# Patient Record
Sex: MALE | Race: WHITE | NOT HISPANIC OR LATINO | URBAN - METROPOLITAN AREA
[De-identification: names, ages, dates, MRNs, and addresses within clinical notes are randomized per-mention and may not be internally consistent; named-entity substitution may affect disease eponyms.]

---

## 2017-04-03 ENCOUNTER — OUTPATIENT (OUTPATIENT)
Dept: OUTPATIENT SERVICES | Facility: HOSPITAL | Age: 67
LOS: 1 days | End: 2017-04-03

## 2017-05-25 ENCOUNTER — APPOINTMENT (OUTPATIENT)
Dept: SURGERY | Facility: CLINIC | Age: 67
End: 2017-05-25

## 2017-05-25 VITALS — HEIGHT: 73 IN | WEIGHT: 236 LBS | BODY MASS INDEX: 31.28 KG/M2

## 2017-06-27 DIAGNOSIS — R13.14 DYSPHAGIA, PHARYNGOESOPHAGEAL PHASE: ICD-10-CM

## 2017-06-28 ENCOUNTER — OUTPATIENT (OUTPATIENT)
Dept: OUTPATIENT SERVICES | Facility: HOSPITAL | Age: 67
LOS: 1 days | Discharge: HOME | End: 2017-06-28

## 2017-06-28 ENCOUNTER — APPOINTMENT (OUTPATIENT)
Dept: SURGERY | Facility: AMBULATORY SURGERY CENTER | Age: 67
End: 2017-06-28

## 2017-06-30 DIAGNOSIS — E78.00 PURE HYPERCHOLESTEROLEMIA, UNSPECIFIED: ICD-10-CM

## 2017-06-30 DIAGNOSIS — E11.9 TYPE 2 DIABETES MELLITUS WITHOUT COMPLICATIONS: ICD-10-CM

## 2017-06-30 DIAGNOSIS — K42.9 UMBILICAL HERNIA WITHOUT OBSTRUCTION OR GANGRENE: ICD-10-CM

## 2017-06-30 DIAGNOSIS — I10 ESSENTIAL (PRIMARY) HYPERTENSION: ICD-10-CM

## 2017-06-30 DIAGNOSIS — K43.6 OTHER AND UNSPECIFIED VENTRAL HERNIA WITH OBSTRUCTION, WITHOUT GANGRENE: ICD-10-CM

## 2017-06-30 DIAGNOSIS — K40.90 UNILATERAL INGUINAL HERNIA, WITHOUT OBSTRUCTION OR GANGRENE, NOT SPECIFIED AS RECURRENT: ICD-10-CM

## 2017-07-06 ENCOUNTER — APPOINTMENT (OUTPATIENT)
Dept: SURGERY | Facility: CLINIC | Age: 67
End: 2017-07-06

## 2017-08-10 ENCOUNTER — APPOINTMENT (OUTPATIENT)
Dept: SURGERY | Facility: CLINIC | Age: 67
End: 2017-08-10
Payer: OTHER MISCELLANEOUS

## 2017-08-10 DIAGNOSIS — K42.9 UMBILICAL HERNIA W/OUT OBSTRUCTION OR GANGRENE: ICD-10-CM

## 2017-08-10 DIAGNOSIS — K43.6 OTHER AND UNSPECIFIED VENTRAL HERNIA WITH OBSTRUCTION, W/OUT GANGRENE: ICD-10-CM

## 2017-08-10 DIAGNOSIS — K40.90 UNILATERAL INGUINAL HERNIA, W/OUT OBSTRUCTION OR GANGRENE, NOT SPECIFIED AS RECURRENT: ICD-10-CM

## 2017-08-10 PROCEDURE — 99024 POSTOP FOLLOW-UP VISIT: CPT

## 2017-11-02 ENCOUNTER — OUTPATIENT (OUTPATIENT)
Dept: OUTPATIENT SERVICES | Facility: HOSPITAL | Age: 67
LOS: 1 days | Discharge: HOME | End: 2017-11-02

## 2017-11-02 DIAGNOSIS — Z79.899 OTHER LONG TERM (CURRENT) DRUG THERAPY: ICD-10-CM

## 2017-11-02 DIAGNOSIS — E78.00 PURE HYPERCHOLESTEROLEMIA, UNSPECIFIED: ICD-10-CM

## 2019-06-28 ENCOUNTER — RECORD ABSTRACTING (OUTPATIENT)
Age: 69
End: 2019-06-28

## 2019-06-28 DIAGNOSIS — Z87.891 PERSONAL HISTORY OF NICOTINE DEPENDENCE: ICD-10-CM

## 2019-06-28 DIAGNOSIS — Z85.810 PERSONAL HISTORY OF MALIGNANT NEOPLASM OF TONGUE: ICD-10-CM

## 2019-06-28 DIAGNOSIS — Z82.49 FAMILY HISTORY OF ISCHEMIC HEART DISEASE AND OTHER DISEASES OF THE CIRCULATORY SYSTEM: ICD-10-CM

## 2019-06-28 DIAGNOSIS — Z86.69 PERSONAL HISTORY OF OTHER DISEASES OF THE NERVOUS SYSTEM AND SENSE ORGANS: ICD-10-CM

## 2019-06-28 DIAGNOSIS — Z86.39 PERSONAL HISTORY OF OTHER ENDOCRINE, NUTRITIONAL AND METABOLIC DISEASE: ICD-10-CM

## 2019-06-28 DIAGNOSIS — Z86.79 PERSONAL HISTORY OF OTHER DISEASES OF THE CIRCULATORY SYSTEM: ICD-10-CM

## 2019-06-28 DIAGNOSIS — Z78.9 OTHER SPECIFIED HEALTH STATUS: ICD-10-CM

## 2019-06-28 RX ORDER — CHROMIUM 200 MCG
1000 TABLET ORAL
Refills: 0 | Status: ACTIVE | COMMUNITY

## 2019-06-28 RX ORDER — ZOLPIDEM TARTRATE 5 MG/1
5 TABLET, FILM COATED ORAL
Refills: 0 | Status: ACTIVE | COMMUNITY

## 2019-06-28 RX ORDER — GABAPENTIN 300 MG/1
300 CAPSULE ORAL
Refills: 0 | Status: ACTIVE | COMMUNITY

## 2019-08-31 ENCOUNTER — APPOINTMENT (OUTPATIENT)
Dept: ENDOCRINOLOGY | Facility: CLINIC | Age: 69
End: 2019-08-31

## 2019-11-06 ENCOUNTER — APPOINTMENT (OUTPATIENT)
Dept: CARDIOLOGY | Facility: CLINIC | Age: 69
End: 2019-11-06

## 2019-11-29 ENCOUNTER — APPOINTMENT (OUTPATIENT)
Dept: CARDIOLOGY | Facility: CLINIC | Age: 69
End: 2019-11-29

## 2020-01-25 ENCOUNTER — APPOINTMENT (OUTPATIENT)
Dept: ENDOCRINOLOGY | Facility: CLINIC | Age: 70
End: 2020-01-25

## 2021-07-04 ENCOUNTER — RX RENEWAL (OUTPATIENT)
Age: 71
End: 2021-07-04

## 2021-07-19 ENCOUNTER — RX RENEWAL (OUTPATIENT)
Age: 71
End: 2021-07-19

## 2021-07-24 ENCOUNTER — APPOINTMENT (OUTPATIENT)
Dept: ENDOCRINOLOGY | Facility: CLINIC | Age: 71
End: 2021-07-24
Payer: COMMERCIAL

## 2021-07-24 VITALS
HEIGHT: 73 IN | TEMPERATURE: 97.3 F | WEIGHT: 222 LBS | HEART RATE: 68 BPM | SYSTOLIC BLOOD PRESSURE: 130 MMHG | OXYGEN SATURATION: 98 % | DIASTOLIC BLOOD PRESSURE: 70 MMHG | BODY MASS INDEX: 29.42 KG/M2

## 2021-07-24 PROCEDURE — 99072 ADDL SUPL MATRL&STAF TM PHE: CPT

## 2021-07-24 PROCEDURE — 99212 OFFICE O/P EST SF 10 MIN: CPT

## 2021-07-24 NOTE — ASSESSMENT
[FreeTextEntry1] : I had a long discussion with patient regarding diabetes control including home readings. Goal HbA1c (< 7.0) was discussed and counseling provided regarding diet/exercise and complications of diabetes (renal and cardiac and neurologic as well as  and need for eye exams and examination of feet. Lipids and importance of BP control also reviewed. HbA1c currently at 7.6 and microalbumin 13.8 mcg/mg creatinine.\par Pt. has history of hypertension. Risks related to hypertension including cardiac, renal and vascular fully discussed. Diet discussed as well. Medications and importance of compliance reviewed.\par Lipid levels were reviewed with patient and importance and function of LDL, HDL and triglycerides discussed. Methods to increase HDL (exercise, fish, beans, oat meal, legumes etc.) discussed with pt. in conjunction with measures to decrease LDL and triglycerides  including diet and exercise.LDL/HDL was up from 110/47 => 144/44. Triglycerides were 129. .Current regimen of treatment to continue. Risks/benefits  of statins were discussed in detail. Compliance with Zocor 5 mg discussed. \par \par

## 2021-07-29 ENCOUNTER — INPATIENT (INPATIENT)
Facility: HOSPITAL | Age: 71
LOS: 1 days | Discharge: HOME | End: 2021-07-31
Attending: INTERNAL MEDICINE | Admitting: INTERNAL MEDICINE
Payer: MEDICARE

## 2021-07-29 VITALS
WEIGHT: 220.02 LBS | DIASTOLIC BLOOD PRESSURE: 74 MMHG | OXYGEN SATURATION: 98 % | HEART RATE: 62 BPM | TEMPERATURE: 96 F | RESPIRATION RATE: 18 BRPM | SYSTOLIC BLOOD PRESSURE: 141 MMHG

## 2021-07-29 LAB
ALBUMIN SERPL ELPH-MCNC: 4.8 G/DL — SIGNIFICANT CHANGE UP (ref 3.5–5.2)
ALP SERPL-CCNC: 59 U/L — SIGNIFICANT CHANGE UP (ref 30–115)
ALT FLD-CCNC: 61 U/L — HIGH (ref 0–41)
ANION GAP SERPL CALC-SCNC: 13 MMOL/L — SIGNIFICANT CHANGE UP (ref 7–14)
APTT BLD: 33.6 SEC — SIGNIFICANT CHANGE UP (ref 27–39.2)
AST SERPL-CCNC: 78 U/L — HIGH (ref 0–41)
BASOPHILS # BLD AUTO: 0.04 K/UL — SIGNIFICANT CHANGE UP (ref 0–0.2)
BASOPHILS NFR BLD AUTO: 0.6 % — SIGNIFICANT CHANGE UP (ref 0–1)
BILIRUB SERPL-MCNC: 0.8 MG/DL — SIGNIFICANT CHANGE UP (ref 0.2–1.2)
BUN SERPL-MCNC: 15 MG/DL — SIGNIFICANT CHANGE UP (ref 10–20)
CALCIUM SERPL-MCNC: 9.8 MG/DL — SIGNIFICANT CHANGE UP (ref 8.5–10.1)
CHLORIDE SERPL-SCNC: 99 MMOL/L — SIGNIFICANT CHANGE UP (ref 98–110)
CO2 SERPL-SCNC: 23 MMOL/L — SIGNIFICANT CHANGE UP (ref 17–32)
CREAT SERPL-MCNC: 0.9 MG/DL — SIGNIFICANT CHANGE UP (ref 0.7–1.5)
EOSINOPHIL # BLD AUTO: 0.07 K/UL — SIGNIFICANT CHANGE UP (ref 0–0.7)
EOSINOPHIL NFR BLD AUTO: 1.1 % — SIGNIFICANT CHANGE UP (ref 0–8)
ERYTHROCYTE [SEDIMENTATION RATE] IN BLOOD: 3 MM/HR — SIGNIFICANT CHANGE UP (ref 0–10)
GLUCOSE BLDC GLUCOMTR-MCNC: 86 MG/DL — SIGNIFICANT CHANGE UP (ref 70–99)
GLUCOSE SERPL-MCNC: 154 MG/DL — HIGH (ref 70–99)
HCT VFR BLD CALC: 46.3 % — SIGNIFICANT CHANGE UP (ref 42–52)
HGB BLD-MCNC: 15.9 G/DL — SIGNIFICANT CHANGE UP (ref 14–18)
IMM GRANULOCYTES NFR BLD AUTO: 1.1 % — HIGH (ref 0.1–0.3)
INR BLD: 1.02 RATIO — SIGNIFICANT CHANGE UP (ref 0.65–1.3)
LACTATE SERPL-SCNC: 1.4 MMOL/L — SIGNIFICANT CHANGE UP (ref 0.7–2)
LYMPHOCYTES # BLD AUTO: 0.96 K/UL — LOW (ref 1.2–3.4)
LYMPHOCYTES # BLD AUTO: 14.5 % — LOW (ref 20.5–51.1)
MCHC RBC-ENTMCNC: 30 PG — SIGNIFICANT CHANGE UP (ref 27–31)
MCHC RBC-ENTMCNC: 34.3 G/DL — SIGNIFICANT CHANGE UP (ref 32–37)
MCV RBC AUTO: 87.4 FL — SIGNIFICANT CHANGE UP (ref 80–94)
MONOCYTES # BLD AUTO: 0.63 K/UL — HIGH (ref 0.1–0.6)
MONOCYTES NFR BLD AUTO: 9.5 % — HIGH (ref 1.7–9.3)
NEUTROPHILS # BLD AUTO: 4.83 K/UL — SIGNIFICANT CHANGE UP (ref 1.4–6.5)
NEUTROPHILS NFR BLD AUTO: 73.2 % — SIGNIFICANT CHANGE UP (ref 42.2–75.2)
NRBC # BLD: 0 /100 WBCS — SIGNIFICANT CHANGE UP (ref 0–0)
PLATELET # BLD AUTO: 262 K/UL — SIGNIFICANT CHANGE UP (ref 130–400)
POTASSIUM SERPL-MCNC: 4.3 MMOL/L — SIGNIFICANT CHANGE UP (ref 3.5–5)
POTASSIUM SERPL-SCNC: 4.3 MMOL/L — SIGNIFICANT CHANGE UP (ref 3.5–5)
PROT SERPL-MCNC: 7 G/DL — SIGNIFICANT CHANGE UP (ref 6–8)
PROTHROM AB SERPL-ACNC: 11.7 SEC — SIGNIFICANT CHANGE UP (ref 9.95–12.87)
RBC # BLD: 5.3 M/UL — SIGNIFICANT CHANGE UP (ref 4.7–6.1)
RBC # FLD: 12.7 % — SIGNIFICANT CHANGE UP (ref 11.5–14.5)
SARS-COV-2 RNA SPEC QL NAA+PROBE: DETECTED
SODIUM SERPL-SCNC: 135 MMOL/L — SIGNIFICANT CHANGE UP (ref 135–146)
WBC # BLD: 6.6 K/UL — SIGNIFICANT CHANGE UP (ref 4.8–10.8)
WBC # FLD AUTO: 6.6 K/UL — SIGNIFICANT CHANGE UP (ref 4.8–10.8)

## 2021-07-29 PROCEDURE — 71045 X-RAY EXAM CHEST 1 VIEW: CPT | Mod: 26

## 2021-07-29 PROCEDURE — 93010 ELECTROCARDIOGRAM REPORT: CPT

## 2021-07-29 PROCEDURE — 73630 X-RAY EXAM OF FOOT: CPT | Mod: 26,LT

## 2021-07-29 PROCEDURE — 99285 EMERGENCY DEPT VISIT HI MDM: CPT | Mod: GC

## 2021-07-29 PROCEDURE — 93925 LOWER EXTREMITY STUDY: CPT | Mod: 26

## 2021-07-29 RX ORDER — CHLORHEXIDINE GLUCONATE 213 G/1000ML
1 SOLUTION TOPICAL
Refills: 0 | Status: DISCONTINUED | OUTPATIENT
Start: 2021-07-29 | End: 2021-07-30

## 2021-07-29 RX ORDER — LISINOPRIL 2.5 MG/1
1 TABLET ORAL
Qty: 0 | Refills: 0 | DISCHARGE

## 2021-07-29 RX ORDER — SODIUM CHLORIDE 9 MG/ML
1000 INJECTION, SOLUTION INTRAVENOUS
Refills: 0 | Status: DISCONTINUED | OUTPATIENT
Start: 2021-07-30 | End: 2021-07-30

## 2021-07-29 RX ORDER — ATENOLOL 25 MG/1
1 TABLET ORAL
Qty: 0 | Refills: 0 | DISCHARGE

## 2021-07-29 RX ORDER — SODIUM CHLORIDE 9 MG/ML
2000 INJECTION, SOLUTION INTRAVENOUS ONCE
Refills: 0 | Status: COMPLETED | OUTPATIENT
Start: 2021-07-29 | End: 2021-07-29

## 2021-07-29 RX ORDER — INSULIN LISPRO 100/ML
VIAL (ML) SUBCUTANEOUS
Refills: 0 | Status: DISCONTINUED | OUTPATIENT
Start: 2021-07-29 | End: 2021-07-30

## 2021-07-29 RX ORDER — SIMVASTATIN 20 MG/1
5 TABLET, FILM COATED ORAL AT BEDTIME
Refills: 0 | Status: DISCONTINUED | OUTPATIENT
Start: 2021-07-29 | End: 2021-07-30

## 2021-07-29 RX ORDER — ENOXAPARIN SODIUM 100 MG/ML
40 INJECTION SUBCUTANEOUS AT BEDTIME
Refills: 0 | Status: DISCONTINUED | OUTPATIENT
Start: 2021-07-29 | End: 2021-07-30

## 2021-07-29 RX ORDER — ATENOLOL 25 MG/1
50 TABLET ORAL DAILY
Refills: 0 | Status: DISCONTINUED | OUTPATIENT
Start: 2021-07-29 | End: 2021-07-30

## 2021-07-29 RX ORDER — CEFAZOLIN SODIUM 1 G
1000 VIAL (EA) INJECTION EVERY 8 HOURS
Refills: 0 | Status: DISCONTINUED | OUTPATIENT
Start: 2021-07-29 | End: 2021-07-30

## 2021-07-29 RX ORDER — CHOLECALCIFEROL (VITAMIN D3) 125 MCG
1 CAPSULE ORAL
Qty: 0 | Refills: 0 | DISCHARGE

## 2021-07-29 RX ORDER — METFORMIN HYDROCHLORIDE 850 MG/1
1 TABLET ORAL
Qty: 0 | Refills: 0 | DISCHARGE

## 2021-07-29 RX ORDER — VANCOMYCIN HCL 1 G
2000 VIAL (EA) INTRAVENOUS ONCE
Refills: 0 | Status: COMPLETED | OUTPATIENT
Start: 2021-07-29 | End: 2021-07-29

## 2021-07-29 RX ORDER — CHOLECALCIFEROL (VITAMIN D3) 125 MCG
1000 CAPSULE ORAL DAILY
Refills: 0 | Status: DISCONTINUED | OUTPATIENT
Start: 2021-07-29 | End: 2021-07-30

## 2021-07-29 RX ORDER — SIMVASTATIN 20 MG/1
1 TABLET, FILM COATED ORAL
Qty: 0 | Refills: 0 | DISCHARGE

## 2021-07-29 RX ORDER — LISINOPRIL 2.5 MG/1
10 TABLET ORAL DAILY
Refills: 0 | Status: DISCONTINUED | OUTPATIENT
Start: 2021-07-29 | End: 2021-07-30

## 2021-07-29 RX ADMIN — Medication 250 MILLIGRAM(S): at 14:10

## 2021-07-29 RX ADMIN — SIMVASTATIN 5 MILLIGRAM(S): 20 TABLET, FILM COATED ORAL at 22:51

## 2021-07-29 RX ADMIN — Medication 100 MILLIGRAM(S): at 22:52

## 2021-07-29 RX ADMIN — SODIUM CHLORIDE 2000 MILLILITER(S): 9 INJECTION, SOLUTION INTRAVENOUS at 12:45

## 2021-07-29 NOTE — H&P ADULT - HISTORY OF PRESENT ILLNESS
70 yo with PMHx of HTN, DLD, DMII, gastritis, L thyroid nodule, throat Ca s/p chemotherapy and RT (2016) in remission sent in to ED by Podiatry. Pt has had a poorly healing toe wound after power washing his backyard and hitting his toe about three weeks ago. Reports intermittent pain of his wound. Denies purulent drainage, malodor, fevers, chills, malaise, chest pain, cough.   In the ED, T 96.5, /74, HR 62, RR 18, SpO2 98% on RA. Lab work unremarkable. L foot XRay revealed no findings of osteomyelitis. Also found to be COVID positive. 70 yo with PMHx of HTN, DLD, DMII, gastritis, L thyroid nodule, throat Ca s/p chemotherapy and RT (2016) in remission sent in to ED by Podiatry. Pt has had a poorly healing toe wound after power washing his backyard and hitting his toe about four weeks ago. Reports that since then, his wound has not been healing, frequently painful, especially when he wears sneakers. Denies purulent drainage, malodor, fevers, chills, malaise, chest pain, cough.   In the ED, T 96.5, /74, HR 62, RR 18, SpO2 98% on RA. Lab work unremarkable. L foot XRay revealed no findings of osteomyelitis. Also found to be COVID positive. States that his wife got COVID about three weeks ago, after which he tested positive but only had mild headache and back pain for 1 day. Currently asymptomatic.

## 2021-07-29 NOTE — H&P ADULT - ASSESSMENT
72 yo with PMHx of HTN, DLD, DMII, gastritis, L thyroid nodule, throat Ca s/p chemotherapy and RT (2016) in remission sent in to ED by Podiatry.    #Cellulitis, possible osteomyelitis in setting of poorly controlled DM  - No sepsis, leukocytosis, fevers  - R foot xray revealed no findings of osteomyelitis  - Wound appears non purulent  - Podiatry following - add on for tomorrow for 4th digit amputation  - Pending MRI  - Pending LE Arterial Duplex   - AM ESR, CRP  - ID consult  - s/p Vancomycin in ED, will initiate Ancef for now    #COVID positive  - States that he first tested positive 3-4 weeks ago when exposed to wife  - Against getting vaccinated  - Asymptomatic, continue to monitor    #DMII  - Will obtain A1c, states that A1c was 7 in outpt office  - Will start SS, escalate as needed    #HTN  - C/w Atenolol, Lisinopril    #DLD  - Lipid profile  - C/w Simvastatin    DVT ppx: Lovenox  GI ppx: Not indicated  Diet: DASH, NPO after MN  Activity: IAT  Full Code  Dispo: Acute

## 2021-07-29 NOTE — ED PROVIDER NOTE - PROGRESS NOTE DETAILS
TD: Consulted podiatry, spoke to resident Fely who agrees to see and evaluate pt in ED. Arterial duplex of LE negative for blockage. Will TD: Pt evaluated by podiatry who state pt can be admitted to medicine and request MRI of foot for further evaluation for OM. Podiatry states pt will need medical clearance for OR as depending on MRI results, pt will be an add-on as soon as tomorrow. Pt informed of results, and is agreeable with plan. Pt endorsed to PENNY Stone.

## 2021-07-29 NOTE — ED ADULT TRIAGE NOTE - CHIEF COMPLAINT QUOTE
left 4th toe wound, patient being sent in by Dr. Lainez for further eval r/t "osteomyelitis", patient has history of diabetes

## 2021-07-29 NOTE — H&P ADULT - NSHPSOCIALHISTORY_GEN_ALL_CORE
Lives at home with wife, recently retired.   Denies currently smoking, quit over 25 yrs ago. Denies alcohol or illicit drug use.

## 2021-07-29 NOTE — ED PROVIDER NOTE - ATTENDING CONTRIBUTION TO CARE
67M with PMH HTN, HLD, T2DM who presents for admission for OM per podiatry. He reports using a  while wearing sandals a few weeks ago that broke the skin of his left 4th toe, didn't think anything of it at the time. Sometimes later he aggravated it while wearing sneakers and noticed a blister to his L 4th toe and popped it. He reports worsening pain, denies drainage, fevers, chills, recent abx. He was told he would be admitted for IV abx.     Gen - NAD, Head - NCAT, Pharynx - clear, MMM, Heart - RRR, no m/g/r, Lungs - CTAB, no w/c/r, Abdomen - soft, NT, ND, Skin - No rash, Extremities - + ulcer to the sq tissue of left 4rth dorsal toe, no purulent drainage; mild surrounding erythema. FROM, no edema, ecchymosis, brisk cap refill, Neuro - A&O x3, equal strength and sensation, non-focal exam    a/p: per podiatry recs will obtain labs, BCx, wound cx, will give broad spectrum abx, duplex arterial US and vascular consult and admit

## 2021-07-29 NOTE — CONSULT NOTE ADULT - SUBJECTIVE AND OBJECTIVE BOX
Vascular Cardiology Consult Note      CC:  left 4th toe wound    HPI Objective Statement: 70 yo M with pmhx of T2DM, HLD, HTN, gastitis, L thyroid nodule/MNG, throat cancer s/p chemotherapy and RT (2016) in remission, former smoker, sent to ED by Dr. Lainez for L 4th toe osteomyelitis for admission to podiatry service. Patient has had poor healing toe wound from [facility] for past three weeks with intermittent pain and n/t, denies pus or change in odor. Patient started feeling occasional numbness in forefoot while at rest for about 3 years.       Allergies    No Known Allergies    Intolerances    	  PAST MEDICAL & SURGICAL HISTORY:  HTN (hypertension)    DM2 (diabetes mellitus, type 2)    Thyroid cancer    No significant past surgical history      FAMILY HISTORY:  No pertinent family history in first degree relatives    SOCIAL HISTORY:  unchanged    REVIEW OF SYSTEMS:  CONSTITUTIONAL: No fever, weight loss, or fatigue  EYES: No eye pain, visual disturbances, or discharge  ENMT:  No difficulty hearing, tinnitus, vertigo; No sinus or throat pain  NECK: No pain or stiffness  RESPIRATORY: Clear b/l   CARDIOVASCULAR: S1, S2, SR  GASTROINTESTINAL: No abdominal or epigastric pain. No nausea, vomiting, or hematemesis; No diarrhea or constipation. No melena or hematochezia.  GENITOURINARY: No dysuria, frequency, hematuria, or incontinence  NEUROLOGICAL: No headaches, memory loss, loss of strength, numbness, or tremors  SKIN: skin breakdown on 4th toe - left leg  LYMPH Nodes: No enlarged glands  ENDOCRINE: No heat or cold intolerance; No hair loss  MUSCULOSKELETAL: No joint pain or swelling; No muscle, back, or extremity pain  PSYCHIATRIC: No depression, anxiety, mood swings, or difficulty sleeping  HEME/LYMPH: No easy bruising, or bleeding gums  ALLERY AND IMMUNOLOGIC: No hives or eczema	    [ x] All others negative	  [ ] Unable to obtain    PHYSICAL EXAM:  T(C): 35.8 (07-29-21 @ 11:29), Max: 35.8 (07-29-21 @ 11:29)  HR: 62 (07-29-21 @ 11:29) (62 - 62)  BP: 141/74 (07-29-21 @ 11:29) (141/74 - 141/74)  RR: 18 (07-29-21 @ 11:29) (18 - 18)  SpO2: 98% (07-29-21 @ 11:29) (98% - 98%)  Wt(kg): --  I&O's Summary      Appearance:  	  HEENT:   Normal oral mucosa, PERRL, EOMI	  Lymphatic: No lymphadenopathy  Cardiovascular: S1, S2, RRR  Respiratory: CTA b/l	  Psychiatry:  AAO x 3  Gastrointestinal:  Soft, Non-tender, + BS	  Skin: No rashes, No ecchymoses, No cyanosis	  Neurologic: No headaches, memory loss, loss of strength, numbness, or tremors  Extremities: + left 4th toe TTP, edema, no purulent drainage, sensation intact. minimal overlying erythema.    Vascular Pulse Exam:  Right DP: [x]palpable []non-palpable []audible      Left DP :  [x]palpable []non-palpable []audible  Right PT: [x]palpable [] non-palpable []audible      Left PT:  [x] palpable [] non-palpable []audible         Foot Exam:  left 4th toe small wound      LABS:	 	    CBC Full  -  ( 29 Jul 2021 12:39 )  WBC Count : 6.60 K/uL  Hemoglobin : 15.9 g/dL  Hematocrit : 46.3 %  Platelet Count - Automated : 262 K/uL  Mean Cell Volume : 87.4 fL  Mean Cell Hemoglobin : 30.0 pg  Mean Cell Hemoglobin Concentration : 34.3 g/dL  Auto Neutrophil # : 4.83 K/uL  Auto Lymphocyte # : 0.96 K/uL  Auto Monocyte # : 0.63 K/uL  Auto Eosinophil # : 0.07 K/uL  Auto Basophil # : 0.04 K/uL  Auto Neutrophil % : 73.2 %  Auto Lymphocyte % : 14.5 %  Auto Monocyte % : 9.5 %  Auto Eosinophil % : 1.1 %  Auto Basophil % : 0.6 %    07-29    135  |  99  |  15  ----------------------------<  154<H>  4.3   |  23  |  0.9    Ca    9.8      29 Jul 2021 12:39    TPro  7.0  /  Alb  4.8  /  TBili  0.8  /  DBili  x   /  AST  78<H>  /  ALT  61<H>  /  AlkPhos  59  07-29    < from: Xray Chest 1 View- PORTABLE-Urgent (07.29.21 @ 12:53) >  EXAM:  XR CHEST PORTABLE URGENT 1V          *** ADDENDUM 07/29/2021  ***    A questionable asymmetric density left apex is noted. Follow-up PA and lateral views suggested.    PROCEDURE DATE:  07/29/2021      INTERPRETATION:  Clinical History/Reason for Exam:  Sepsis    Comparison: None.    CORRELATION:  CT PT HEAD 3/30/2016.    Findings:    Technique/Positioning:  @Technique@.    Support devices:  none    Cardiac/mediastinum/hilum: Unremarkable    Lung parenchyma/ Pleura: No focal parenchymal opacities, effusion or pneumothorax is present.      Skeleton/soft tissues: Degenerative changes thoracic spine    Impression:    No consolidation, effusion or pneumothorax.          Assessment:  70 yo M with pmhx of T2DM, HLD, HTN, gastitis, L thyroid nodule/MNG, throat cancer s/p chemotherapy and RT (2016) in remission, former smoker, sent to ED by Dr. Lainez for L 4th toe osteomyelitis for admission to podiatry service. Patient has had poor healing toe wound from [facility] for past three weeks with intermittent pain and n/t, denies pus or change in odor. Vascular Cardiology consulted for vascular evaluation of left leg.       1. left 4th toe wound       Plan:  1. obtain GREG and lower extremity duplex       Vascular Cardiology Service

## 2021-07-29 NOTE — H&P ADULT - NSHPPHYSICALEXAM_GEN_ALL_CORE
VITALS:   T(C): 36.1 (07-29-21 @ 16:13), Max: 36.1 (07-29-21 @ 16:13)  HR: 50 (07-29-21 @ 16:13) (50 - 62)  BP: 134/77 (07-29-21 @ 16:13) (134/77 - 141/74)  RR: 18 (07-29-21 @ 16:13) (18 - 18)  SpO2: 98% (07-29-21 @ 16:13) (98% - 98%)    GENERAL: NAD, lying in bed comfortably  HEAD:  Atraumatic, normocephalic  EYES: EOMI, PERRLA, conjunctiva and sclera clear  ENT: Moist mucous membranes  NECK: Supple, no JVD  HEART: Regular rate and rhythm, no murmurs, rubs, or gallops  LUNGS: Unlabored respirations.  Clear to auscultation bilaterally, no crackles, wheezing, or rhonchi  ABDOMEN: Soft, nontender, nondistended, +BS  EXTREMITIES: 2+ peripheral pulses bilaterally. No clubbing, cyanosis, or edema. L fourth toe erythematous, small closed, dry appearing, healing wound, no purulent drainage  NERVOUS SYSTEM:  A&Ox3, no focal deficits   SKIN: No rashes or lesions

## 2021-07-29 NOTE — CHART NOTE - NSCHARTNOTEFT_GEN_A_CORE
- Sx as add-on #1 Fri 7/30; 4th Digit amp, Left foot  - Request Medical Clearance  - Presurgical labs optimization  - NPO MN Thurs 7/29  - MRI-L prior to OR to r/o OM

## 2021-07-29 NOTE — H&P ADULT - ATTENDING COMMENTS
Patient is a 70 y/o male with PMHx of HTN, DLD, DMII, gastritis, L thyroid nodule, throat Ca s/p chemotherapy and RT (2016) in remission sent in to ED by Podiatry for evaluation of left 4th toe dry wound.    # Left 4th toe wound- dry , no drainage  - s/p MRI of foot - joint capsulitis  - as per ID IV Cefazolin   -ESR-3  -low  extremities art. doppler- normal flow.  - f/up Podiatry for further rec. , patient is medically stable for local debridement of wound.     # Covid positive - sheading virus   - no active clinical symptoms  -observation    # h/o HTN, Dyslipidemia, Gastritis, Thyroid nodule- stable, resumed on home meds. tx.    DVT proph.    #Progress Note Handoff: IV abx, f/up Podiatry for rec.   Family discussion: medical plan of tx. d/w pt. Disposition: Home_ once medically stable    Total time spent to complete patient's bedside assessment, review medical chart, discuss medical plan of care with covering medical team was more than 35 minutes Patient is a 70 y/o male with PMHx of HTN, DLD, DMII, gastritis, L thyroid nodule, throat Ca s/p chemotherapy and RT (2016) in remission sent in to ED by Podiatry for evaluation of left 4th toe dry wound.    # Left 4th toe wound- dry , no drainage  - s/p MRI of foot - joint capsulitis  - as per ID IV Cefazolin   -ESR-3  -low  extremities art. doppler- normal flow.  - f/up Podiatry for further rec. , patient is medically stable for local debridement of wound.     # Covid positive - sheading virus   - no active clinical symptoms  -observation    # h/o HTN, Dyslipidemia, Gastritis, Thyroid nodule- stable, resumed on home meds. tx.    DVT proph.    #Progress Note Handoff: IV abx, f/up Podiatry for rec.   Family discussion: medical plan of tx. d/w pt. Disposition: Home_ once medically stable    Total time spent to complete patient's bedside assessment, review medical chart, review radiology study results, discuss medical plan of care with covering medical team/patient  was more than 70 minutes

## 2021-07-29 NOTE — ED ADULT NURSE NOTE - OBJECTIVE STATEMENT
pt sent in by PMD for left 4th toe infection x 3 weeks while power washing. Pt sent in to r/o osteomyelitis. Pt denies pain, denies drainage.

## 2021-07-29 NOTE — ED PROVIDER NOTE - NS ED ROS FT
Constitutional:  See HPI  Eyes:  No visual changes  ENMT: No neck pain or stiffness  Cardiac:  No chest pain  Respiratory:  No cough or respiratory distress.   GI:  No nausea, vomiting, diarrhea or abdominal pain.  :  No dysuria, frequency or burning.  MS:  + left toe pain. No back pain.  Neuro:  No headache   Skin:  No skin rash  Except as documented in the HPI,  all other systems are negative

## 2021-07-29 NOTE — H&P ADULT - NSHPLABSRESULTS_GEN_ALL_CORE
LABS:                          15.9   6.60  )-----------( 262      ( 29 Jul 2021 12:39 )             46.3     07-29    135  |  99  |  15  ----------------------------<  154<H>  4.3   |  23  |  0.9    Ca    9.8      29 Jul 2021 12:39    TPro  7.0  /  Alb  4.8  /  TBili  0.8  /  DBili  x   /  AST  78<H>  /  ALT  61<H>  /  AlkPhos  59  07-29    PT/INR - ( 29 Jul 2021 12:39 )   PT: 11.70 sec;   INR: 1.02 ratio    PTT - ( 29 Jul 2021 12:39 )  PTT:33.6 sec    12 Lead ECG (07.29.21 @ 14:16)    Diagnosis Line Sinus bradycardia  Otherwise normal ECG    Xray Foot AP + Lateral + Oblique, Left (07.29.21 @ 12:53)     Impression:    Noacute fracture or malalignment.   No current radiographic evidence of osteomyelitis.

## 2021-07-29 NOTE — CONSULT NOTE ADULT - SUBJECTIVE AND OBJECTIVE BOX
Podiatry Consult Note    Subjective:  VERN CHAVARRIA is a pleasant well-nourished, well developed 71y Male in no acute distress, alert awake, and oriented to person, place and time. Pt reports sustaining a wound to his Left 4th toe a few weeks ago. He has used betadine and bacitracin but seen no improvement. Pt saw his podiatrist Dr Lainez yesterday 7/28 in clinic and was sent to ED for further evaluation and possible surgical intervention. Pt denies any constitutional symptoms.    HPI:      Past Medical History and Surgical History  PAST MEDICAL & SURGICAL HISTORY:  HTN (hypertension)    DM2 (diabetes mellitus, type 2)    Thyroid cancer    No significant past surgical history         Review of Systems:   Constitutional: No weakness, fevers or chills  Eyes / ENT: No visual changes; No vertigo or throat pain   Neck: No pain or stiffness  Respiratory: No cough, wheezing, hemoptysis; No shortness of breath  Cardiovascular: No chest pain or palpitations  Gastrointestinal: No abdominal or epigastric pain. No nausea, vomiting, or hematemesis; No diarrhea or constipation. No melena or hematochezia.  Genitourinary: No dysuria, frequency or hematuria  Neurological: No numbness or weakness  Skin:    Objective:  Vital Signs Last 24 Hrs  T(C): 35.8 (29 Jul 2021 11:29), Max: 35.8 (29 Jul 2021 11:29)  T(F): 96.5 (29 Jul 2021 11:29), Max: 96.5 (29 Jul 2021 11:29)  HR: 62 (29 Jul 2021 11:29) (62 - 62)  BP: 141/74 (29 Jul 2021 11:29) (141/74 - 141/74)  BP(mean): --  RR: 18 (29 Jul 2021 11:29) (18 - 18)  SpO2: 98% (29 Jul 2021 11:29) (98% - 98%)                        15.9   6.60  )-----------( 262      ( 29 Jul 2021 12:39 )             46.3                 07-29    135  |  99  |  15  ----------------------------<  154<H>  4.3   |  23  |  0.9    Ca    9.8      29 Jul 2021 12:39    TPro  7.0  /  Alb  4.8  /  TBili  0.8  /  DBili  x   /  AST  78<H>  /  ALT  61<H>  /  AlkPhos  59  07-29        Physical Exam - Lower Extremity Focused:   Derm:   Wound to Dorsal Left 4th Digit  Probes to bone  No active drainage or bleeding; No surrounding erythema or cellulitis  No other open wounds or lesions to bilateral feet    Vascular: DP and PT Pulses Diminished; Foot is Warm to Warm to the touch   Neuro: Protective Sensation Diminished  MSK: Minimal Pain On Palpation at Wound Site     Assessment:  DFU, Left 4th Digit - Possible OM    Plan:  Chart reviewed and Patient evaluated. All Questions and Concerns Addressed and Answered  Discussed diagnosis and treatment with patient  Wound Packed w/ Betadine Soaked Gauze / DSD / Kerlix   Cont LWC  XR Imaging Right Foot; Pending Results  Recommend; Lower Extremity Arterial Duplex B/L; ESR/CRP;   Request MRI-Left Foot prior to OR  Request ID Consult  Add-On#1 for Friday 7/30; 4th Digit Amp, Left Foot  Request Medical Clearance  NPO at MN Thursday 7/29  WBAT  Discussed Plan w/ Attending    Podiatry        Podiatry Consult Note    Subjective:  VERN CHAVARRIA is a pleasant well-nourished, well developed 71y Male in no acute distress, alert awake, and oriented to person, place and time. Pt reports sustaining a wound to his Left 4th toe a few weeks ago. He has used betadine and bacitracin but seen no improvement. Pt saw his podiatrist Dr Lainez yesterday 7/28 in clinic and was sent to ED for further evaluation and possible surgical intervention. Pt denies any constitutional symptoms.    HPI:      Past Medical History and Surgical History  PAST MEDICAL & SURGICAL HISTORY:  HTN (hypertension)    DM2 (diabetes mellitus, type 2)    Thyroid cancer    No significant past surgical history         Review of Systems:   Constitutional: No weakness, fevers or chills  Eyes / ENT: No visual changes; No vertigo or throat pain   Neck: No pain or stiffness  Respiratory: No cough, wheezing, hemoptysis; No shortness of breath  Cardiovascular: No chest pain or palpitations  Gastrointestinal: No abdominal or epigastric pain. No nausea, vomiting, or hematemesis; No diarrhea or constipation. No melena or hematochezia.  Genitourinary: No dysuria, frequency or hematuria  Neurological: No numbness or weakness  Skin:    Objective:  Vital Signs Last 24 Hrs  T(C): 35.8 (29 Jul 2021 11:29), Max: 35.8 (29 Jul 2021 11:29)  T(F): 96.5 (29 Jul 2021 11:29), Max: 96.5 (29 Jul 2021 11:29)  HR: 62 (29 Jul 2021 11:29) (62 - 62)  BP: 141/74 (29 Jul 2021 11:29) (141/74 - 141/74)  BP(mean): --  RR: 18 (29 Jul 2021 11:29) (18 - 18)  SpO2: 98% (29 Jul 2021 11:29) (98% - 98%)                        15.9   6.60  )-----------( 262      ( 29 Jul 2021 12:39 )             46.3                 07-29    135  |  99  |  15  ----------------------------<  154<H>  4.3   |  23  |  0.9    Ca    9.8      29 Jul 2021 12:39    TPro  7.0  /  Alb  4.8  /  TBili  0.8  /  DBili  x   /  AST  78<H>  /  ALT  61<H>  /  AlkPhos  59  07-29        Physical Exam - Lower Extremity Focused:   Derm:   Wound to Dorsal Left 4th Digit  Probes to bone  No active drainage or bleeding; No surrounding erythema or cellulitis  No other open wounds or lesions to bilateral feet    Vascular: DP and PT Pulses Diminished; Foot is Warm to Warm to the touch   Neuro: Protective Sensation Diminished  MSK: Minimal Pain On Palpation at Wound Site     Assessment:  DFU, Left 4th Digit - Possible OM    Plan:  Chart reviewed and Patient evaluated. All Questions and Concerns Addressed and Answered  Discussed diagnosis and treatment with patient  Wound Packed w/ Betadine Soaked Gauze / DSD / Kerlix   Cont LWC  XR Imaging Right Foot; Pending Results  Recommend; Lower Extremity Arterial Duplex B/L; ESR/CRP;   Request MRI-Left Foot prior to OR  Request ID Consult  Please admit under Medicine  Add-On#1 for Friday 7/30; 4th Digit Amp, Left Foot  Request Medical Clearance  NPO at MN Thursday 7/29  WBAT  Discussed Plan w/ Attending    Podiatry

## 2021-07-29 NOTE — ED PROVIDER NOTE - CLINICAL SUMMARY MEDICAL DECISION MAKING FREE TEXT BOX
67M with PMH HTN, HLD, T2DM who presents for admission for OM per podiatry. ekg, labs, and imaging, incl L foot XR, duplex arterial scan wnl. Vascular at the bedside requested GREG, which was done in the ED. pt received iv abx and will be admitted to podiatry service. Pt updated at the bedside.

## 2021-07-29 NOTE — ED PROVIDER NOTE - OBJECTIVE STATEMENT
70 yo M with pmhx of T2DM, HLD, HTN, gastitis, L thyroid nodule/MNG, throat cancer s/p chemotherapy and RT (2016) in remission, former smoker, sent to ED by Dr. Lainez for L 4th toe osteomyelitis for admission to podiatry service. Patient has had poor healing toe wound from [facility] for past three weeks with intermittent pain and n/t, denies pus or change in odor. Patient's most recent A1C was 7.7, up from 7.2 2 years ago, this morning's FS ~200, recent increase in metformin daily dosage from 1000g to 1500g. 72 yo M with pmhx of HTN, HLD, DM2, gastitis, L thyroid nodule/MNG, throat cancer s/p chemotherapy and RT (2016) in remission, former smoker, sent to ED by Dr. Lainez for L 4th toe osteomyelitis for admission to podiatry service. Patient has had poor healing toe wound from [facility] for past three weeks with intermittent pain and n/t, denies pus or change in odor, denies fevers/chills/malaise, chest pain, cough, or SOB.

## 2021-07-29 NOTE — ED PROVIDER NOTE - PHYSICAL EXAMINATION
CONSTITUTIONAL: NAD  SKIN: Warm dry  HEAD: NCAT  EYES: NL inspection  ENT: MMM  NECK: Supple; non tender.  CARD: RRR  RESP: CTAB  ABD: S/NT no R/G  EXT: + left 4th toe TTP, edema, no purulent drainage, sensation intact. Minimal overlying erythema.  NEURO: Grossly unremarkable  PSYCH: Cooperative, appropriate.

## 2021-07-30 ENCOUNTER — RESULT REVIEW (OUTPATIENT)
Age: 71
End: 2021-07-30

## 2021-07-30 LAB
A1C WITH ESTIMATED AVERAGE GLUCOSE RESULT: 7.1 % — HIGH (ref 4–5.6)
ALBUMIN SERPL ELPH-MCNC: 4.3 G/DL — SIGNIFICANT CHANGE UP (ref 3.5–5.2)
ALP SERPL-CCNC: 54 U/L — SIGNIFICANT CHANGE UP (ref 30–115)
ALT FLD-CCNC: 47 U/L — HIGH (ref 0–41)
ANION GAP SERPL CALC-SCNC: 10 MMOL/L — SIGNIFICANT CHANGE UP (ref 7–14)
AST SERPL-CCNC: 47 U/L — HIGH (ref 0–41)
BASOPHILS # BLD AUTO: 0.06 K/UL — SIGNIFICANT CHANGE UP (ref 0–0.2)
BASOPHILS NFR BLD AUTO: 1 % — SIGNIFICANT CHANGE UP (ref 0–1)
BILIRUB SERPL-MCNC: 0.7 MG/DL — SIGNIFICANT CHANGE UP (ref 0.2–1.2)
BLD GP AB SCN SERPL QL: SIGNIFICANT CHANGE UP
BUN SERPL-MCNC: 11 MG/DL — SIGNIFICANT CHANGE UP (ref 10–20)
CALCIUM SERPL-MCNC: 9.4 MG/DL — SIGNIFICANT CHANGE UP (ref 8.5–10.1)
CHLORIDE SERPL-SCNC: 102 MMOL/L — SIGNIFICANT CHANGE UP (ref 98–110)
CHOLEST SERPL-MCNC: 156 MG/DL — SIGNIFICANT CHANGE UP
CO2 SERPL-SCNC: 27 MMOL/L — SIGNIFICANT CHANGE UP (ref 17–32)
CREAT SERPL-MCNC: 0.8 MG/DL — SIGNIFICANT CHANGE UP (ref 0.7–1.5)
CRP SERPL-MCNC: <3 MG/L — SIGNIFICANT CHANGE UP
EOSINOPHIL # BLD AUTO: 0.12 K/UL — SIGNIFICANT CHANGE UP (ref 0–0.7)
EOSINOPHIL NFR BLD AUTO: 2 % — SIGNIFICANT CHANGE UP (ref 0–8)
ESTIMATED AVERAGE GLUCOSE: 157 MG/DL — HIGH (ref 68–114)
GLUCOSE BLDC GLUCOMTR-MCNC: 104 MG/DL — HIGH (ref 70–99)
GLUCOSE BLDC GLUCOMTR-MCNC: 154 MG/DL — HIGH (ref 70–99)
GLUCOSE BLDC GLUCOMTR-MCNC: 156 MG/DL — HIGH (ref 70–99)
GLUCOSE BLDC GLUCOMTR-MCNC: 94 MG/DL — SIGNIFICANT CHANGE UP (ref 70–99)
GLUCOSE SERPL-MCNC: 166 MG/DL — HIGH (ref 70–99)
HCT VFR BLD CALC: 46.1 % — SIGNIFICANT CHANGE UP (ref 42–52)
HDLC SERPL-MCNC: 46 MG/DL — SIGNIFICANT CHANGE UP
HGB BLD-MCNC: 15.6 G/DL — SIGNIFICANT CHANGE UP (ref 14–18)
IMM GRANULOCYTES NFR BLD AUTO: 1 % — HIGH (ref 0.1–0.3)
LIPID PNL WITH DIRECT LDL SERPL: 100 MG/DL — HIGH
LYMPHOCYTES # BLD AUTO: 0.66 K/UL — LOW (ref 1.2–3.4)
LYMPHOCYTES # BLD AUTO: 11.1 % — LOW (ref 20.5–51.1)
MCHC RBC-ENTMCNC: 30.4 PG — SIGNIFICANT CHANGE UP (ref 27–31)
MCHC RBC-ENTMCNC: 33.8 G/DL — SIGNIFICANT CHANGE UP (ref 32–37)
MCV RBC AUTO: 89.9 FL — SIGNIFICANT CHANGE UP (ref 80–94)
MONOCYTES # BLD AUTO: 0.62 K/UL — HIGH (ref 0.1–0.6)
MONOCYTES NFR BLD AUTO: 10.4 % — HIGH (ref 1.7–9.3)
NEUTROPHILS # BLD AUTO: 4.45 K/UL — SIGNIFICANT CHANGE UP (ref 1.4–6.5)
NEUTROPHILS NFR BLD AUTO: 74.5 % — SIGNIFICANT CHANGE UP (ref 42.2–75.2)
NON HDL CHOLESTEROL: 110 MG/DL — SIGNIFICANT CHANGE UP
NRBC # BLD: 0 /100 WBCS — SIGNIFICANT CHANGE UP (ref 0–0)
PLATELET # BLD AUTO: 265 K/UL — SIGNIFICANT CHANGE UP (ref 130–400)
POTASSIUM SERPL-MCNC: 4.6 MMOL/L — SIGNIFICANT CHANGE UP (ref 3.5–5)
POTASSIUM SERPL-SCNC: 4.6 MMOL/L — SIGNIFICANT CHANGE UP (ref 3.5–5)
PROT SERPL-MCNC: 6.4 G/DL — SIGNIFICANT CHANGE UP (ref 6–8)
RBC # BLD: 5.13 M/UL — SIGNIFICANT CHANGE UP (ref 4.7–6.1)
RBC # FLD: 12.7 % — SIGNIFICANT CHANGE UP (ref 11.5–14.5)
SODIUM SERPL-SCNC: 139 MMOL/L — SIGNIFICANT CHANGE UP (ref 135–146)
TRIGL SERPL-MCNC: 69 MG/DL — SIGNIFICANT CHANGE UP
WBC # BLD: 5.97 K/UL — SIGNIFICANT CHANGE UP (ref 4.8–10.8)
WBC # FLD AUTO: 5.97 K/UL — SIGNIFICANT CHANGE UP (ref 4.8–10.8)

## 2021-07-30 PROCEDURE — 88311 DECALCIFY TISSUE: CPT | Mod: 26

## 2021-07-30 PROCEDURE — 88304 TISSUE EXAM BY PATHOLOGIST: CPT | Mod: 26

## 2021-07-30 PROCEDURE — 93923 UPR/LXTR ART STDY 3+ LVLS: CPT | Mod: 26

## 2021-07-30 PROCEDURE — 99223 1ST HOSP IP/OBS HIGH 75: CPT

## 2021-07-30 PROCEDURE — 73720 MRI LWR EXTREMITY W/O&W/DYE: CPT | Mod: 26,LT

## 2021-07-30 RX ADMIN — LISINOPRIL 10 MILLIGRAM(S): 2.5 TABLET ORAL at 05:41

## 2021-07-30 RX ADMIN — ATENOLOL 50 MILLIGRAM(S): 25 TABLET ORAL at 05:41

## 2021-07-30 RX ADMIN — Medication 100 MILLIGRAM(S): at 13:06

## 2021-07-30 RX ADMIN — Medication 100 MILLIGRAM(S): at 05:41

## 2021-07-30 RX ADMIN — Medication 1000 UNIT(S): at 12:49

## 2021-07-30 RX ADMIN — SODIUM CHLORIDE 70 MILLILITER(S): 9 INJECTION, SOLUTION INTRAVENOUS at 08:29

## 2021-07-30 NOTE — PROGRESS NOTE ADULT - ASSESSMENT
70 yo with PMHx of HTN, DLD, DMII, gastritis, L thyroid nodule, throat Ca s/p chemotherapy and RT (2016) in remission sent in to ED by Podiatry.    #Cellulitis, unlikely osteomyelitis   - No sepsis, leukocytosis, fevers  - ESR, CRP wnl  - Left foot xray revealed no findings of osteomyelitis  - Wound appears non purulent  - Podiatry following - add on today, possible debridement?  - LE Arterial Duplex - Normal arterial flow in the bilateral lower extremities. Diffuse atherosclerosis without focal stenosis bilaterally.  - ID consult  - s/p Vancomycin in ED, on Ancef now  - MRI done    #COVID positive  - States that he first tested positive 3-4 weeks ago when exposed to wife  - Against getting vaccinated  - Asymptomatic, continue to monitor    #DM-II  - Will obtain A1c, states that A1c was 7 in outpt office  - Will start SS, escalate as needed    #HTN  - C/w Atenolol, Lisinopril    #DLD  - Lipid profile  - C/w Simvastatin    Misc:  # DVT ppx: Lovenox  # GI ppx: Not indicated  # Diet: DASH, NPO after MN  # Activity: IAT  # Full Code  # Dispo: Acute 70 yo with PMHx of HTN, DLD, DMII, gastritis, L thyroid nodule, throat Ca s/p chemotherapy and RT (2016) in remission sent in to ED by Podiatry.    #Cellulitis, unlikely osteomyelitis   - No sepsis, leukocytosis, fevers  - ESR, CRP wnl  - Left foot xray revealed no findings of osteomyelitis  - Wound appears non purulent  - Podiatry following - scheduled debridement  - LE Arterial Duplex - Normal arterial flow in the bilateral lower extremities. Diffuse atherosclerosis without focal stenosis bilaterally.  - Vascular PA recommended PVR (VA Physiol Extremity 3+ Level)  - MRI Left foot (7/30): No evidence of osteomyelitis. Second and third metatarsophalangeal joint capsulitis. Neuropathic osteoarthropathy of the tarsometatarsal joints, partially imaged.  - ID following. Agree w/ Cefazolin IV      #COVID positive  - States that he first tested positive 3-4 weeks ago when exposed to wife  - Against getting vaccinated  - Asymptomatic, continue to monitor  - ID consulted. no therapy    #DM-II  - Will obtain A1c, states that A1c was 7 in outpt office  - Will start SS, escalate as needed    #HTN  - C/w Atenolol, Lisinopril    #DLD  - Lipid profile  - C/w Simvastatin    Misc:  # DVT ppx: Lovenox  # GI ppx: Not indicated  # Diet: DASH, NPO after MN  # Activity: IAT  # Full Code  # Dispo: Acute

## 2021-07-30 NOTE — PRE-OP CHECKLIST - BSA (M2)
Patient:   BALBIR STRONG            MRN: [id number]            FIN: [id number]              Age:   8 years     Sex:  FEMALE     :  13   Associated Diagnoses:   None   Author:   EDITH AGUIAR     Basic Information   Additional information: Chief Complaint from Nursing Triage Note : Chief Complaint ED  20 10:06 CST       Chief Complaint ED        Pt to PED with vomiting x3 days. unable to yoana PO. Fevers x2 days.  .     History of Present Illness   HPI: 8-year-old otherwise healthy female presents with emesis after eating and fevers with associated sore throat for 3 days.  Mom states every time she gives her fluids or liquids she immediately throws it up.  Emesis looks like when she just consumed.  This has occurred roughly 3-4 times per day.  Patient does state she has a sore throat.  Older brother also with similar symptoms but his have since resolved.  PMHx: none  Meds: none  Allergies: NKDA   Immunization Hx:  Source believes patient is up to date.         Review of Systems   Constitutional: negative except as stated in HPI  Ear/Nose/Mouth/Throat/Eye: negative except as stated in HPI  Cardiovascular: negative except as stated in HPI  Respiratory: negative except as stated in HPI  Gastrointestinal: negative except as stated in HPI  Genitourinary: negative except as stated in HPI  Musculoskeletal: negative except as stated in HPI  Integumentary: negative except as stated in HPI  Hematology/Lymphatics: negative except as stated in HPI  Neurologic: negative except as stated in HPI  Additional review of systems information: all other systems reviewed and otherwise negative        Physical Examination   Vitals between:   2020 10:22:36   TO   2020 10:22:36                   LAST RESULT MINIMUM MAXIMUM  Temperature 36.8 36.8 36.8  Heart Rate 121 121 121  Respiratory Rate 26 26 26  NISBP           125 125 125  NIDBP           82 82 82  NIMBP           96 96 96  SpO2                     99 99 99    General: no acute distress, non toxic  HEENT: Normocephalic, atraumatic, TMs clear bilaterally, no nasal congestion, tonsils 3+ bl with exudates on right tonsil  Neck: No cervical lymphadenopathy  Respiratory: Clear to auscultation bilaterally without wheezing, crackles, or rales  Cardiovascular: Regular rate and rhythm, good peripheral pulses, capillary refill <2 seconds  Abdomen: Normal active bowl sounds, non-tender to palpation, no large masses or organomegally  MSK: No joint swelling  Neuro: No focal deficits  Skin: No rashes        Medical Decision Making   Rapid strep neg  PO zofran and PO challenge successful            Reexamination/ Reevaluation         Impression and Plan   8-year-old female otherwise healthy presents with emesis pharyngitis, fevers for 3 days.  Patient found to likely have viral gastroenteritis.  Zofran and tolerated p.o.  Patient will be discharged home with Zofran and return precautions.  Diagnoses: likely viral gastroenteritis  Dispo: Home with Zofran and return precautions  Follow up: with PCP, supportive care, discussed return precautions at bedside, caregiver in agreement with plan.  Mercedes Tobin DO  PGY-3 Pediatrics          Addendum   2.19

## 2021-07-30 NOTE — CONSULT NOTE ADULT - ASSESSMENT
ASSESSMENT  72 yo with PMHx of HTN, DLD, DMII, gastritis, L thyroid nodule, throat Ca s/p chemotherapy and RT (2016) in remission sent in to ED by Podiatry. Pt has had a poorly healing toe wound after power washing his backyard and hitting his toe about four weeks ago.      IMPRESSION  #L 4th digit wound with PTB    < from: Xray Foot AP + Lateral + Oblique, Left (07.29.21 @ 12:53) >No acute fracture or malalignment. No current radiographic evidence of osteomyelitis.    C-Reactive Protein, Serum: <3 mg/L (07-29-21 @ 12:39)  #COVID PCR +, recent infection a few weeks ago    COVID-19 PCR: Detected (07-29-21 @ 12:40)  #Sepsis ruled out on admission   #Transaminitis     Creatinine, Serum: 0.9 (07-29-21 @ 12:39)    Weight (kg): 94.3 (07-30-21 @ 05:58)    RECOMMENDATIONS  This is an incomplete consult note. All final recommendations to follow after interview and examination of the patient. Please follow recommendations noted below.    If any questions, please call or send a message on charity: [employer] Teams  Please continue to update ID with any pertinent new laboratory or radiographic findings  Spectra [id number]     ASSESSMENT  72 yo with PMHx of HTN, DLD, DMII, gastritis, L thyroid nodule, throat Ca s/p chemotherapy and RT (2016) in remission sent in to ED by Podiatry. Pt has had a poorly healing toe wound after power washing his backyard and hitting his toe about four weeks ago.      IMPRESSION  #L 4th digit wound with PTB, rule out osteomyelitis     < from: Xray Foot AP + Lateral + Oblique, Left (07.29.21 @ 12:53) >No acute fracture or malalignment. No current radiographic evidence of osteomyelitis.    C-Reactive Protein, Serum: <3 mg/L (07-29-21 @ 12:39)  #COVID PCR +, recent infection a few weeks ago    COVID-19 PCR: Detected (07-29-21 @ 12:40)  #Sepsis ruled out on admission   #Transaminitis     Creatinine, Serum: 0.9 (07-29-21 @ 12:39)    Weight (kg): 94.3 (07-30-21 @ 05:58)    RECOMMENDATIONS  - agree with MRI  - ESR, CRP  - on cefazolin IV  - Podiatry  - For COVID PCR, likely viral shedding as known infection a few weeks ago- no therapy    If any questions, please call or send a message on [employer] Teams  Please continue to update ID with any pertinent new laboratory or radiographic findings  Spectra [id number]

## 2021-07-30 NOTE — PROGRESS NOTE ADULT - SUBJECTIVE AND OBJECTIVE BOX
VERN CHAVARRIA 71y Male  MRN#: [id number]     SUBJECTIVE  Patient is a 71y old Male who presents with a chief complaint of cellulitis (30 Jul 2021 07:27)    HPI:  72 yo with PMHx of HTN, DLD, DMII, gastritis, L thyroid nodule, throat Ca s/p chemotherapy and RT (2016) in remission sent in to ED by Podiatry. Pt has had a poorly healing toe wound after power washing his backyard and hitting his toe about four weeks ago. Reports that since then, his wound has not been healing, frequently painful, especially when he wears sneakers. Denies purulent drainage, malodor, fevers, chills, malaise, chest pain, cough.   In the ED, T 96.5, /74, HR 62, RR 18, SpO2 98% on RA. Lab work unremarkable. L foot XRay revealed no findings of osteomyelitis. Also found to be COVID positive. States that his wife got COVID about three weeks ago, after which he tested positive but only had mild headache and back pain for 1 day. Currently asymptomatic. (29 Jul 2021 19:53)    Interval Events:    Today is hospital day 1d, and this morning he is lying in bed without distress.   No acute overnight events.     OBJECTIVE  PAST MEDICAL & SURGICAL HISTORY  HTN (hypertension)    DM2 (diabetes mellitus, type 2)    Thyroid cancer    No significant past surgical history      ALLERGIES:  No Known Allergies    MEDICATIONS:  STANDING MEDICATIONS  ATENolol  Tablet 50 milliGRAM(s) Oral daily  ceFAZolin   IVPB 1000 milliGRAM(s) IV Intermittent every 8 hours  chlorhexidine 4% Liquid 1 Application(s) Topical <User Schedule>  cholecalciferol 1000 Unit(s) Oral daily  enoxaparin Injectable 40 milliGRAM(s) SubCutaneous at bedtime  insulin lispro (ADMELOG) corrective regimen sliding scale   SubCutaneous three times a day before meals  lactated ringers. 1000 milliLiter(s) IV Continuous <Continuous>  lisinopril 10 milliGRAM(s) Oral daily  simvastatin 5 milliGRAM(s) Oral at bedtime    PRN MEDICATIONS    HOME MEDICATIONS  Home Medications:  atenolol 50 mg oral tablet: 1 tab(s) orally once a day (29 Jul 2021 20:58)  lisinopril 10 mg oral tablet: 1 tab(s) orally once a day (29 Jul 2021 20:57)  metFORMIN 500 mg oral tablet: 1 tab(s) orally 2 times a day (29 Jul 2021 20:57)  simvastatin 5 mg oral tablet: 1 tab(s) orally once a day (at bedtime) (29 Jul 2021 20:57)  Vitamin D3 25 mcg (1000 intl units) oral tablet: 1 tab(s) orally once a day (29 Jul 2021 20:57)      LABS:                        15.6   5.97  )-----------( 265      ( 30 Jul 2021 08:14 )             46.1     07-30    139  |  102  |  11  ----------------------------<  166<H>  4.6   |  27  |  0.8    Ca    9.4      30 Jul 2021 08:14    TPro  6.4  /  Alb  4.3  /  TBili  0.7  /  DBili  x   /  AST  47<H>  /  ALT  47<H>  /  AlkPhos  54  07-30    LIVER FUNCTIONS - ( 30 Jul 2021 08:14 )  Alb: 4.3 g/dL / Pro: 6.4 g/dL / ALK PHOS: 54 U/L / ALT: 47 U/L / AST: 47 U/L / GGT: x           PT/INR - ( 29 Jul 2021 12:39 )   PT: 11.70 sec;   INR: 1.02 ratio         PTT - ( 29 Jul 2021 12:39 )  PTT:33.6 sec      Lactate, Blood: 1.4 mmol/L (07-29-21 @ 12:39)  Sedimentation Rate, Erythrocyte: 3 mm/Hr (07-29-21 @ 12:39)          CAPILLARY BLOOD GLUCOSE      POCT Blood Glucose.: 154 mg/dL (30 Jul 2021 07:51)      PHYSICAL EXAM:  T(C): 36.1 (07-30-21 @ 05:44), Max: 36.2 (07-29-21 @ 21:13)  HR: 61 (07-30-21 @ 05:44) (50 - 61)  BP: 147/71 (07-30-21 @ 05:44) (124/60 - 147/71)  RR: 18 (07-30-21 @ 05:44) (18 - 18)  SpO2: 99% (07-30-21 @ 05:58) (98% - 100%)    GENERAL: NAD, well-developed, 71y  EENT: EOMI, conjunctiva and sclera clear, No nasal obstruction or discharge  RESPIRATORY: Clear to auscultation bilaterally; No wheeze or crackles  CARDIOVASCULAR: Regular rate and rhythm; No murmurs, rubs, or gallops, no pitting edema  GASTROINTESTINAL: Abdomen Soft, Nontender, Nondistended, +BS  MUSCULOSKELETAL:  No cyanosis, extremities grossly symmetrical  PSYCH: AAOx3, affect appropriate  NEUROLOGY: non-focal, cognition grossly intact, MAEE    ADMISSION SUMMARY  Patient is a 71y old Male who presents with a chief complaint of cellulitis (30 Jul 2021 07:27)

## 2021-07-30 NOTE — CONSULT NOTE ADULT - SUBJECTIVE AND OBJECTIVE BOX
VERN CHAVARRIA  71y, Male  Allergy: No Known Allergies      CHIEF COMPLAINT:   cellulitis (29 Jul 2021 19:53)      LOS  1d    HPI  HPI:  70 yo with PMHx of HTN, DLD, DMII, gastritis, L thyroid nodule, throat Ca s/p chemotherapy and RT (2016) in remission sent in to ED by Podiatry. Pt has had a poorly healing toe wound after power washing his backyard and hitting his toe about four weeks ago. Reports that since then, his wound has not been healing, frequently painful, especially when he wears sneakers. Denies purulent drainage, malodor, fevers, chills, malaise, chest pain, cough.   In the ED, T 96.5, /74, HR 62, RR 18, SpO2 98% on RA. Lab work unremarkable. L foot XRay revealed no findings of osteomyelitis. Also found to be COVID positive. States that his wife got COVID about three weeks ago, after which he tested positive but only had mild headache and back pain for 1 day. Currently asymptomatic. (29 Jul 2021 19:53)      INFECTIOUS DISEASE HISTORY:  ID consulted for L 4th digit infection    PMH  PAST MEDICAL & SURGICAL HISTORY:  HTN (hypertension)    DM2 (diabetes mellitus, type 2)    Thyroid cancer    No significant past surgical history        FAMILY HISTORY  No pertinent family history in first degree relatives        SOCIAL HISTORY  Social History:  Lives at home with wife, recently retired.   Denies currently smoking, quit over 25 yrs ago. Denies alcohol or illicit drug use. (29 Jul 2021 19:53)        ROS  ***    VITALS:  T(F): 97, Max: 97.2 (07-29-21 @ 21:13)  HR: 61  BP: 147/71  RR: 18Vital Signs Last 24 Hrs  T(C): 36.1 (30 Jul 2021 05:44), Max: 36.2 (29 Jul 2021 21:13)  T(F): 97 (30 Jul 2021 05:44), Max: 97.2 (29 Jul 2021 21:13)  HR: 61 (30 Jul 2021 05:44) (50 - 62)  BP: 147/71 (30 Jul 2021 05:44) (124/60 - 147/71)  BP(mean): --  RR: 18 (30 Jul 2021 05:44) (18 - 18)  SpO2: 99% (30 Jul 2021 05:58) (98% - 100%)    PHYSICAL EXAM:  ***    TESTS & MEASUREMENTS:                        15.9   6.60  )-----------( 262      ( 29 Jul 2021 12:39 )             46.3     07-29    135  |  99  |  15  ----------------------------<  154<H>  4.3   |  23  |  0.9    Ca    9.8      29 Jul 2021 12:39    TPro  7.0  /  Alb  4.8  /  TBili  0.8  /  DBili  x   /  AST  78<H>  /  ALT  61<H>  /  AlkPhos  59  07-29    eGFR if Non African American: 87 mL/min/1.73M2 (07-29-21 @ 12:39)  eGFR if : 101 mL/min/1.73M2 (07-29-21 @ 12:39)    LIVER FUNCTIONS - ( 29 Jul 2021 12:39 )  Alb: 4.8 g/dL / Pro: 7.0 g/dL / ALK PHOS: 59 U/L / ALT: 61 U/L / AST: 78 U/L / GGT: x                 Lactate, Blood: 1.4 mmol/L (07-29-21 @ 12:39)      INFECTIOUS DISEASES TESTING  COVID-19 PCR: Detected (07-29-21 @ 12:40)      INFLAMMATORY MARKERS  C-Reactive Protein, Serum: <3 mg/L (07-29-21 @ 12:39)      RADIOLOGY & ADDITIONAL TESTS:  I have personally reviewed the last Chest xray  CXR  Xray Chest 1 View- PORTABLE-Urgent:   EXAM:  XR CHEST PORTABLE URGENT 1V          *** ADDENDUM 07/29/2021  ***    A questionable asymmetric density left apex is noted. Follow-up PA and lateral views suggested.    --- End of Report ---    *** END OF ADDENDUM 07/29/2021  ***        PROCEDURE DATE:  07/29/2021            INTERPRETATION:  Clinical History/Reason for Exam:  Sepsis    Comparison: None.    CORRELATION:  CT PT HEAD 3/30/2016.      Findings:    Technique/Positioning:  @Technique@.    Support devices:  none    Cardiac/mediastinum/hilum: Unremarkable    Lung parenchyma/ Pleura: No focal parenchymal opacities, effusion or pneumothorax is present.      Skeleton/soft tissues: Degenerative changes thoracic spine      Impression:    No consolidation, effusion or pneumothorax.          --- End of Report ---      ***Please see the addendum at the top of this report. It may contain additional important information or changes.****        MEET LOUIS MD; Attending Radiologist  This document has been electronically signed. Jul 29 2021  2:36PM  Addend:MEET LOUIS MD; Attending Radiologist  This addendum was electronically signed on: Jul 29 2021  2:40PM. (07-29-21 @ 12:53)      CT      CARDIOLOGY TESTING  12 Lead ECG:   Ventricular Rate 52 BPM    Atrial Rate 52 BPM    P-R Interval 176 ms    QRS Duration 90 ms    Q-T Interval 426 ms    QTC Calculation(Bazett) 396 ms    P Axis 19 degrees    R Axis 49 degrees    T Axis 25 degrees    Diagnosis Line Sinus bradycardia  Otherwise normal ECG    Confirmed by Con Cruz ([id number]) on 7/29/2021 4:35:47 PM (07-29-21 @ 14:16)      MEDICATIONS  ATENolol  Tablet 50 Oral daily  ceFAZolin   IVPB 1000 IV Intermittent every 8 hours  chlorhexidine 4% Liquid 1 Topical <User Schedule>  cholecalciferol 1000 Oral daily  enoxaparin Injectable 40 SubCutaneous at bedtime  insulin lispro (ADMELOG) corrective regimen sliding scale  SubCutaneous three times a day before meals  lactated ringers. 1000 IV Continuous <Continuous>  lisinopril 10 Oral daily  simvastatin 5 Oral at bedtime        ANTIBIOTICS:  ceFAZolin   IVPB 1000 milliGRAM(s) IV Intermittent every 8 hours      ALLERGIES:  No Known Allergies       VERN CHAVARRIA  71y, Male  Allergy: No Known Allergies      CHIEF COMPLAINT:   cellulitis (29 Jul 2021 19:53)      LOS  1d    HPI  HPI:  72 yo with PMHx of HTN, DLD, DMII, gastritis, L thyroid nodule, throat Ca s/p chemotherapy and RT (2016) in remission sent in to ED by Podiatry. Pt has had a poorly healing toe wound after power washing his backyard and hitting his toe about four weeks ago. Reports that since then, his wound has not been healing, frequently painful, especially when he wears sneakers. Denies purulent drainage, malodor, fevers, chills, malaise, chest pain, cough.   In the ED, T 96.5, /74, HR 62, RR 18, SpO2 98% on RA. Lab work unremarkable. L foot XRay revealed no findings of osteomyelitis. Also found to be COVID positive. States that his wife got COVID about three weeks ago, after which he tested positive but only had mild headache and back pain for 1 day. Currently asymptomatic. (29 Jul 2021 19:53)      INFECTIOUS DISEASE HISTORY:  ID consulted for L 4th digit infection  no fever  no chills      PMH  PAST MEDICAL & SURGICAL HISTORY:  HTN (hypertension)    DM2 (diabetes mellitus, type 2)    Thyroid cancer    No significant past surgical history        FAMILY HISTORY  No pertinent family history in first degree relatives        SOCIAL HISTORY  Social History:  Lives at home with wife, recently retired.   Denies currently smoking, quit over 25 yrs ago. Denies alcohol or illicit drug use. (29 Jul 2021 19:53)        ROS  General: Denies rigors, nightsweats  HEENT: Denies headache, rhinorrhea, sore throat, eye pain  CV: Denies CP, palpitations  PULM: Denies wheezing, hemoptysis  GI: Denies hematemesis, hematochezia, melena  : Denies discharge, hematuria  MSK: Denies arthralgias, myalgias  SKIN: Denies rash, lesions  NEURO: Denies paresthesias, weakness  PSYCH: Denies depression, anxiety     VITALS:  T(F): 97, Max: 97.2 (07-29-21 @ 21:13)  HR: 61  BP: 147/71  RR: 18Vital Signs Last 24 Hrs  T(C): 36.1 (30 Jul 2021 05:44), Max: 36.2 (29 Jul 2021 21:13)  T(F): 97 (30 Jul 2021 05:44), Max: 97.2 (29 Jul 2021 21:13)  HR: 61 (30 Jul 2021 05:44) (50 - 62)  BP: 147/71 (30 Jul 2021 05:44) (124/60 - 147/71)  BP(mean): --  RR: 18 (30 Jul 2021 05:44) (18 - 18)  SpO2: 99% (30 Jul 2021 05:58) (98% - 100%)    PHYSICAL EXAM:  Gen: NAD, resting in bed  HEENT: Normocephalic, atraumatic  Neck: supple, no lymphadenopathy  CV: Regular rate & regular rhythm  Lungs: decreased BS at bases, no fremitus  Abdomen: Soft, BS present  Ext: Warm, well perfused  Neuro: non focal, awake  Skin: no rash, no erythema, L 4th digit ulcer- clean mild erythema  Lines: no phlebitis     TESTS & MEASUREMENTS:                        15.9   6.60  )-----------( 262      ( 29 Jul 2021 12:39 )             46.3     07-29    135  |  99  |  15  ----------------------------<  154<H>  4.3   |  23  |  0.9    Ca    9.8      29 Jul 2021 12:39    TPro  7.0  /  Alb  4.8  /  TBili  0.8  /  DBili  x   /  AST  78<H>  /  ALT  61<H>  /  AlkPhos  59  07-29    eGFR if Non African American: 87 mL/min/1.73M2 (07-29-21 @ 12:39)  eGFR if : 101 mL/min/1.73M2 (07-29-21 @ 12:39)    LIVER FUNCTIONS - ( 29 Jul 2021 12:39 )  Alb: 4.8 g/dL / Pro: 7.0 g/dL / ALK PHOS: 59 U/L / ALT: 61 U/L / AST: 78 U/L / GGT: x                 Lactate, Blood: 1.4 mmol/L (07-29-21 @ 12:39)      INFECTIOUS DISEASES TESTING  COVID-19 PCR: Detected (07-29-21 @ 12:40)      INFLAMMATORY MARKERS  C-Reactive Protein, Serum: <3 mg/L (07-29-21 @ 12:39)      RADIOLOGY & ADDITIONAL TESTS:  I have personally reviewed the last Chest xray  CXR  Xray Chest 1 View- PORTABLE-Urgent:   EXAM:  XR CHEST PORTABLE URGENT 1V          *** ADDENDUM 07/29/2021  ***    A questionable asymmetric density left apex is noted. Follow-up PA and lateral views suggested.    --- End of Report ---    *** END OF ADDENDUM 07/29/2021  ***        PROCEDURE DATE:  07/29/2021            INTERPRETATION:  Clinical History/Reason for Exam:  Sepsis    Comparison: None.    CORRELATION:  CT PT HEAD 3/30/2016.      Findings:    Technique/Positioning:  @Technique@.    Support devices:  none    Cardiac/mediastinum/hilum: Unremarkable    Lung parenchyma/ Pleura: No focal parenchymal opacities, effusion or pneumothorax is present.      Skeleton/soft tissues: Degenerative changes thoracic spine      Impression:    No consolidation, effusion or pneumothorax.          --- End of Report ---      ***Please see the addendum at the top of this report. It may contain additional important information or changes.****        MEET LOUIS MD; Attending Radiologist  This document has been electronically signed. Jul 29 2021  2:36PM  Addend:MEET LOUIS MD; Attending Radiologist  This addendum was electronically signed on: Jul 29 2021  2:40PM. (07-29-21 @ 12:53)      CT      CARDIOLOGY TESTING  12 Lead ECG:   Ventricular Rate 52 BPM    Atrial Rate 52 BPM    P-R Interval 176 ms    QRS Duration 90 ms    Q-T Interval 426 ms    QTC Calculation(Bazett) 396 ms    P Axis 19 degrees    R Axis 49 degrees    T Axis 25 degrees    Diagnosis Line Sinus bradycardia  Otherwise normal ECG    Confirmed by Con Cruz ([id number]) on 7/29/2021 4:35:47 PM (07-29-21 @ 14:16)      MEDICATIONS  ATENolol  Tablet 50 Oral daily  ceFAZolin   IVPB 1000 IV Intermittent every 8 hours  chlorhexidine 4% Liquid 1 Topical <User Schedule>  cholecalciferol 1000 Oral daily  enoxaparin Injectable 40 SubCutaneous at bedtime  insulin lispro (ADMELOG) corrective regimen sliding scale  SubCutaneous three times a day before meals  lactated ringers. 1000 IV Continuous <Continuous>  lisinopril 10 Oral daily  simvastatin 5 Oral at bedtime        ANTIBIOTICS:  ceFAZolin   IVPB 1000 milliGRAM(s) IV Intermittent every 8 hours      ALLERGIES:  No Known Allergies

## 2021-07-30 NOTE — PRE-OP CHECKLIST - WEIGHT IN KG
-- Message is from the [facility]--    Patient is requesting a medication refill - medication is on active medication list    Patient is currently OUT of the requested medication.    Was Medication Pended?  No.     Rx Name and Dose:    losartan-hydrochlorothiazide (HYZAAR) 100-25 MG per tablet         Duration: 30 days    Pharmacy  [facility] [id number] [place] [address] [address]    Patient confirmed the above pharmacy as correct?  Yes    Caller Information       Type Contact Phone    06/14/2019 07:48 AM Phone (Incoming) Dane Gee (Self) [phone] (H)          Alternative phone number: [phone]    Turnaround time given to caller:   \"This message will be sent to [state Provider's name]. The clinical team will fulfill your request as soon as they review your message.\"   94.3

## 2021-07-30 NOTE — PRE-ANESTHESIA EVALUATION ADULT - NSANTHADDINFOFT_GEN_ALL_CORE
Procedure/Risks explained for moderate/deep sedation + routine monitoring v. GA with LMA v. ETT + routine monitoring.  Patient understands the stated anesthetic plan for both anesthetic approaches and agrees to proceed.

## 2021-07-31 ENCOUNTER — TRANSCRIPTION ENCOUNTER (OUTPATIENT)
Age: 71
End: 2021-07-31

## 2021-07-31 VITALS
TEMPERATURE: 97 F | OXYGEN SATURATION: 97 % | HEART RATE: 58 BPM | RESPIRATION RATE: 18 BRPM | DIASTOLIC BLOOD PRESSURE: 88 MMHG | SYSTOLIC BLOOD PRESSURE: 135 MMHG

## 2021-07-31 LAB
COVID-19 SPIKE DOMAIN AB INTERP: POSITIVE
COVID-19 SPIKE DOMAIN ANTIBODY RESULT: 16 U/ML — HIGH
GLUCOSE BLDC GLUCOMTR-MCNC: 134 MG/DL — HIGH (ref 70–99)
GLUCOSE BLDC GLUCOMTR-MCNC: 181 MG/DL — HIGH (ref 70–99)
SARS-COV-2 IGG+IGM SERPL QL IA: 16 U/ML — HIGH
SARS-COV-2 IGG+IGM SERPL QL IA: POSITIVE

## 2021-07-31 PROCEDURE — 73630 X-RAY EXAM OF FOOT: CPT | Mod: 26,LT

## 2021-07-31 PROCEDURE — 99239 HOSP IP/OBS DSCHRG MGMT >30: CPT

## 2021-07-31 RX ORDER — CHLORHEXIDINE GLUCONATE 213 G/1000ML
1 SOLUTION TOPICAL
Refills: 0 | Status: DISCONTINUED | OUTPATIENT
Start: 2021-07-31 | End: 2021-07-31

## 2021-07-31 RX ORDER — ENOXAPARIN SODIUM 100 MG/ML
40 INJECTION SUBCUTANEOUS AT BEDTIME
Refills: 0 | Status: DISCONTINUED | OUTPATIENT
Start: 2021-07-31 | End: 2021-07-31

## 2021-07-31 RX ORDER — OXYCODONE AND ACETAMINOPHEN 5; 325 MG/1; MG/1
1 TABLET ORAL ONCE
Refills: 0 | Status: DISCONTINUED | OUTPATIENT
Start: 2021-07-31 | End: 2021-07-31

## 2021-07-31 RX ORDER — MORPHINE SULFATE 50 MG/1
2 CAPSULE, EXTENDED RELEASE ORAL
Refills: 0 | Status: DISCONTINUED | OUTPATIENT
Start: 2021-07-31 | End: 2021-07-31

## 2021-07-31 RX ORDER — INSULIN LISPRO 100/ML
VIAL (ML) SUBCUTANEOUS
Refills: 0 | Status: DISCONTINUED | OUTPATIENT
Start: 2021-07-31 | End: 2021-07-31

## 2021-07-31 RX ORDER — SODIUM CHLORIDE 9 MG/ML
1000 INJECTION, SOLUTION INTRAVENOUS
Refills: 0 | Status: DISCONTINUED | OUTPATIENT
Start: 2021-07-31 | End: 2021-07-31

## 2021-07-31 RX ORDER — SIMVASTATIN 20 MG/1
5 TABLET, FILM COATED ORAL AT BEDTIME
Refills: 0 | Status: DISCONTINUED | OUTPATIENT
Start: 2021-07-31 | End: 2021-07-31

## 2021-07-31 RX ORDER — HYDROMORPHONE HYDROCHLORIDE 2 MG/ML
0.5 INJECTION INTRAMUSCULAR; INTRAVENOUS; SUBCUTANEOUS
Refills: 0 | Status: DISCONTINUED | OUTPATIENT
Start: 2021-07-31 | End: 2021-07-31

## 2021-07-31 RX ORDER — ACETAMINOPHEN 500 MG
650 TABLET ORAL EVERY 6 HOURS
Refills: 0 | Status: DISCONTINUED | OUTPATIENT
Start: 2021-07-31 | End: 2021-07-31

## 2021-07-31 RX ORDER — CEFAZOLIN SODIUM 1 G
1000 VIAL (EA) INJECTION EVERY 8 HOURS
Refills: 0 | Status: DISCONTINUED | OUTPATIENT
Start: 2021-07-31 | End: 2021-07-31

## 2021-07-31 RX ORDER — CHOLECALCIFEROL (VITAMIN D3) 125 MCG
1000 CAPSULE ORAL DAILY
Refills: 0 | Status: DISCONTINUED | OUTPATIENT
Start: 2021-07-31 | End: 2021-07-31

## 2021-07-31 RX ORDER — ATENOLOL 25 MG/1
50 TABLET ORAL DAILY
Refills: 0 | Status: DISCONTINUED | OUTPATIENT
Start: 2021-07-31 | End: 2021-07-31

## 2021-07-31 RX ORDER — LISINOPRIL 2.5 MG/1
10 TABLET ORAL DAILY
Refills: 0 | Status: DISCONTINUED | OUTPATIENT
Start: 2021-07-31 | End: 2021-07-31

## 2021-07-31 RX ORDER — ONDANSETRON 8 MG/1
4 TABLET, FILM COATED ORAL ONCE
Refills: 0 | Status: DISCONTINUED | OUTPATIENT
Start: 2021-07-31 | End: 2021-07-31

## 2021-07-31 RX ADMIN — Medication 1000 UNIT(S): at 11:10

## 2021-07-31 RX ADMIN — Medication 100 MILLIGRAM(S): at 13:10

## 2021-07-31 RX ADMIN — CHLORHEXIDINE GLUCONATE 1 APPLICATION(S): 213 SOLUTION TOPICAL at 06:12

## 2021-07-31 RX ADMIN — SODIUM CHLORIDE 75 MILLILITER(S): 9 INJECTION, SOLUTION INTRAVENOUS at 00:26

## 2021-07-31 RX ADMIN — Medication 2: at 11:20

## 2021-07-31 RX ADMIN — HYDROMORPHONE HYDROCHLORIDE 0.5 MILLIGRAM(S): 2 INJECTION INTRAMUSCULAR; INTRAVENOUS; SUBCUTANEOUS at 06:47

## 2021-07-31 RX ADMIN — Medication 650 MILLIGRAM(S): at 14:29

## 2021-07-31 RX ADMIN — Medication 650 MILLIGRAM(S): at 13:59

## 2021-07-31 RX ADMIN — Medication 100 MILLIGRAM(S): at 06:12

## 2021-07-31 RX ADMIN — HYDROMORPHONE HYDROCHLORIDE 0.5 MILLIGRAM(S): 2 INJECTION INTRAMUSCULAR; INTRAVENOUS; SUBCUTANEOUS at 06:17

## 2021-07-31 NOTE — CHART NOTE - NSCHARTNOTEFT_GEN_A_CORE
<<<RESIDENT DISCHARGE NOTE>>>     VERN CHAVARRIA  MRN-[id number]    VITAL SIGNS:  T(F): 97.3 (07-31-21 @ 13:03), Max: 98.8 (07-31-21 @ 00:01)  HR: 58 (07-31-21 @ 13:03)  BP: 135/88 (07-31-21 @ 13:03)  SpO2: 97% (07-31-21 @ 13:03)      PHYSICAL EXAMINATION:  General: NAD  Head & Neck: Atraumatic, Normocephalic  Pulmonary: CTAL bilaterally  Cardiovascular: regular rate and rhythm, S1 and S2 present  Gastrointestinal/Abdomen & Pelvis: soft, nontender, nondistended, +BS  Neurologic/Motor: nonfocal, A&Ox3    TEST RESULTS:                        15.6   5.97  )-----------( 265      ( 30 Jul 2021 08:14 )             46.1       07-30    139  |  102  |  11  ----------------------------<  166<H>  4.6   |  27  |  0.8    Ca    9.4      30 Jul 2021 08:14    TPro  6.4  /  Alb  4.3  /  TBili  0.7  /  DBili  x   /  AST  47<H>  /  ALT  47<H>  /  AlkPhos  54  07-30      FINAL DISCHARGE INTERVIEW:  Resident(s) Present: (Name: Yadira Moreno PGY-1 ), RN Present: (Name:  ___________)    DISCHARGE MEDICATION RECONCILIATION  reviewed with Attending (Name: Dr. Maggie Cramer MD )    DISPOSITION:   [  ] Home,    [  ] Home with Visiting Nursing Services,   [    ]  SNF/ NH,    [   ] Acute Rehab (4A),   [   ] Other (Specify:_________)

## 2021-07-31 NOTE — DISCHARGE NOTE PROVIDER - CARE PROVIDER_API CALL
Avery Lainez  SURGERY  [address] [place] [address]  Phone: [phone]  Fax: [phone]  Follow Up Time: 1 week

## 2021-07-31 NOTE — DISCHARGE NOTE PROVIDER - NSDCFUADDINST_GEN_ALL_CORE_FT
For optimal care, please follow up with your podiatrist in 1 week about your procedure during this admission.    For optimal care, please take your medications as prescribed. Please continue to take the course of antibiotics for 2 weeks.

## 2021-07-31 NOTE — DISCHARGE NOTE PROVIDER - NSDCMRMEDTOKEN_GEN_ALL_CORE_FT
atenolol 50 mg oral tablet: 1 tab(s) orally once a day  lisinopril 10 mg oral tablet: 1 tab(s) orally once a day  metFORMIN 500 mg oral tablet: 1 tab(s) orally 2 times a day  simvastatin 5 mg oral tablet: 1 tab(s) orally once a day (at bedtime)  Vitamin D3 25 mcg (1000 intl units) oral tablet: 1 tab(s) orally once a day

## 2021-07-31 NOTE — DISCHARGE NOTE PROVIDER - HOSPITAL COURSE
HPI:  72 yo with PMHx of HTN, DLD, DMII, gastritis, L thyroid nodule, throat Ca s/p chemotherapy and RT (2016) in remission sent in to ED by Podiatry. Pt has had a poorly healing toe wound after power washing his backyard and hitting his toe about four weeks ago. Reports that since then, his wound has not been healing, frequently painful, especially when he wears sneakers. Denies purulent drainage, malodor, fevers, chills, malaise, chest pain, cough.   In the ED, T 96.5, /74, HR 62, RR 18, SpO2 98% on RA. Lab work unremarkable. L foot XRay revealed no findings of osteomyelitis. Also found to be COVID positive. States that his wife got COVID about three weeks ago, after which he tested positive but only had mild headache and back pain for 1 day. Currently asymptomatic. (29 Jul 2021 19:53)    Hospital Course:    Admitted to medicine for medical clearance for podiatry procedure. Patient had no fevers, leukocytosis, not found to be septic on admission. ESR and CRP was wnl. Patient on IV cefazolin. ID consulted and in agreement with antibiotic course.Patient had left foot xray with no findings of osteomyelitis. LE Arterial Duplex revealed normal arterial flow in the bilateral lower extremities and diffuse atherosclerosis without focal stenosis bilaterally. Vascular NP recommended PVR (VA Physiol Extremity 3+ Level) which showed normal arterial hemodynamics in the lower extremities bilaterally. MRI Left foot (7/30): No evidence of osteomyelitis, second and third metatarsophalangeal joint capsulitis, neuropathic osteoarthropathy of the tarsometatarsal joints, partially imaged. Patient had left 4th toe arthroplasty (7/30).

## 2021-07-31 NOTE — PROGRESS NOTE ADULT - SUBJECTIVE AND OBJECTIVE BOX
Podiatry Progress Note    Subjective:   VERN CHAVARRIA is a pleasant well-nourished, well developed 71y Male in no acute distress, alert awake, and oriented to person, place and time.  Patient is a 71y old  Male who presents with a chief complaint of cellulitis (30 Jul 2021 11:29). Pt seen at bedside.      PAST MEDICAL & SURGICAL HISTORY:  HTN (hypertension)    DM2 (diabetes mellitus, type 2)    Thyroid cancer    No significant past surgical history         Objective:  Vital Signs Last 24 Hrs  T(C): 36.2 (31 Jul 2021 04:50), Max: 37.1 (31 Jul 2021 00:01)  T(F): 97.1 (31 Jul 2021 04:50), Max: 98.8 (31 Jul 2021 00:01)  HR: 53 (31 Jul 2021 04:50) (46 - 59)  BP: 111/59 (31 Jul 2021 04:50) (111/59 - 157/73)  BP(mean): 94 (31 Jul 2021 00:31) (91 - 97)  RR: 18 (31 Jul 2021 04:50) (15 - 25)  SpO2: 98% (31 Jul 2021 08:32) (95% - 99%)                        15.6   5.97  )-----------( 265      ( 30 Jul 2021 08:14 )             46.1                 07-30    139  |  102  |  11  ----------------------------<  166<H>  4.6   |  27  |  0.8    Ca    9.4      30 Jul 2021 08:14    TPro  6.4  /  Alb  4.3  /  TBili  0.7  /  DBili  x   /  AST  47<H>  /  ALT  47<H>  /  AlkPhos  54  07-30    --------------  EXAM: PHYSIOL EXTREM LOW 3+ LEV BI  PROCEDURE DATE: 07/30/2021  INTERPRETATION: Clinical History / Reason for exam: The patient is a 71 years old male for evaluation of pulsatile waveforms in the arterial system in bilateral lower extremities.    The study was performed to assess the patient for peripheral occlusive disease.    Bilateral lower extremity pulse volume recordings and pressure analyses were obtained.    Evaluation of the bilateral lower extremities revealed normal PVR waveform amplitudes at the high thigh, low thigh, calf, ankle, metatarsal and digit levels. No significant pressure gradients were noted.    Ankle brachial index was 1.4 on the right & 1.5 on the left.    Impression:    Normal arterial hemodynamics in the lower extremities bilaterally.    --- End of Report ---    ROXI SANCHEZ MD; Attending Vascular Surgeon  This document has been electronically signed. Jul 31 2021 10:36AM    -----------------  EXAM: MR FOOT WAW IC LT  PROCEDURE DATE: 07/30/2021  INTERPRETATION: Clinical History / Reason for exam: Left fourth toe infection.  TECHNIQUE: Multiplanar multisequence MRI of the left forefoot pre and post administration of 10 cc Gadavist intravenous contrast was performed.  COMPARISON: Left foot radiographs July 29, 2021  FINDINGS:    There is no MRI evidence of osteomyelitis. There is neuropathic osteoarthropathy of the partially imaged tarsometatarsal joints with subchondral cysts/bone marrow edema across the first tarsometatarsal joint.    The flexor and extensor tendons are within normal limits. Second through fifth hammertoe deformities.    Intrinsic muscles of the foot demonstrate mild edema, consistent with neuropathy.    There is small first metatarsophalangeal joint effusion. There is edema plantar to the second and third metatarsophalangeal joints extending into the web spaces, consistent with capsulitis.    IMPRESSION:    No MRI evidence of osteomyelitis.    Second and third metatarsophalangeal joint capsulitis.    Neuropathic osteoarthropathy of the tarsometatarsal joints, partially imaged.    --- End of Report ---  NED UGALDE MD; Attending Radiologist  This document has been electronically signed. Jul 30 2021 1:40PM  -----------------      Assessment:   Diabetes  s/p Left 4th toe arthroplasty 7/30/21    Plan:  Chart reviewed and Patient evaluated. All Questions and Concerns Addressed and Answered  Discussed diagnosis and treatment with patient  Sx WCx and Sx BCx; pending  Post-op XR Imaging Left Foot; Pending  Lower Extremity Arterial Duplex B/L; See report above  MRI-Right Foot; See report above  Recommend ESR/CRP;   ID Consult appreciated  Follow Up w/ Wound Culture  Per Attending; Previous Wound Cx; Grew MRSA  WBAT in surgical shoe  Patient stable from Podiatry standpoint; can f/u w/ Dr. Lainez in clinic 1 week post d/c  Discussed Plan w/ Attending, Dr. Lainez    Podiatry      Podiatry Progress Note    Subjective:   VERN CHAVARRIA is a pleasant well-nourished, well developed 71y Male in no acute distress, alert awake, and oriented to person, place and time.  Patient is a 71y old  Male who presents with a chief complaint of cellulitis (30 Jul 2021 11:29). Pt seen at bedside.      PAST MEDICAL & SURGICAL HISTORY:  HTN (hypertension)    DM2 (diabetes mellitus, type 2)    Thyroid cancer    No significant past surgical history         Objective:  Vital Signs Last 24 Hrs  T(C): 36.2 (31 Jul 2021 04:50), Max: 37.1 (31 Jul 2021 00:01)  T(F): 97.1 (31 Jul 2021 04:50), Max: 98.8 (31 Jul 2021 00:01)  HR: 53 (31 Jul 2021 04:50) (46 - 59)  BP: 111/59 (31 Jul 2021 04:50) (111/59 - 157/73)  BP(mean): 94 (31 Jul 2021 00:31) (91 - 97)  RR: 18 (31 Jul 2021 04:50) (15 - 25)  SpO2: 98% (31 Jul 2021 08:32) (95% - 99%)                        15.6   5.97  )-----------( 265      ( 30 Jul 2021 08:14 )             46.1                 07-30    139  |  102  |  11  ----------------------------<  166<H>  4.6   |  27  |  0.8    Ca    9.4      30 Jul 2021 08:14    TPro  6.4  /  Alb  4.3  /  TBili  0.7  /  DBili  x   /  AST  47<H>  /  ALT  47<H>  /  AlkPhos  54  07-30    --------------  EXAM: PHYSIOL EXTREM LOW 3+ LEV BI  PROCEDURE DATE: 07/30/2021  INTERPRETATION: Clinical History / Reason for exam: The patient is a 71 years old male for evaluation of pulsatile waveforms in the arterial system in bilateral lower extremities.    The study was performed to assess the patient for peripheral occlusive disease.    Bilateral lower extremity pulse volume recordings and pressure analyses were obtained.    Evaluation of the bilateral lower extremities revealed normal PVR waveform amplitudes at the high thigh, low thigh, calf, ankle, metatarsal and digit levels. No significant pressure gradients were noted.    Ankle brachial index was 1.4 on the right & 1.5 on the left.    Impression:    Normal arterial hemodynamics in the lower extremities bilaterally.    --- End of Report ---    ROXI SANCHEZ MD; Attending Vascular Surgeon  This document has been electronically signed. Jul 31 2021 10:36AM    -----------------  EXAM: MR FOOT WAW IC LT  PROCEDURE DATE: 07/30/2021  INTERPRETATION: Clinical History / Reason for exam: Left fourth toe infection.  TECHNIQUE: Multiplanar multisequence MRI of the left forefoot pre and post administration of 10 cc Gadavist intravenous contrast was performed.  COMPARISON: Left foot radiographs July 29, 2021  FINDINGS:    There is no MRI evidence of osteomyelitis. There is neuropathic osteoarthropathy of the partially imaged tarsometatarsal joints with subchondral cysts/bone marrow edema across the first tarsometatarsal joint.    The flexor and extensor tendons are within normal limits. Second through fifth hammertoe deformities.    Intrinsic muscles of the foot demonstrate mild edema, consistent with neuropathy.    There is small first metatarsophalangeal joint effusion. There is edema plantar to the second and third metatarsophalangeal joints extending into the web spaces, consistent with capsulitis.    IMPRESSION:    No MRI evidence of osteomyelitis.    Second and third metatarsophalangeal joint capsulitis.    Neuropathic osteoarthropathy of the tarsometatarsal joints, partially imaged.    --- End of Report ---  NED UGALDE MD; Attending Radiologist  This document has been electronically signed. Jul 30 2021 1:40PM  -----------------      Assessment:   Diabetes  s/p Left 4th toe arthroplasty 7/30/21    Plan:  Chart reviewed and Patient evaluated. All Questions and Concerns Addressed and Answered  Discussed diagnosis and treatment with patient  Sx WCx and Sx BCx; pending  Post-op XR Imaging Left Foot; Pending  Lower Extremity Arterial Duplex B/L; See report above  MRI-Right Foot; See report above  Recommend ESR/CRP;   ID Consult appreciated  Follow Up w/ Wound Culture  Per Attending; Previous Wound Cx; Grew MRSA  WBAT in surgical shoe  Patient stable from Podiatry standpoint; plan to d/c with oral abx x 2 weeks   Patient can f/u w/ Dr. Lainez in clinic 1 week post d/c  Discussed Plan w/ Attending, Dr. Lainez    Podiatry

## 2021-07-31 NOTE — BRIEF OPERATIVE NOTE - COMMENTS
Closed Surgical Site; Stable  Patient Stable from Podiatry Standpoint;   WBAT w/ Surgical Shoe; Follow Up w/ ID for Oral ABx;

## 2021-07-31 NOTE — DISCHARGE NOTE PROVIDER - NSDCCPCAREPLAN_GEN_ALL_CORE_FT
PRINCIPAL DISCHARGE DIAGNOSIS  Diagnosis: Osteomyelitis of left foot, unspecified type  Assessment and Plan of Treatment: Osteomyelitis is an infection in a bone. Infections can reach a bone by traveling through the bloodstream or spreading from nearby tissue. Infections can also begin in the bone itself if an injury exposes the bone to germs.  Smokers and people with chronic health conditions, such as diabetes or kidney failure, are more at risk of developing osteomyelitis. People who have diabetes may develop osteomyelitis in their feet if they have foot ulcers.  Although once considered incurable, osteomyelitis can now be successfully treated. Most people need surgery to remove areas of the bone that have . After surgery, strong intravenous antibiotics are typically needed.  Your bones are normally resistant to infection, but this protection lessens as you get older. Other factors that can make your bones more vulnerable to osteomyelitis may include:  Recent injury or orthopedic surgery  Poorly controlled diabetes  Peripheral artery disease, often related to smoking  Sickle cell disease  If you've been told that you have an increased risk of infection, talk to your doctor about ways to prevent infections from  Reducing your risk of infection will also help your risk of developing osteomyelitis.  In general, take precautions to avoid cuts, scrapes and animal scratches or bites, which give germs easy access to your body. If you or your child has a minor injury, clean the area immediately and apply a clean bandage. Check wounds frequently for signs of infection.  The most common treatments for osteomyelitis are surgery to remove portions of bone that are infected or dead, followed by intravenous antibiotics given in the hospital.  Call 911 and return to the emergency room if you have chest pain, difficulty breathing, high fevers, worsening of your symptoms, feel unwell or have nausea and vomiting.

## 2021-07-31 NOTE — CHART NOTE - NSCHARTNOTEFT_GEN_A_CORE
PACU ANESTHESIA ADMISSION NOTE      Procedure: Arthroplasty of toe of left foot      Post op diagnosis:  Diabetic foot ulcer        ____  Intubated  TV:______       Rate: ______      FiO2: ______    __x__  Patent Airway    _x___  Full return of protective reflexes    ___x_  Full recovery from anesthesia / back to baseline status    Vitals:  T(C): 36.7 (07-30-21 @ 22:38), Max: 36.7 (07-30-21 @ 22:38)  HR: 57 (07-30-21 @ 23:58) (46 - 61)  BP: 126/69 (07-30-21 @ 23:58) (131/63 - 157/73)  RR: 14 (07-30-21 @ 23:58) (18 - 20)  SpO2: 98% (07-30-21 @ 23:58) (96% - 100%)    Mental Status:  __x__ Awake   ____x_ Alert   _____ Drowsy   _____ Sedated    Nausea/Vomiting:  __x__ NO  ______Yes,   See Post - Op Orders          Pain Scale (0-10):  ___5__    Treatment: ____ None    ___x_ See Post - Op/PCA Orders    Post - Operative Fluids:   ____ Oral   __x__ See Post - Op Orders    Plan: Discharge:   ____Home       __x___Floor     _____Critical Care    _____  Other:_________________    Comments: Transferred care to PACU RN; discharge to floor when criteria met.

## 2021-07-31 NOTE — DISCHARGE NOTE NURSING/CASE MANAGEMENT/SOCIAL WORK - PATIENT PORTAL LINK FT
You can access the FollowMyHealth Patient Portal offered by [facility] by registering at the following website: [email or web address]. By joining [facility]’s FollowMyHealth portal, you will also be able to view your health information using other applications (apps) compatible with our system.

## 2021-08-01 RX ORDER — CEPHALEXIN 500 MG
1 CAPSULE ORAL
Qty: 28 | Refills: 0
Start: 2021-08-01 | End: 2021-08-14

## 2021-08-03 LAB
CULTURE RESULTS: SIGNIFICANT CHANGE UP
SPECIMEN SOURCE: SIGNIFICANT CHANGE UP

## 2021-08-04 LAB — SURGICAL PATHOLOGY STUDY: SIGNIFICANT CHANGE UP

## 2021-08-09 PROBLEM — C73 MALIGNANT NEOPLASM OF THYROID GLAND: Chronic | Status: ACTIVE | Noted: 2021-07-29

## 2021-08-09 PROBLEM — I10 ESSENTIAL (PRIMARY) HYPERTENSION: Chronic | Status: ACTIVE | Noted: 2021-07-29

## 2021-08-09 PROBLEM — E11.9 TYPE 2 DIABETES MELLITUS WITHOUT COMPLICATIONS: Chronic | Status: ACTIVE | Noted: 2021-07-29

## 2021-08-10 ENCOUNTER — RX RENEWAL (OUTPATIENT)
Age: 71
End: 2021-08-10

## 2021-08-11 DIAGNOSIS — C73 MALIGNANT NEOPLASM OF THYROID GLAND: ICD-10-CM

## 2021-08-11 DIAGNOSIS — Z85.89 PERSONAL HISTORY OF MALIGNANT NEOPLASM OF OTHER ORGANS AND SYSTEMS: ICD-10-CM
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
## 2021-08-11 DIAGNOSIS — R74.01 ELEVATION OF LEVELS OF LIVER TRANSAMINASE LEVELS: ICD-10-CM

## 2021-08-11 DIAGNOSIS — Z92.21 PERSONAL HISTORY OF ANTINEOPLASTIC CHEMOTHERAPY: ICD-10-CM

## 2021-08-11 DIAGNOSIS — I10 ESSENTIAL (PRIMARY) HYPERTENSION: ICD-10-CM
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
## 2021-08-11 DIAGNOSIS — Z86.16 PERSONAL HISTORY OF COVID-19: ICD-10-CM

## 2021-08-11 DIAGNOSIS — M24.575 CONTRACTURE, LEFT FOOT: ICD-10-CM

## 2021-08-11 DIAGNOSIS — E11.51 TYPE 2 DIABETES MELLITUS WITH DIABETIC PERIPHERAL ANGIOPATHY WITHOUT GANGRENE: ICD-10-CM

## 2021-08-11 DIAGNOSIS — M79.676 PAIN IN UNSPECIFIED TOE(S): ICD-10-CM

## 2021-08-11 DIAGNOSIS — L97.529 NON-PRESSURE CHRONIC ULCER OF OTHER PART OF LEFT FOOT WITH UNSPECIFIED SEVERITY: ICD-10-CM

## 2021-08-11 DIAGNOSIS — Z92.3 PERSONAL HISTORY OF IRRADIATION: ICD-10-CM

## 2021-08-11 DIAGNOSIS — E78.5 HYPERLIPIDEMIA, UNSPECIFIED: ICD-10-CM

## 2021-08-11 DIAGNOSIS — M19.072 PRIMARY OSTEOARTHRITIS, LEFT ANKLE AND FOOT: ICD-10-CM

## 2021-08-11 DIAGNOSIS — Z87.891 PERSONAL HISTORY OF NICOTINE DEPENDENCE: ICD-10-CM

## 2021-08-11 DIAGNOSIS — M77.8 OTHER ENTHESOPATHIES, NOT ELSEWHERE CLASSIFIED: ICD-10-CM

## 2022-01-15 ENCOUNTER — APPOINTMENT (OUTPATIENT)
Dept: ENDOCRINOLOGY | Facility: CLINIC | Age: 72
End: 2022-01-15

## 2022-05-07 ENCOUNTER — APPOINTMENT (OUTPATIENT)
Dept: ENDOCRINOLOGY | Facility: CLINIC | Age: 72
End: 2022-05-07
Payer: MEDICARE

## 2022-05-07 VITALS
BODY MASS INDEX: 30.09 KG/M2 | HEART RATE: 68 BPM | WEIGHT: 227 LBS | HEIGHT: 73 IN | DIASTOLIC BLOOD PRESSURE: 78 MMHG | OXYGEN SATURATION: 98 % | SYSTOLIC BLOOD PRESSURE: 110 MMHG

## 2022-05-07 PROCEDURE — 99212 OFFICE O/P EST SF 10 MIN: CPT

## 2022-05-07 NOTE — PHYSICAL EXAM
[Alert] : alert [Well Nourished] : well nourished [No Acute Distress] : no acute distress [Well Developed] : well developed [Normal Sclera/Conjunctiva] : normal sclera/conjunctiva [EOMI] : extra ocular movement intact [No Proptosis] : no proptosis [Normal Oropharynx] : the oropharynx was normal [No Respiratory Distress] : no respiratory distress [No Accessory Muscle Use] : no accessory muscle use [Clear to Auscultation] : lungs were clear to auscultation bilaterally [Normal S1, S2] : normal S1 and S2 [Normal Rate] : heart rate was normal [Regular Rhythm] : with a regular rhythm [No Edema] : no peripheral edema [Pedal Pulses Normal] : the pedal pulses are present [Normal Bowel Sounds] : normal bowel sounds [Not Tender] : non-tender [Not Distended] : not distended [Soft] : abdomen soft [Normal Anterior Cervical Nodes] : no anterior cervical lymphadenopathy [No Spinal Tenderness] : no spinal tenderness [No Stigmata of Cushings Syndrome] : no stigmata of Cushings Syndrome [Spine Straight] : spine straight [Normal Gait] : normal gait [Normal Strength/Tone] : muscle strength and tone were normal [No Rash] : no rash [Normal Reflexes] : deep tendon reflexes were 2+ and symmetric [No Tremors] : no tremors [Oriented x3] : oriented to person, place, and time [Acanthosis Nigricans] : no acanthosis nigricans [de-identified] : heterogeneous

## 2022-05-07 NOTE — ASSESSMENT
[FreeTextEntry1] : I had a long discussion with patient regarding diabetes control including home readings. Goal HbA1c (< 7.0) was discussed and counseling provided regarding diet/exercise and complications of diabetes (renal and cardiac and neurologic as well as  and need for eye exams and examination of feet. Lipids and importance of BP control also reviewed. HbA1c currently at 7.0 from  7.6 and microalbumin 7 mcg/mg creatinine.\par Pt. has history of hypertension. Risks related to hypertension including cardiac, renal and vascular fully discussed. Diet discussed as well. Medications and importance of compliance reviewed.To decrease atenolo to 1/2 a day (25) \par Lipid levels were reviewed with patient and importance and function of LDL, HDL and triglycerides discussed. Methods to increase HDL (exercise, fish, beans, oat meal, legumes etc.) discussed with pt. in conjunction with measures to decrease LDL and triglycerides  including diet and exercise.LDL/HDL was up from 126/51 from  144/44. Triglycerides were  68 from 129. .Current regimen of treatment to continue. Risks/benefits  of statins were discussed in detail. Compliance with Zocor 5 mg discussed. \par \par

## 2022-07-28 ENCOUNTER — RX RENEWAL (OUTPATIENT)
Age: 72
End: 2022-07-28

## 2022-09-07 ENCOUNTER — RX RENEWAL (OUTPATIENT)
Age: 72
End: 2022-09-07

## 2022-10-09 ENCOUNTER — RX RENEWAL (OUTPATIENT)
Age: 72
End: 2022-10-09

## 2022-10-09 RX ORDER — METFORMIN HYDROCHLORIDE 500 MG/1
500 TABLET, COATED ORAL
Qty: 270 | Refills: 3 | Status: ACTIVE | COMMUNITY
Start: 2021-07-19 | End: 1900-01-01

## 2022-10-18 ENCOUNTER — APPOINTMENT (OUTPATIENT)
Dept: ORTHOPEDIC SURGERY | Facility: CLINIC | Age: 72
End: 2022-10-18

## 2022-10-18 PROCEDURE — 99203 OFFICE O/P NEW LOW 30 MIN: CPT

## 2022-10-18 PROCEDURE — 73502 X-RAY EXAM HIP UNI 2-3 VIEWS: CPT | Mod: LT

## 2022-10-19 NOTE — HISTORY OF PRESENT ILLNESS
[de-identified] : left  hip pain\par pain getting into and out of a car\par \par NAD\par left hip: \par no skin breakdown\par FF 0-110\par ER 40\par IR 20\par positive impingement\par ttp hip\par ttp greater troch\par NVI\par comp soft and nt\par \par Xray left hip: adv left hip oa\par \par Plan:\par went over findings\par explaiend the xray and advanced left hip oa\par spoke about tx options\par will cont cons tx for now\par pt script\par PM for possible injection\par fu in 3 months with [facility] to discuss lis

## 2022-11-19 ENCOUNTER — APPOINTMENT (OUTPATIENT)
Dept: ENDOCRINOLOGY | Facility: CLINIC | Age: 72
End: 2022-11-19

## 2022-11-19 VITALS
WEIGHT: 224 LBS | SYSTOLIC BLOOD PRESSURE: 115 MMHG | OXYGEN SATURATION: 97 % | BODY MASS INDEX: 29.55 KG/M2 | TEMPERATURE: 97.2 F | HEART RATE: 63 BPM | DIASTOLIC BLOOD PRESSURE: 75 MMHG

## 2022-11-19 DIAGNOSIS — Z00.00 ENCOUNTER FOR GENERAL ADULT MEDICAL EXAMINATION W/OUT ABNORMAL FINDINGS: ICD-10-CM

## 2022-11-19 PROCEDURE — 99213 OFFICE O/P EST LOW 20 MIN: CPT

## 2022-11-19 NOTE — PHYSICAL EXAM
[Alert] : alert [Well Nourished] : well nourished [No Acute Distress] : no acute distress [Well Developed] : well developed [Normal Sclera/Conjunctiva] : normal sclera/conjunctiva [EOMI] : extra ocular movement intact [No Proptosis] : no proptosis [Normal Oropharynx] : the oropharynx was normal [No Respiratory Distress] : no respiratory distress [No Accessory Muscle Use] : no accessory muscle use [Clear to Auscultation] : lungs were clear to auscultation bilaterally [Normal S1, S2] : normal S1 and S2 [Normal Rate] : heart rate was normal [Regular Rhythm] : with a regular rhythm [No Edema] : no peripheral edema [Pedal Pulses Normal] : the pedal pulses are present [Normal Bowel Sounds] : normal bowel sounds [Not Tender] : non-tender [Not Distended] : not distended [Soft] : abdomen soft [Normal Anterior Cervical Nodes] : no anterior cervical lymphadenopathy [No Spinal Tenderness] : no spinal tenderness [Spine Straight] : spine straight [No Stigmata of Cushings Syndrome] : no stigmata of Cushings Syndrome [Normal Gait] : normal gait [Normal Strength/Tone] : muscle strength and tone were normal [No Rash] : no rash [Normal Reflexes] : deep tendon reflexes were 2+ and symmetric [No Tremors] : no tremors [Oriented x3] : oriented to person, place, and time [Acanthosis Nigricans] : no acanthosis nigricans [de-identified] : heterogeneous

## 2022-11-19 NOTE — ASSESSMENT
[FreeTextEntry1] : I had a long discussion with patient regarding diabetes control including home readings. Goal HbA1c (< 7.0) was discussed and counseling provided regarding diet/exercise and complications of diabetes (renal and cardiac and neurologic as well as  and need for eye exams and examination of feet. Lipids and importance of BP control also reviewed. HbA1c currently at 7.1 from  7.0 & 7.6 and microalbumin < 0.2  mcg/mg creatinine.t now with diabetic neuropathy. \par Pt. has history of hypertension. Risks related to hypertension including cardiac, renal and vascular fully discussed. Diet discussed as well. Medications and importance of compliance reviewed.To decrease atenolol to 1/2 a day (25) \par Lipid levels were reviewed with patient and importance and function of LDL, HDL and triglycerides discussed. Methods to increase HDL (exercise, fish, beans, oat meal, legumes etc.) discussed with pt. in conjunction with measures to decrease LDL and triglycerides  including diet and exercise.LDL/HDL was up from 113/52 from 126/51. Triglycerides were 97.  .Current regimen of treatment to continue. Risks/benefits  of statins were discussed in detail. Compliance with Zocor 5 mg discussed. \par \par Vit B12 and Vit D were normal. \par TFTs were normal  \par

## 2022-12-30 ENCOUNTER — APPOINTMENT (OUTPATIENT)
Dept: ORTHOPEDIC SURGERY | Facility: CLINIC | Age: 72
End: 2022-12-30
Payer: MEDICARE

## 2022-12-30 PROCEDURE — 99213 OFFICE O/P EST LOW 20 MIN: CPT

## 2022-12-30 NOTE — HISTORY OF PRESENT ILLNESS
[de-identified] : 71 y/o gentleman for eval of left hip OA\par previously seen by aht\par pain at this point tolerable\par localized to groin and thigh\par worse with activity\par \par exam shows no shortening or contracture\par pain with rotation\par decreased IR and flexion of left hip\par \par prior xrays reviewed\par severe degenerative changes noted\par \par Plan is for non op management to include rest, activity modification, therapeutic exercises, stretching program, use of nsaids and analgesics, and weight control.\par f/u in 6 months.

## 2023-03-08 ENCOUNTER — EMERGENCY (EMERGENCY)
Facility: HOSPITAL | Age: 73
LOS: 0 days | Discharge: HOME | End: 2023-03-09
Attending: EMERGENCY MEDICINE
Payer: MEDICARE

## 2023-03-08 VITALS
OXYGEN SATURATION: 97 % | WEIGHT: 229.28 LBS | DIASTOLIC BLOOD PRESSURE: 77 MMHG | SYSTOLIC BLOOD PRESSURE: 157 MMHG | TEMPERATURE: 98 F | RESPIRATION RATE: 18 BRPM | HEART RATE: 63 BPM

## 2023-03-08 DIAGNOSIS — Z86.73 PERSONAL HISTORY OF TRANSIENT ISCHEMIC ATTACK (TIA), AND CEREBRAL INFARCTION WITHOUT RESIDUAL DEFICITS: ICD-10-CM

## 2023-03-08 DIAGNOSIS — Z20.822 CONTACT WITH AND (SUSPECTED) EXPOSURE TO COVID-19: ICD-10-CM

## 2023-03-08 DIAGNOSIS — I10 ESSENTIAL (PRIMARY) HYPERTENSION: ICD-10-CM

## 2023-03-08 DIAGNOSIS — E78.00 PURE HYPERCHOLESTEROLEMIA, UNSPECIFIED: ICD-10-CM

## 2023-03-08 DIAGNOSIS — Z85.850 PERSONAL HISTORY OF MALIGNANT NEOPLASM OF THYROID: ICD-10-CM

## 2023-03-08 DIAGNOSIS — Z85.20 PERSONAL HISTORY OF MALIGNANT NEOPLASM OF UNSPECIFIED RESPIRATORY ORGAN: ICD-10-CM

## 2023-03-08 DIAGNOSIS — Z79.84 LONG TERM (CURRENT) USE OF ORAL HYPOGLYCEMIC DRUGS: ICD-10-CM

## 2023-03-08 DIAGNOSIS — Z87.891 PERSONAL HISTORY OF NICOTINE DEPENDENCE: ICD-10-CM

## 2023-03-08 DIAGNOSIS — G45.9 TRANSIENT CEREBRAL ISCHEMIC ATTACK, UNSPECIFIED: ICD-10-CM

## 2023-03-08 DIAGNOSIS — R47.01 APHASIA: ICD-10-CM

## 2023-03-08 DIAGNOSIS — E11.9 TYPE 2 DIABETES MELLITUS WITHOUT COMPLICATIONS: ICD-10-CM

## 2023-03-08 LAB
ALBUMIN SERPL ELPH-MCNC: 4.4 G/DL — SIGNIFICANT CHANGE UP (ref 3.5–5.2)
ALP SERPL-CCNC: 81 U/L — SIGNIFICANT CHANGE UP (ref 30–115)
ALT FLD-CCNC: 16 U/L — SIGNIFICANT CHANGE UP (ref 0–41)
ANION GAP SERPL CALC-SCNC: 12 MMOL/L — SIGNIFICANT CHANGE UP (ref 7–14)
APTT BLD: 34.5 SEC — SIGNIFICANT CHANGE UP (ref 27–39.2)
AST SERPL-CCNC: 15 U/L — SIGNIFICANT CHANGE UP (ref 0–41)
BASE EXCESS BLDV CALC-SCNC: -1.1 MMOL/L — SIGNIFICANT CHANGE UP (ref -2–3)
BASOPHILS # BLD AUTO: 0 K/UL — SIGNIFICANT CHANGE UP (ref 0–0.2)
BASOPHILS NFR BLD AUTO: 0 % — SIGNIFICANT CHANGE UP (ref 0–1)
BILIRUB SERPL-MCNC: 0.4 MG/DL — SIGNIFICANT CHANGE UP (ref 0.2–1.2)
BUN SERPL-MCNC: 17 MG/DL — SIGNIFICANT CHANGE UP (ref 10–20)
CA-I SERPL-SCNC: 1.2 MMOL/L — SIGNIFICANT CHANGE UP (ref 1.15–1.33)
CALCIUM SERPL-MCNC: 9.2 MG/DL — SIGNIFICANT CHANGE UP (ref 8.4–10.5)
CHLORIDE SERPL-SCNC: 102 MMOL/L — SIGNIFICANT CHANGE UP (ref 98–110)
CHOLEST SERPL-MCNC: 220 MG/DL — HIGH
CO2 SERPL-SCNC: 21 MMOL/L — SIGNIFICANT CHANGE UP (ref 17–32)
CREAT SERPL-MCNC: 0.9 MG/DL — SIGNIFICANT CHANGE UP (ref 0.7–1.5)
EGFR: 92 ML/MIN/1.73M2 — SIGNIFICANT CHANGE UP
EOSINOPHIL # BLD AUTO: 0.07 K/UL — SIGNIFICANT CHANGE UP (ref 0–0.7)
EOSINOPHIL NFR BLD AUTO: 0.9 % — SIGNIFICANT CHANGE UP (ref 0–8)
FLUAV AG NPH QL: SIGNIFICANT CHANGE UP
FLUBV AG NPH QL: SIGNIFICANT CHANGE UP
GAS PNL BLDV: 134 MMOL/L — LOW (ref 136–145)
GAS PNL BLDV: SIGNIFICANT CHANGE UP
GAS PNL BLDV: SIGNIFICANT CHANGE UP
GIANT PLATELETS BLD QL SMEAR: PRESENT — SIGNIFICANT CHANGE UP
GLUCOSE SERPL-MCNC: 176 MG/DL — HIGH (ref 70–99)
HCO3 BLDV-SCNC: 23 MMOL/L — SIGNIFICANT CHANGE UP (ref 22–29)
HCT VFR BLD CALC: 48.3 % — SIGNIFICANT CHANGE UP (ref 42–52)
HCT VFR BLDA CALC: 50 % — SIGNIFICANT CHANGE UP (ref 39–51)
HDLC SERPL-MCNC: 45 MG/DL — SIGNIFICANT CHANGE UP
HGB BLD CALC-MCNC: 16.7 G/DL — SIGNIFICANT CHANGE UP (ref 12.6–17.4)
HGB BLD-MCNC: 16.6 G/DL — SIGNIFICANT CHANGE UP (ref 14–18)
INR BLD: 0.94 RATIO — SIGNIFICANT CHANGE UP (ref 0.65–1.3)
LACTATE BLDV-MCNC: 1.9 MMOL/L — SIGNIFICANT CHANGE UP (ref 0.5–2)
LIPID PNL WITH DIRECT LDL SERPL: 136 MG/DL — HIGH
LYMPHOCYTES # BLD AUTO: 0.73 K/UL — LOW (ref 1.2–3.4)
LYMPHOCYTES # BLD AUTO: 9.5 % — LOW (ref 20.5–51.1)
MANUAL SMEAR VERIFICATION: SIGNIFICANT CHANGE UP
MCHC RBC-ENTMCNC: 30.3 PG — SIGNIFICANT CHANGE UP (ref 27–31)
MCHC RBC-ENTMCNC: 34.4 G/DL — SIGNIFICANT CHANGE UP (ref 32–37)
MCV RBC AUTO: 88.3 FL — SIGNIFICANT CHANGE UP (ref 80–94)
MONOCYTES # BLD AUTO: 0.59 K/UL — SIGNIFICANT CHANGE UP (ref 0.1–0.6)
MONOCYTES NFR BLD AUTO: 7.7 % — SIGNIFICANT CHANGE UP (ref 1.7–9.3)
MYELOCYTES NFR BLD: 0.9 % — HIGH (ref 0–0)
NEUTROPHILS # BLD AUTO: 5.88 K/UL — SIGNIFICANT CHANGE UP (ref 1.4–6.5)
NEUTROPHILS NFR BLD AUTO: 76.7 % — HIGH (ref 42.2–75.2)
NON HDL CHOLESTEROL: 175 MG/DL — HIGH
PCO2 BLDV: 35 MMHG — LOW (ref 42–55)
PH BLDV: 7.42 — SIGNIFICANT CHANGE UP (ref 7.32–7.43)
PLAT MORPH BLD: NORMAL — SIGNIFICANT CHANGE UP
PLATELET # BLD AUTO: 286 K/UL — SIGNIFICANT CHANGE UP (ref 130–400)
PO2 BLDV: 60 MMHG — SIGNIFICANT CHANGE UP
POTASSIUM BLDV-SCNC: 4.9 MMOL/L — SIGNIFICANT CHANGE UP (ref 3.5–5.1)
POTASSIUM SERPL-MCNC: 4.4 MMOL/L — SIGNIFICANT CHANGE UP (ref 3.5–5)
POTASSIUM SERPL-SCNC: 4.4 MMOL/L — SIGNIFICANT CHANGE UP (ref 3.5–5)
PROT SERPL-MCNC: 6.6 G/DL — SIGNIFICANT CHANGE UP (ref 6–8)
PROTHROM AB SERPL-ACNC: 10.7 SEC — SIGNIFICANT CHANGE UP (ref 9.95–12.87)
RBC # BLD: 5.47 M/UL — SIGNIFICANT CHANGE UP (ref 4.7–6.1)
RBC # FLD: 12.7 % — SIGNIFICANT CHANGE UP (ref 11.5–14.5)
RBC BLD AUTO: NORMAL — SIGNIFICANT CHANGE UP
RSV RNA NPH QL NAA+NON-PROBE: SIGNIFICANT CHANGE UP
SAO2 % BLDV: 91.3 % — SIGNIFICANT CHANGE UP
SARS-COV-2 RNA SPEC QL NAA+PROBE: SIGNIFICANT CHANGE UP
SODIUM SERPL-SCNC: 135 MMOL/L — SIGNIFICANT CHANGE UP (ref 135–146)
TRIGL SERPL-MCNC: 195 MG/DL — HIGH
TROPONIN T SERPL-MCNC: <0.01 NG/ML — SIGNIFICANT CHANGE UP
VARIANT LYMPHS # BLD: 4.3 % — SIGNIFICANT CHANGE UP (ref 0–5)
WBC # BLD: 7.66 K/UL — SIGNIFICANT CHANGE UP (ref 4.8–10.8)
WBC # FLD AUTO: 7.66 K/UL — SIGNIFICANT CHANGE UP (ref 4.8–10.8)

## 2023-03-08 PROCEDURE — 93010 ELECTROCARDIOGRAM REPORT: CPT

## 2023-03-08 PROCEDURE — 71045 X-RAY EXAM CHEST 1 VIEW: CPT | Mod: 26

## 2023-03-08 PROCEDURE — 93005 ELECTROCARDIOGRAM TRACING: CPT

## 2023-03-08 PROCEDURE — 99285 EMERGENCY DEPT VISIT HI MDM: CPT | Mod: 25

## 2023-03-08 PROCEDURE — 70496 CT ANGIOGRAPHY HEAD: CPT | Mod: MA

## 2023-03-08 PROCEDURE — 70498 CT ANGIOGRAPHY NECK: CPT | Mod: MA

## 2023-03-08 PROCEDURE — 85025 COMPLETE CBC W/AUTO DIFF WBC: CPT

## 2023-03-08 PROCEDURE — 0042T: CPT | Mod: MA

## 2023-03-08 PROCEDURE — 99223 1ST HOSP IP/OBS HIGH 75: CPT

## 2023-03-08 PROCEDURE — 85014 HEMATOCRIT: CPT

## 2023-03-08 PROCEDURE — 70551 MRI BRAIN STEM W/O DYE: CPT | Mod: MA

## 2023-03-08 PROCEDURE — 84484 ASSAY OF TROPONIN QUANT: CPT

## 2023-03-08 PROCEDURE — 84132 ASSAY OF SERUM POTASSIUM: CPT

## 2023-03-08 PROCEDURE — 82330 ASSAY OF CALCIUM: CPT

## 2023-03-08 PROCEDURE — 84295 ASSAY OF SERUM SODIUM: CPT

## 2023-03-08 PROCEDURE — 83605 ASSAY OF LACTIC ACID: CPT

## 2023-03-08 PROCEDURE — 80053 COMPREHEN METABOLIC PANEL: CPT

## 2023-03-08 PROCEDURE — 70498 CT ANGIOGRAPHY NECK: CPT | Mod: 26,MA

## 2023-03-08 PROCEDURE — 82962 GLUCOSE BLOOD TEST: CPT

## 2023-03-08 PROCEDURE — G0378: CPT

## 2023-03-08 PROCEDURE — 71045 X-RAY EXAM CHEST 1 VIEW: CPT

## 2023-03-08 PROCEDURE — 80061 LIPID PANEL: CPT

## 2023-03-08 PROCEDURE — 82803 BLOOD GASES ANY COMBINATION: CPT

## 2023-03-08 PROCEDURE — 99283 EMERGENCY DEPT VISIT LOW MDM: CPT

## 2023-03-08 PROCEDURE — 0241U: CPT

## 2023-03-08 PROCEDURE — 70496 CT ANGIOGRAPHY HEAD: CPT | Mod: 26,MA

## 2023-03-08 PROCEDURE — 36415 COLL VENOUS BLD VENIPUNCTURE: CPT

## 2023-03-08 PROCEDURE — 85018 HEMOGLOBIN: CPT

## 2023-03-08 PROCEDURE — 70551 MRI BRAIN STEM W/O DYE: CPT | Mod: 26,MA

## 2023-03-08 PROCEDURE — 70450 CT HEAD/BRAIN W/O DYE: CPT | Mod: MA

## 2023-03-08 PROCEDURE — 85610 PROTHROMBIN TIME: CPT

## 2023-03-08 PROCEDURE — 85730 THROMBOPLASTIN TIME PARTIAL: CPT

## 2023-03-08 RX ORDER — CLOPIDOGREL BISULFATE 75 MG/1
300 TABLET, FILM COATED ORAL ONCE
Refills: 0 | Status: COMPLETED | OUTPATIENT
Start: 2023-03-08 | End: 2023-03-08

## 2023-03-08 RX ORDER — METFORMIN HYDROCHLORIDE 850 MG/1
1000 TABLET ORAL DAILY
Refills: 0 | Status: DISCONTINUED | OUTPATIENT
Start: 2023-03-08 | End: 2023-03-09

## 2023-03-08 RX ORDER — GLIMEPIRIDE 1 MG
2 TABLET ORAL DAILY
Refills: 0 | Status: DISCONTINUED | OUTPATIENT
Start: 2023-03-08 | End: 2023-03-09

## 2023-03-08 RX ORDER — ATENOLOL 25 MG/1
25 TABLET ORAL DAILY
Refills: 0 | Status: DISCONTINUED | OUTPATIENT
Start: 2023-03-08 | End: 2023-03-09

## 2023-03-08 RX ORDER — ASPIRIN/CALCIUM CARB/MAGNESIUM 324 MG
81 TABLET ORAL DAILY
Refills: 0 | Status: DISCONTINUED | OUTPATIENT
Start: 2023-03-08 | End: 2023-03-09

## 2023-03-08 RX ORDER — LISINOPRIL 2.5 MG/1
5 TABLET ORAL DAILY
Refills: 0 | Status: DISCONTINUED | OUTPATIENT
Start: 2023-03-08 | End: 2023-03-09

## 2023-03-08 RX ORDER — SIMVASTATIN 20 MG/1
5 TABLET, FILM COATED ORAL AT BEDTIME
Refills: 0 | Status: DISCONTINUED | OUTPATIENT
Start: 2023-03-08 | End: 2023-03-09

## 2023-03-08 RX ADMIN — Medication 81 MILLIGRAM(S): at 12:16

## 2023-03-08 RX ADMIN — CLOPIDOGREL BISULFATE 300 MILLIGRAM(S): 75 TABLET, FILM COATED ORAL at 12:17

## 2023-03-08 NOTE — ED PROVIDER NOTE - PHYSICAL EXAMINATION
PHYSICAL EXAM:    GENERAL: Alert, appears stated age, well appearing, non-toxic  SKIN: Warm, pink and dry.   HEAD: NC, AT  EYE: Normal lids/conjunctiva, PERRL, EOMI  ENT: Normal hearing, patent oropharynx   NECK: +supple. No meningismus, or JVD  Pulm: Bilateral BS, normal resp effort, no wheezes, stridor, or retractions  CV: RRR, no M/R/G, 2+and = radial pulses  Abd: soft, non-tender, non-distended, no rebound/guarding  Mskel: no erythema, cyanosis, edema. no calf tenderness  Neuro: AAOx3, no sensory/motor deficits, CN 2-12 intact. No speech slurring, pronator drift, facial asymmetry. normal finger-to-nose b/l. 5/5 strength throughout. normal gait.

## 2023-03-08 NOTE — CONSULT NOTE ADULT - CONSULT REASON
Difficulty with expression began yesterday/left sided h/a Difficulty with finding words began yesterday

## 2023-03-08 NOTE — CONSULT NOTE ADULT - ASSESSMENT
Impression:   72-year-old male ambulates independently, right handed, denies smoking w/hxo HTN, DM, thyroid cancer s/p chemo/radiation. Patient presents to ED for having difficulty word finding and expressing him self. Also reports around the same time last night began having some left-sided head pressure and left-sided chest pressure. Patient states last he spoke to his wife was at 6pm. LKW 6pm. As per patient when he awoke tried to speak to his wife and friends on phone and was struggling to find the right words. He also took two Advil for left sided headache. Patient states had similar symptoms about 20 years ago, found to have right carotid stenosis that resolved and did not require any treatment or surgery.  Stroke code activated and neurology consulted. Upon presentation patient is AOX4 denies change in vision, no dizziness, no weakness, numbness and no difficulty with swallowing, and no gait disturbance. Vitals: stable/afebrile. On exam: Patient does not have aphasia with no limitation of expression and no other neurological deficits. NIHSS 0. Imaging of CTH/CTA H/N and CTP shows no significant pathology. Patient is not a candidate for IV thrombolytics and IA intervention NIH 0, no LVO. Patient symptoms may be related to having TIA vs stroke. Patient has high risk of stroke given past medical history of diabetes and hypertension.     Suggestions:    MRI Brain w/o garland  TTE   Plavix load 300mg  Continue home ASA and statin  Maintain BP  Dispo: Obs  Case discussed with Dr. Carlton     Impression:   72-year-old male ambulates independently, right handed, denies smoking w/hxo HTN, DM, thyroid cancer s/p chemo/radiation. Patient presents to ED for having difficulty with word finding and expressing him self. Also reports around the same time last night began having some left-sided head pressure and left-sided chest pressure. Patient states last he spoke to his wife was at 6pm. LKW 6pm. As per patient when he awoke tried to speak to his wife and friends on phone and was struggling to find the right words. He also took two Advil for left sided headache. Patient states had similar symptoms about 20 years ago, found to have right carotid stenosis that resolved and did not require any treatment or surgery.  Stroke code activated and neurology consulted. Upon presentation patient is AOX4 denies change in vision, no dizziness, no weakness, numbness and no difficulty with swallowing, and no gait disturbance. Vitals: stable/afebrile. On exam: Patient does not have aphasia with no limitation of expression and no other neurological deficits. NIHSS 0. Imaging of CTH/CTA H/N and CTP shows no significant pathology. Patient is not a candidate for IV thrombolytics and IA intervention NIH 0, no LVO. Patient symptoms may be related to having TIA vs stroke. Patient has high risk of stroke given past medical history of diabetes and hypertension.     Suggestions:    MRI Brain w/o garland  TTE   Plavix load 300mg  Continue home ASA and statin  Maintain BP  Dispo: Obs  Case discussed with Dr. Carlton     Impression:   72-year-old male ambulates independently, right handed, (ex-smoker) hxo HTN, DM, throat cancer s/p chemo/radiation. Patient presents to ED for having difficulty with word finding and expressing him self. Also reports around the same time last night began having some left-sided head pressure and left-sided chest pressure. Patient states last he spoke to his wife was at 6pm. LKW 6pm. As per patient when he awoke tried to speak to his wife and friends on phone and was struggling to find the right words. He also took two Advil for left sided headache. Patient states had similar symptoms about 20 years ago, found to have right carotid stenosis that resolved and did not require any treatment or surgery.  Stroke code activated and neurology consulted. Upon presentation patient is AOX4 denies change in vision, no dizziness, no weakness, numbness and no difficulty with swallowing, and no gait disturbance. Vitals: stable/afebrile. On exam: Patient does not have aphasia with no limitation of expression and no other neurological deficits. NIHSS 0. Imaging of CTH shows no acute pathology, CT Perfusion shows no perfusion deficits and CTA H/N shows Mild atherosclerotic stenosis in the left CCA and cervical ICA (20%).  Patient is not a candidate for IV thrombolytics and IA intervention NIH 0, no LVO. Patient symptoms may be related to having TIA vs stroke. Patient has high risk of stroke given past medical history of diabetes and hypertension.     Suggestions:    MRI Brain w/o garland  TTE   Plavix load 300mg  Continue home ASA and statin  Permissive BP  Dispo: Obs  Case discussed with Dr. Carlton     Impression:   72-year-old male ambulates independently, right handed, (ex-smoker) hxo HTN, DM, throat cancer s/p chemo/radiation. Patient presents to ED for having difficulty with word finding and expressing him self. Also reports around the same time last night began having some left-sided head pressure and left-sided chest pressure. Patient states last he spoke to his wife was at 6pm. LKW 6pm. As per patient when he awoke tried to speak to his wife and friends on phone and was struggling to find the right words. He also took two Advil for left sided headache. Patient states had similar symptoms about 20 years ago, found to have right carotid stenosis that resolved and did not require any treatment or surgery.  Stroke code activated and neurology consulted. Upon presentation patient is AOX4 denies change in vision, no dizziness, no weakness, numbness and no difficulty with swallowing, and no gait disturbance. Vitals: stable/afebrile. On exam: Patient does not have aphasia with no limitation of expression and no other neurological deficits. NIHSS 0. Imaging of CTH shows no acute pathology, CT Perfusion shows no perfusion deficits and CTA H/N shows Mild atherosclerotic stenosis in the left CCA and cervical ICA (20%).  Patient is not a candidate for IV thrombolytics and IA intervention NIH 0, no LVO. Patient symptoms may be related to having TIA vs stroke. Patient has high risk of stroke given past medical history of diabetes and hypertension.     Suggestions:    MRI Brain w/o garland  TTE   Plavix load 300mg  Continue home ASA and statin  Permissive BP  Q 4 neuro checks  Dispo: Obs  Case discussed with Dr. Carlton

## 2023-03-08 NOTE — STROKE CODE NOTE - NIH STROKE SCALE: 9. BEST LANGUAGE, QM
[address]  [address]  Authorization for Surgical Operation and Procedure     Date:___________                                                                                                         Time:__________  I hereby authorize Dr. Valera, my physician and his/her assistants (if applicable), which may include medical students, residents, and/or fellows, to perform the following surgical operation/ procedure and administer such anesthesia as may be determined necessary by my physician:  Operation/Procedure name (s)central line placement  on Estephania Mota   2.   I recognize that during the surgical operation/procedure, unforeseen conditions may necessitate additional or different procedures than those listed above.  I, therefore, further authorize and request that the above-named surgeon, assistants, or designees perform such procedures as are, in their judgment, necessary and desirable.    3.   My surgeon/physician has discussed prior to my surgery the potential benefits, risks and side effects of this procedure; the likelihood of achieving goals; and potential problems that might occur during recuperation.  They also discussed reasonable alternatives to the procedure, including risks, benefits, and side effects related to the alternatives and risks related to not receiving this procedure.  I have had all my questions answered and I acknowledge that no guarantee has been made as to the result that may be obtained.    4.   Should the need arise during my operation/procedure, which includes change of level of care prior to discharge, I also consent to the administration of blood and/or blood products.  Further, I understand that despite careful testing and screening of blood or blood products by collecting agencies, I may still be subject to ill effects as a result of receiving a blood transfusion and/or blood products.  The following are some, but not all, of the potential  risks that can occur: fever and allergic reactions, hemolytic reactions, transmission of diseases such as Hepatitis, AIDS and Cytomegalovirus (CMV) and fluid overload.  In the event that I wish to have an autologous transfusion of my own blood, or a directed donor transfusion, I will discuss this with my physician.  Check only if Refusing Blood or Blood Products  I understand refusal of blood or blood products as deemed necessary by my physician may have serious consequences to my condition to include possible death. I hereby assume responsibility for my refusal and release the hospital, its personnel, and my physicians from any responsibility for the consequences of my refusal.          o  Refuse      5.   I authorize the use of any specimen, organs, tissues, body parts or foreign objects that may be removed from my body during the operation/procedure for diagnosis, research or teaching purposes and their subsequent disposal by hospital authorities.  I also authorize the release of specimen test results and/or written reports to my treating physician on the hospital medical staff or other referring or consulting physicians involved in my care, at the discretion of the Pathologist or my treating physician.    6.   I consent to the photographing or videotaping of the operations or procedures to be performed, including appropriate portions of my body for medical, scientific, or educational purposes, provided my identity is not revealed by the pictures or by descriptive texts accompanying them.  If the procedure has been photographed/videotaped, the surgeon will obtain the original picture, image, videotape or CD.  The hospital will not be responsible for storage, release or maintenance of the picture, image, tape or CD.    7.   I consent to the presence of a  or observers in the operating room as deemed necessary by my physician or their designees.    8.   I recognize that in the event my procedure  results in extended X-Ray/fluoroscopy time, I may develop a skin reaction.    9. If I have a Do Not Attempt Resuscitation (DNAR) order in place, that status will be suspended while in the operating room, procedural suite, and during the recovery period unless otherwise explicitly stated by me (or a person authorized to consent on my behalf). The surgeon or my attending physician will determine when the applicable recovery period ends for purposes of reinstating the DNAR order.  10. Patients having a sterilization procedure: I understand that if the procedure is successful the results will be permanent and it will therefore be impossible for me to inseminate, conceive, or bear children.  I also understand that the procedure is intended to result in sterility, although the result has not been guaranteed.   11. I acknowledge that my physician has explained sedation/analgesia administration to me including the risk and benefits I consent to the administration of sedation/analgesia as may be necessary or desirable in the judgment of my physician.    I CERTIFY THAT I HAVE READ AND FULLY UNDERSTAND THE ABOVE CONSENT TO OPERATION and/or OTHER PROCEDURE.    _________________________________________  __________________________________  Signature of Patient     Signature of Responsible Person         ___________________________________         Printed Name of Responsible Person           _________________________________                 Relationship to Patient  _________________________________________  ______________________________  Signature of Witness          Date  Time      Patient Name: Estephania Mota     : 1926                 Printed: 2024     Medical Record #: [id number]                     Page 1 [address]  [address]    Consent for Anesthesia    I, Estephania Mota agree to be cared for by an anesthesiologist, who is  specially trained to monitor me and give me medicine to put me to sleep or keep me comfortable during my procedure    I understand that my anesthesiologist is not an employee or agent of [facility] or [employer]. He or she works for "[employer].    As the patient asking for anesthesia services, I agree to:  Allow the anesthesiologist (anesthesia doctor) to give me medicine and do additional procedures as necessary. Some examples are: Starting or using an “IV” to give me medicine, fluids or blood during my procedure, and having a breathing tube placed to help me breathe when I’m asleep (intubation). In the event that my heart stops working properly, I understand that my anesthesiologist will make every effort to sustain my life, unless otherwise directed by [facility] Do Not Resuscitate documents.  Tell my anesthesia doctor before my procedure:  If I am pregnant.  The last time that I ate or drank.  All of the medicines I take (including prescriptions, herbal supplements, and pills I can buy without a prescription (including street drugs/illegal medications). Failure to inform my anesthesiologist about these medicines may increase my risk of anesthetic complications.  If I am allergic to anything or have had a reaction to anesthesia before.  I understand how the anesthesia medicine will help me (benefits).  I understand that with any type of anesthesia medicine there are risks:  The most common risks are: nausea, vomiting, sore throat, muscle soreness, damage to my eyes, mouth, or teeth (from breathing tube placement).  Rare risks include: remembering what happened during my procedure, allergic reactions to medications, injury to my airway, heart, lungs, vision, nerves, or muscles and in extremely rare instances death.  My doctor has explained to me other choices available to me for my care (alternatives).  Pregnant Patients (“epidural”):  I understand that the risks of having  an epidural (medicine given into my back to help control pain during labor), include itching, low blood pressure, difficulty urinating, headache or slowing of the baby’s heart. Very rare risks include infection, bleeding, seizure, irregular heart rhythms and nerve injury.  Regional Anesthesia (“spinal”, “epidural”, & “nerve blocks”):  I understand that rare but potential complications include headache, bleeding, infection, seizure, irregular heart rhythms, and nerve injury.    I can change my mind about having anesthesia services at any time before I get the medicine.    _____________________________________________________________________________  Patient (or Representative) Signature/Relationship to Patient  Date   Time    _____________________________________________________________________________   Name (if used)    Language/Organization   Time    _____________________________________________________________________________  Anesthesiologist Signature     Date   Time  I have discussed the procedure and information above with the patient (or patient’s representative) and answered their questions. The patient or their representative has agreed to have anesthesia services.    _____________________________________________________________________________  Witness        Date   Time  I have verified that the signature is that of the patient or patient’s representative, and that it was signed before the procedure  Patient Name: Estephania Mota     : 1926                 Printed: 2024     Medical Record #: [id number]                     Page 2 of 2   (0) No aphasia; normal

## 2023-03-08 NOTE — CONSULT NOTE ADULT - NS ATTEND AMEND GEN_ALL_CORE FT
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 States that he was speaking "gibberish" intermittently while talking to his spouse and friend on the phone. Now back to baseline. NIHSS 0. CT head without acute process. CTA head/neck with mild bilateral ICA stenosis, no ELVO. Agree with admission to OBS. MRI brain, TTE. PTA: ASA. Load with plavix 300mg x 1 and continue DAPT/statin. Permissive HTN x 24 hrs and q4 neurochecks. If MRI brain negative for acute ischemia, reasonable to obtain TTE as outpatient and f/u in stroke clinic in 2 weeks. 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 States that he was speaking "gibberish" intermittently while talking to his spouse and friend on the phone. Now back to baseline. NIHSS 0. CT head without acute process. CTA head/neck with mild bilateral ICA stenosis, no ELVO. Semiology concerning for possible TIA. Agree with admission to OBS. MRI brain, TTE. PTA: ASA. Load with plavix 300mg x 1 and continue DAPT/statin. Permissive HTN x 24 hrs and q4 neurochecks. If MRI brain negative for acute ischemia, reasonable to obtain TTE as outpatient and f/u in stroke clinic in 2 weeks.

## 2023-03-08 NOTE — ED PROVIDER NOTE - NS ED ATTENDING STATEMENT MOD
This was a shared visit with the MARIA DE JESUS. I reviewed and verified the documentation and independently performed the documented:

## 2023-03-08 NOTE — ED PROVIDER NOTE - OBJECTIVE STATEMENT
69 y/o 72-year-old male with PMH HTN, HLD, DM on metformin, remote history of thyroid cancer s/p RT/chemo presents with 4-hour episode of aphasia.   He relates that last night he went to bed--everything was normal.  He woke up as he does typically, several times throughout the night, walked the dog and at 5am attempted to speak with his wife and found that he could not speak the words he wanted to say, but instead was saying words which did not make sense. went to sleep, woke up at 9am and still had same issue.   resolved on arrival.   no palliating/provoking factors.  no fever, cp, sob, back pain, ab pain, other sxs.

## 2023-03-08 NOTE — ED PROVIDER NOTE - ATTENDING APP SHARED VISIT CONTRIBUTION OF CARE
72-year-old male with history of hypertension, diabetes, thyroid cancer, presents with aphasia. He states he went to sleep at 8 PM, once his last time he tried to speak. When he tried to talk to his wife at 6 AM today he was unable to produce words. It is significantly improved to this point he states, though not 100%. He states similar to past TIA he had 20 years ago. Also reports around the same time last night began having some left-sided head pressure and left-sided chest pressure. On exam, afebrile, hemodynamically stable, saturating well on room air, NAD, well appearing, sitting comfortably in chair, no WOB, speaking full sentences, head NCAT, pupils equal, EOMI, anicteric, no conjunctival injection, MMM, no JVD, RRR, nml S1/S2, no m/r/g, lungs CTAB, no w/r/r, abd soft, NT, ND, nml BS, no rebound or guarding, AAO, CN's 3-12 intact, motor 5/5 and sensation symmetric in all extremities, finger-to-nose normal, no dysarthria, very mild hesitancy of speech noted, [place] spontaneously, no leg cyanosis or edema, skin warm, well perfused, no rashes or hives. Code stroke called on arrival and comanaged with neurology team. 72-year-old male with history of hypertension, diabetes, thyroid cancer, presents with aphasia. He states he went to sleep at 8 PM, once his last time he tried to speak. When he tried to talk to his wife at 6 AM today he was unable to produce words. It is significantly improved to this point he states, though not 100%. He states similar to past TIA he had 20 years ago. Also reports around the same time last night began having some left-sided frontal head pressure and left-sided chest pressure. Denies all other symptoms including fever, cough, recent long distance travel, shortness of breath, vomiting, diarrhea, dizziness, vision changes. On exam, afebrile, hemodynamically stable, saturating well on room air, NAD, well appearing, sitting comfortably in chair, no WOB, speaking full sentences, head NCAT, pupils equal, EOMI, anicteric, no conjunctival injection, MMM, no JVD, RRR, nml S1/S2, no m/r/g, lungs CTAB, no w/r/r, abd soft, NT, ND, nml BS, no rebound or guarding, AAO, CN's 3-12 intact, motor 5/5 and sensation symmetric in all extremities, finger-to-nose normal, no dysarthria, very mild hesitancy of speech noted, [place] spontaneously, no leg cyanosis or edema, skin warm, well perfused, no rashes or hives. Code stroke called on arrival and comanaged with neurology team. Patient is out of the window for tPA and no LVO on CTA. Character and context and exam low suspicion for temporal arteritis to warrant further work-up for this. No fever or meningeal signs. Character low suspicion for dissection and no murmur or pulse asymmetry. Character low suspicion for PE and no tachycardia, hypoxia, or e/o DVT or acute risk factors for this. Character low suspicion for ACS and ECG/trop unremarkable. No e/o PNA, PTX, or fluid overload on exam or CXR. Given aspirin/Plavix by neurology recommendation and sent to obs for MRI work-up.

## 2023-03-08 NOTE — ED CDU PROVIDER INITIAL DAY NOTE - NSICDXPASTMEDICALHX_GEN_ALL_CORE_FT
PAST MEDICAL HISTORY:  DM2 (diabetes mellitus, type 2)     HTN (hypertension)     Thyroid cancer

## 2023-03-08 NOTE — ED CDU PROVIDER INITIAL DAY NOTE - OBJECTIVE STATEMENT
72-year-old male with history of hypertension, diabetes, thyroid cancer, presents with aphasia. He states he went to sleep at 8 PM, once his last time he tried to speak. When he tried to talk to his wife at 6 AM today he was unable to produce words. It is significantly improved to this point he states, though not 100%. He states similar to past TIA he had 20 years ago. Also reports around the same time last night began having some left-sided frontal head pressure and left-sided chest pressure. Denies all other symptoms including fever, cough, recent long distance travel, shortness of breath, vomiting, diarrhea, dizziness, vision changes.

## 2023-03-08 NOTE — ED CDU PROVIDER INITIAL DAY NOTE - ATTENDING APP SHARED VISIT CONTRIBUTION OF CARE
73 yo m hx TIA, htn, DM, squamous ca s/p treatment years ago  pt presents for eval of dysarthria. pt states he went to bed normal around 8pm.  pt states he walked his dog around 5am and when he returned home, he spoke to his wife. wife states speech was off. pt took a nap to see if sx would resolve. no numbness/weakness/ataxia/vertigo. pt states sx persisted when he woke up from nap so he came to the hospital. pt was stroke code. pt states sx have markedly improved and pt's speech is almost baseline. pt states he feels a little "off" still not does not have specific complaints.  no cp, sob, cough, fever, chills, ap, LE edema    vss  gen- NAD, aaox3  card-rrr  lungs-ctab, no wheezing or rhonchi  abd-sntnd, no guarding or rebound  neuro- full str/sensation, cn ii-xii grossly intact, normal coordination, speech clear w/ good word finding. pt speaking full sentences   Eyes- EOMI/PERRL

## 2023-03-08 NOTE — CONSULT NOTE ADULT - SUBJECTIVE AND OBJECTIVE BOX
72-year-old male with history of hypertension, diabetes, thyroid cancer, presents with aphasia. He states he went to sleep at 8 PM, once his last time he tried to speak. When he tried to talk to his wife at 6 AM today he was unable to produce words. It is significantly improved to this point he states, though not 100%. He states similar to past TIA he had 20 years ago. Also reports around the same time last night began having some left-sided head pressure and left-sided chest pressure. stroke code activated and neurology consulted for difficulty finding words and unable to speak. LKW 6pm. Patient denies visual disturbance, no weakness, no numbness, complains of expressive aphasia now improved upon presentation, no gait disturbance.  Vitals stable/afebrile. Labs: Neurology  Consult Note  03-08-23    Name:  VERN CHAVARRIA; 72y (1950)    Reason for Admission:     Chief Complaint: Difficulty finding words  HPI:    72-year-old male ambulates independently, right handed with history, denies smoking h/o HTN, DM, thyroid cancer s/p chemo/radiation, presents to ED for having difficulty word finding and expressing him self. Also reports around the same time last night began having some left-sided head pressure and left-sided chest pressure. Patient states last he spoke to his wife was at 6pm. LKW 6pm. As per patient when he awoke tried to speak to his wife and friends on phone and was struggling to find the right words. He also took two Advil for left sided headache. Patient states had similar symptoms about 20 years ago, found to have right carotid stenosis that resolved and did not require any treatment or surgery.  Stroke code activated and neurology consulted. Upon presentation patient is AOX4 denies change in vision, no dizziness, no weakness, numbness and no difficulty with swallowing, and no gait disturbance. Vitals: stable/afebrile.  PMHx:   HTN (hypertension)  DM2 (diabetes mellitus, type 2)  Thyroid cancer      PFHx:   No pertinent family history in first degree relatives      PSuHx:   No significant past surgical history      PSoHx:     Medications:  MEDICATIONS  (STANDING):  aspirin  chewable 81 milliGRAM(s) Oral daily    MEDICATIONS  (PRN):    Vitals:  T(C): 36.6 (03-08-23 @ 09:37), Max: 36.6 (03-08-23 @ 09:37)  HR: 63 (03-08-23 @ 09:37) (63 - 63)  BP: 157/77 (03-08-23 @ 09:37) (157/77 - 157/77)  RR: 18 (03-08-23 @ 09:37) (18 - 18)  SpO2: 97% (03-08-23 @ 09:37) (97% - 97%)    Labs:                        16.6   7.66  )-----------( 286      ( 08 Mar 2023 09:55 )             48.3     03-08    135  |  102  |  17  ----------------------------<  176<H>  4.4   |  21  |  0.9    Ca    9.2      08 Mar 2023 09:55    TPro  6.6  /  Alb  4.4  /  TBili  0.4  /  DBili  x   /  AST  15  /  ALT  16  /  AlkPhos  81  03-08    CAPILLARY BLOOD GLUCOSE  159 (08 Mar 2023 10:52)    POCT Blood Glucose.: 159 mg/dL (08 Mar 2023 09:40)    LIVER FUNCTIONS - ( 08 Mar 2023 09:55 )  Alb: 4.4 g/dL / Pro: 6.6 g/dL / ALK PHOS: 81 U/L / ALT: 16 U/L / AST: 15 U/L / GGT: x             PT/INR - ( 08 Mar 2023 09:55 )   PT: 10.70 sec;   INR: 0.94 ratio         PTT - ( 08 Mar 2023 09:55 )  PTT:34.5 sec      PHYSICAL EXAMINATION:  General: Well-developed, well nourished, in no acute distress.  Eyes: Conjunctiva and sclera clear.  Neurologic:  - Mental Status:  Alert, awake, oriented to person, place, and time; Speech is fluent with intact naming, repetition, and comprehension; Good overall fund of knowledge-   Cranial Nerves II-XII:    II:  Visual fields are full to confrontation; Pupils are equal, round, and reactive to light; Visual acuity is 20/20 bilaterally; Fundoscopic exam is normal with sharp discs.  III, IV, VI:  Extraocular movements are intact without nystagmus.  V:  Facial sensation is intact in the V1-V3 distribution bilaterally.  VII:  Face is symmetric with normal eye closure and smile  VIII:  Hearing is intact to finger rub.  IX, X:  Uvula is midline and soft palate rises symmetrically  XI:  Head turning and shoulder shrug are intact.  XII:  Tongue protudes in the midline.  - Motor:  Strength is 5/5 throughout.  There is no pronator drift.  Normal muscle bulk and tone throughout.  - Reflexes:  2+ and symmetric at the biceps, triceps, brachioradialis, knees, and ankles.  Plantar responses flexor.  - Sensory:  Intact throughout to light touch.  - Coordination:  Finger-nose-finger and heel-knee-shin intact without dysmetria.  - Gait:   Normal steps, base, arm swing, and turning.  Heel and toe walking are normal.  Radiology:    Radiology:     < from: CT Brain Stroke Protocol (03.08.23 @ 09:53) >  MPRESSION:    No acute intracranial pathology.    < end of copied text >  < from: CT Brain Perfusion Maps Stroke (03.08.23 @ 09:59) >  IMPRESSION:    CT PERFUSION:  No perfusion deficits to suggest areas of completed infarction or at risk   territory.    CTA HEAD/NECK:  No large vessel occlusion, aneurysm, or vascular malformation.    Mild atherosclerotic stenosis in the left CCA and cervical ICA (20%).    < end of copied text >         Neurology  Consult Note  03-08-23    Name:  VERN CHAVARRIA; 72y (1950)    Reason for Admission:     Chief Complaint: Difficulty finding words  HPI:    72-year-old male ambulates independently, right handed with history, denies smoking h/o HTN, DM, thyroid cancer s/p chemo/radiation, presents to ED for having difficulty with word finding and expressing him self. Also reports around the same time last night began having some left-sided head pressure and left-sided chest pressure. Patient states last he spoke to his wife was at 6pm. LKW 6pm. As per patient when he awoke tried to speak to his wife and friends on phone and was struggling to find the right words. He also took two Advil for left sided headache. Patient states had similar symptoms about 20 years ago, found to have right carotid stenosis that resolved and did not require any treatment or surgery.  Stroke code activated and neurology consulted. Upon presentation patient is AOX4 denies change in vision, no dizziness, no weakness, numbness and no difficulty with swallowing, and no gait disturbance. Vitals: stable/afebrile.  PMHx:   HTN (hypertension)  DM2 (diabetes mellitus, type 2)  Thyroid cancer      PFHx:   No pertinent family history in first degree relatives      PSuHx:   No significant past surgical history      PSoHx:     Medications:  MEDICATIONS  (STANDING):  aspirin  chewable 81 milliGRAM(s) Oral daily    MEDICATIONS  (PRN):    Vitals:  T(C): 36.6 (03-08-23 @ 09:37), Max: 36.6 (03-08-23 @ 09:37)  HR: 63 (03-08-23 @ 09:37) (63 - 63)  BP: 157/77 (03-08-23 @ 09:37) (157/77 - 157/77)  RR: 18 (03-08-23 @ 09:37) (18 - 18)  SpO2: 97% (03-08-23 @ 09:37) (97% - 97%)    Labs:                        16.6   7.66  )-----------( 286      ( 08 Mar 2023 09:55 )             48.3     03-08    135  |  102  |  17  ----------------------------<  176<H>  4.4   |  21  |  0.9    Ca    9.2      08 Mar 2023 09:55    TPro  6.6  /  Alb  4.4  /  TBili  0.4  /  DBili  x   /  AST  15  /  ALT  16  /  AlkPhos  81  03-08    CAPILLARY BLOOD GLUCOSE  159 (08 Mar 2023 10:52)    POCT Blood Glucose.: 159 mg/dL (08 Mar 2023 09:40)    LIVER FUNCTIONS - ( 08 Mar 2023 09:55 )  Alb: 4.4 g/dL / Pro: 6.6 g/dL / ALK PHOS: 81 U/L / ALT: 16 U/L / AST: 15 U/L / GGT: x             PT/INR - ( 08 Mar 2023 09:55 )   PT: 10.70 sec;   INR: 0.94 ratio         PTT - ( 08 Mar 2023 09:55 )  PTT:34.5 sec      PHYSICAL EXAMINATION:  General: Well-developed, well nourished, in no acute distress.  Eyes: Conjunctiva and sclera clear.  Neurologic:  - Mental Status:  Alert, awake, oriented to person, place, and time; Speech is fluent with intact naming, repetition, and comprehension; Good overall fund of knowledge-   Cranial Nerves II-XII:    II:  Visual fields are full to confrontation; Pupils are equal, round, and reactive to light; Visual acuity is 20/20 bilaterally; Fundoscopic exam is normal with sharp discs.  III, IV, VI:  Extraocular movements are intact without nystagmus.  V:  Facial sensation is intact in the V1-V3 distribution bilaterally.  VII:  Face is symmetric with normal eye closure and smile  VIII:  Hearing is intact to finger rub.  IX, X:  Uvula is midline and soft palate rises symmetrically  XI:  Head turning and shoulder shrug are intact.  XII:  Tongue protudes in the midline.  - Motor:  Strength is 5/5 throughout.  There is no pronator drift.  Normal muscle bulk and tone throughout.  - Reflexes:  2+ and symmetric at the biceps, triceps, brachioradialis, knees, and ankles.  Plantar responses flexor.  - Sensory:  Intact throughout to light touch.  - Coordination:  Finger-nose-finger and heel-knee-shin intact without dysmetria.  - Gait:   Normal steps, base, arm swing, and turning.  Heel and toe walking are normal.  Radiology:    Radiology:     < from: CT Brain Stroke Protocol (03.08.23 @ 09:53) >  MPRESSION:    No acute intracranial pathology.    < end of copied text >  < from: CT Brain Perfusion Maps Stroke (03.08.23 @ 09:59) >  IMPRESSION:    CT PERFUSION:  No perfusion deficits to suggest areas of completed infarction or at risk   territory.    CTA HEAD/NECK:  No large vessel occlusion, aneurysm, or vascular malformation.    Mild atherosclerotic stenosis in the left CCA and cervical ICA (20%).    < end of copied text >         Neurology  Consult Note  03-08-23    Name:  VERN CHAVARRIA; 72y (1950)    Reason for Admission:     Chief Complaint: Difficulty finding words  HPI:    72-year-old male ambulates independently, right handed with history, denies smoking h/o HTN, DM, thyroid cancer s/p chemo/radiation, presents to ED for having difficulty with word finding and expressing him self. Also reports around the same time last night began having some left-sided head pressure and left-sided chest pressure. Patient states last he spoke to his wife was at 6pm. LKW 6pm. As per patient when he awoke tried to speak to his wife and friends on phone and was struggling to find the right words. He also took two Advil for left sided headache. Patient states had similar symptoms about 20 years ago, found to have right carotid stenosis that resolved and did not require any treatment or surgery.  Stroke code activated and neurology consulted. Upon presentation patient is AOX4 denies change in vision, no dizziness, no weakness, numbness and no difficulty with swallowing, and no gait disturbance. Vitals: stable/afebrile.  PMHx:   HTN (hypertension)  DM2 (diabetes mellitus, type 2)  Thyroid cancer      PFHx:   No pertinent family history in first degree relatives      PSuHx:   No significant past surgical history      PSoHx:     Medications:  MEDICATIONS  (STANDING):  aspirin  chewable 81 milliGRAM(s) Oral daily    MEDICATIONS  (PRN):    Vitals:  T(C): 36.6 (03-08-23 @ 09:37), Max: 36.6 (03-08-23 @ 09:37)  HR: 63 (03-08-23 @ 09:37) (63 - 63)  BP: 157/77 (03-08-23 @ 09:37) (157/77 - 157/77)  RR: 18 (03-08-23 @ 09:37) (18 - 18)  SpO2: 97% (03-08-23 @ 09:37) (97% - 97%)    Labs:                        16.6   7.66  )-----------( 286      ( 08 Mar 2023 09:55 )             48.3     03-08    135  |  102  |  17  ----------------------------<  176<H>  4.4   |  21  |  0.9    Ca    9.2      08 Mar 2023 09:55    TPro  6.6  /  Alb  4.4  /  TBili  0.4  /  DBili  x   /  AST  15  /  ALT  16  /  AlkPhos  81  03-08    CAPILLARY BLOOD GLUCOSE  159 (08 Mar 2023 10:52)    POCT Blood Glucose.: 159 mg/dL (08 Mar 2023 09:40)    LIVER FUNCTIONS - ( 08 Mar 2023 09:55 )  Alb: 4.4 g/dL / Pro: 6.6 g/dL / ALK PHOS: 81 U/L / ALT: 16 U/L / AST: 15 U/L / GGT: x             PT/INR - ( 08 Mar 2023 09:55 )   PT: 10.70 sec;   INR: 0.94 ratio         PTT - ( 08 Mar 2023 09:55 )  PTT:34.5 sec      PHYSICAL EXAMINATION:  General: Well-developed, well nourished, in no acute distress.  Eyes: Conjunctiva and sclera clear.  Neurologic:  - Mental Status:  Alert, awake, oriented to person, place, and time; Speech is fluent with intact naming, repetition, and comprehension; Good overall fund of knowledge-   Cranial Nerves II-XII:    II:  Visual fields are full to confrontation; Pupils are equal, round, and reactive to light  III, IV, VI:  Extraocular movements are intact without nystagmus.  V:  Facial sensation is intact in the V1-V3 distribution bilaterally.  VII:  Face is symmetric with normal eye closure and smile  VIII:  Hearing is intact to finger rub.  IX, X:  Uvula is midline and soft palate rises symmetrically  XI:  Head turning and shoulder shrug are intact.  XII:  Tongue protudes in the midline.  - Motor:  Strength is 5/5 throughout.  There is no pronator drift.  Normal muscle bulk and tone throughout.  - Reflexes:  2+ and symmetric at the biceps, triceps, brachioradialis, knees, and ankles.  Plantar responses flexor.  - Sensory:  Intact throughout to light touch.  - Coordination:  Finger-nose-finger and heel-knee-shin intact without dysmetria.  - Gait:   Normal steps, base, arm swing, and turning.  Heel and toe walking are normal.  Radiology:    Radiology:     < from: CT Brain Stroke Protocol (03.08.23 @ 09:53) >  MPRESSION:    No acute intracranial pathology.    < end of copied text >  < from: CT Brain Perfusion Maps Stroke (03.08.23 @ 09:59) >  IMPRESSION:    CT PERFUSION:  No perfusion deficits to suggest areas of completed infarction or at risk   territory.    CTA HEAD/NECK:  No large vessel occlusion, aneurysm, or vascular malformation.    Mild atherosclerotic stenosis in the left CCA and cervical ICA (20%).    < end of copied text >         Neurology  Consult Note  03-08-23    Name:  VERN CHAVARRIA; 72y (1950)    Reason for Admission:     Chief Complaint: Difficulty finding words  HPI:    72-year-old male ambulates independently, right handed, (ex smoker x40 years) h/o HTN, DM, throat cancer s/p chemo/radiation, presents to ED for having difficulty with word finding and expressing him self. Also reports around the same time last night began having some left-sided head pressure and left-sided chest pressure. Patient states last he spoke to his wife was at 6pm. LKW 6pm. As per patient when he awoke tried to speak to his wife and friends on phone and was struggling to find the right words. He also took two Advil for left sided headache. Patient states had similar symptoms about 20 years ago, found to have right carotid stenosis that resolved and did not require any treatment or surgery.  Stroke code activated and neurology consulted. Upon presentation patient is AOX4 denies change in vision, no dizziness, no weakness, numbness and no difficulty with swallowing, and no gait disturbance. Vitals: stable/afebrile.  PMHx:   HTN (hypertension)  DM2 (diabetes mellitus, type 2)  Thyroid cancer      PFHx:   No pertinent family history in first degree relatives      PSuHx:   No significant past surgical history      PSoHx:     Medications:  MEDICATIONS  (STANDING):  aspirin  chewable 81 milliGRAM(s) Oral daily    MEDICATIONS  (PRN):    Vitals:  T(C): 36.6 (03-08-23 @ 09:37), Max: 36.6 (03-08-23 @ 09:37)  HR: 63 (03-08-23 @ 09:37) (63 - 63)  BP: 157/77 (03-08-23 @ 09:37) (157/77 - 157/77)  RR: 18 (03-08-23 @ 09:37) (18 - 18)  SpO2: 97% (03-08-23 @ 09:37) (97% - 97%)    Labs:                        16.6   7.66  )-----------( 286      ( 08 Mar 2023 09:55 )             48.3     03-08    135  |  102  |  17  ----------------------------<  176<H>  4.4   |  21  |  0.9    Ca    9.2      08 Mar 2023 09:55    TPro  6.6  /  Alb  4.4  /  TBili  0.4  /  DBili  x   /  AST  15  /  ALT  16  /  AlkPhos  81  03-08    CAPILLARY BLOOD GLUCOSE  159 (08 Mar 2023 10:52)    POCT Blood Glucose.: 159 mg/dL (08 Mar 2023 09:40)    LIVER FUNCTIONS - ( 08 Mar 2023 09:55 )  Alb: 4.4 g/dL / Pro: 6.6 g/dL / ALK PHOS: 81 U/L / ALT: 16 U/L / AST: 15 U/L / GGT: x             PT/INR - ( 08 Mar 2023 09:55 )   PT: 10.70 sec;   INR: 0.94 ratio         PTT - ( 08 Mar 2023 09:55 )  PTT:34.5 sec      PHYSICAL EXAMINATION:  General: Well-developed, well nourished, in no acute distress.  Eyes: Conjunctiva and sclera clear.  Neurologic:  - Mental Status:  Alert, awake, oriented to person, place, and time; Speech is fluent with intact naming, repetition, and comprehension; Good overall fund of knowledge-   Cranial Nerves II-XII:    II:  Visual fields are full to confrontation; Pupils are equal, round, and reactive to light  III, IV, VI:  Extraocular movements are intact without nystagmus.  V:  Facial sensation is intact in the V1-V3 distribution bilaterally.  VII:  Face is symmetric with normal eye closure and smile  VIII:  Hearing is intact to finger rub.  IX, X:  Uvula is midline and soft palate rises symmetrically  XI:  Head turning and shoulder shrug are intact.  XII:  Tongue protudes in the midline.  - Motor:  Strength is 5/5 throughout.  There is no pronator drift.  Normal muscle bulk and tone throughout.  - Reflexes:  2+ and symmetric at the biceps, triceps, brachioradialis, knees, and ankles.  Plantar responses flexor.  - Sensory:  Intact throughout to light touch.  - Coordination:  Finger-nose-finger and heel-knee-shin intact without dysmetria.  - Gait:   Normal steps, base, arm swing, and turning.  Heel and toe walking are normal.  Radiology:    Radiology:     < from: CT Brain Stroke Protocol (03.08.23 @ 09:53) >  MPRESSION:    No acute intracranial pathology.    < end of copied text >  < from: CT Brain Perfusion Maps Stroke (03.08.23 @ 09:59) >  IMPRESSION:    CT PERFUSION:  No perfusion deficits to suggest areas of completed infarction or at risk   territory.    CTA HEAD/NECK:  No large vessel occlusion, aneurysm, or vascular malformation.    Mild atherosclerotic stenosis in the left CCA and cervical ICA (20%).    < end of copied text >

## 2023-03-08 NOTE — ED CDU PROVIDER INITIAL DAY NOTE - CLINICAL SUMMARY MEDICAL DECISION MAKING FREE TEXT BOX
brain imaging w/o acute findings  sx improving  pt not thrombolytic candidate, no LVO  pt seen by neuro who rec obs for MRI brain, echo, serial neuro exams

## 2023-03-09 VITALS
RESPIRATION RATE: 18 BRPM | HEART RATE: 80 BPM | SYSTOLIC BLOOD PRESSURE: 129 MMHG | OXYGEN SATURATION: 96 % | TEMPERATURE: 97 F | DIASTOLIC BLOOD PRESSURE: 67 MMHG

## 2023-03-09 PROCEDURE — 99238 HOSP IP/OBS DSCHRG MGMT 30/<: CPT

## 2023-03-09 RX ORDER — ATORVASTATIN CALCIUM 80 MG/1
1 TABLET, FILM COATED ORAL
Qty: 21 | Refills: 0
Start: 2023-03-09 | End: 2023-03-29

## 2023-03-09 RX ORDER — ASPIRIN/CALCIUM CARB/MAGNESIUM 324 MG
1 TABLET ORAL
Qty: 21 | Refills: 0
Start: 2023-03-09 | End: 2023-03-29

## 2023-03-09 RX ORDER — CLOPIDOGREL BISULFATE 75 MG/1
1 TABLET, FILM COATED ORAL
Qty: 21 | Refills: 0
Start: 2023-03-09 | End: 2023-03-29

## 2023-03-09 RX ADMIN — LISINOPRIL 5 MILLIGRAM(S): 2.5 TABLET ORAL at 06:00

## 2023-03-09 RX ADMIN — SIMVASTATIN 5 MILLIGRAM(S): 20 TABLET, FILM COATED ORAL at 04:20

## 2023-03-09 RX ADMIN — ATENOLOL 25 MILLIGRAM(S): 25 TABLET ORAL at 06:00

## 2023-03-09 NOTE — ED CDU PROVIDER SUBSEQUENT DAY NOTE - ATTENDING CONTRIBUTION TO CARE
72-year-old man, history of hypertension, diabetes, thyroid cancer had a brief episode of aphasia this morning that resolved almost completely prior to arrival.  Had a TIA in the past which presented similarly.  No other symptoms.  ED work-up with labs and imaging was unremarkable.  Patient was evaluated by neuro, recommended MRI.  On my exam patient is well-appearing, with nonfocal neuro exam.  Plan is for telemetry, MRI, neurochecks.

## 2023-03-09 NOTE — ED CDU PROVIDER SUBSEQUENT DAY NOTE - CONSIDERATION OF ADMISSION OBSERVATION
Pt required monitoring, neuro checks, advanced neuroimaging, neurology consultation Consideration of Admission/Observation

## 2023-03-09 NOTE — ED CDU PROVIDER DISPOSITION NOTE - CLINICAL COURSE
Patient came in with aphasia, code stroke was activated, workup including MRI was normal. Symptoms resolved. Neuro cleared patient for discharge to follow up outpatient, and to take DAPT and statin.

## 2023-03-09 NOTE — ED CDU PROVIDER DISPOSITION NOTE - CARE PROVIDER_API CALL
Christopher Carlton)  Neurology  [address] [place] [address]  Phone: [phone]  Fax: [phone]  Established Patient  Scheduled Appointment: 03/30/2023

## 2023-03-09 NOTE — CHART NOTE - NSCHARTNOTEFT_GEN_A_CORE
MRI brain negative for acute ischemia. Etiology possible TIA. Continue DAPT x 21 days, then ASA monotherapy. Continue high intensity statin. Ok to obtain TTE as outpatient. F/u in stroke clinic in 2-3 weeks.

## 2023-03-09 NOTE — ED CDU PROVIDER DISPOSITION NOTE - PATIENT PORTAL LINK FT
You can access the FollowMyHealth Patient Portal offered by [facility] by registering at the following website: [email or web address]. By joining [employer]’s FollowMyHealth portal, you will also be able to view your health information using other applications (apps) compatible with our system.

## 2023-03-09 NOTE — ED CDU PROVIDER DISPOSITION NOTE - NSFOLLOWUPINSTRUCTIONS_ED_ALL_ED_FT
Please continue aspirin 81mg and plavix 70mg and atorvastatin 80mg daily for 21 days, Please follow up at stroke clinic in 2 to 3 weeks per neurology recommendation. Please get echocardiogram test done outpatient.    It is important to make sure that you continue to take all medications as prescribed to help prevent having another stroke event. If you have been recommended to do Physical or Speech therapy, it is important that you participate to the best of ability. If you were advised to follow up with Neurology and/or Cardiology, be sure to do so. If you feel that you are having similar symptoms again, be sure to seek medical attention immediately as it is possible to have another stroke even if you just had one.    Our Emergency Department Referral Coordinators will be reaching out to you in the next 24-48 hours from 9:00am to 5:00pm with a follow up appointment. Please expect a phone call from the hospital in that time frame. If you do not receive a call or if you have any questions or concerns, you can reach them at   [phone]

## 2023-03-09 NOTE — ED CDU PROVIDER SUBSEQUENT DAY NOTE - CLINICAL SUMMARY MEDICAL DECISION MAKING FREE TEXT BOX
72-year-old man, history of hypertension, diabetes, thyroid cancer had a brief episode of aphasia this morning that resolved almost completely prior to arrival.  Had a TIA in the past which presented similarly.  No other symptoms.  ED work-up with labs and imaging was unremarkable.  Patient was evaluated by neuro, recommended MRI.  On my exam patient is well-appearing, with nonfocal neuro exam.  He continued to improved overnight, MRI was negative for acute ischemia. Pt reassessed by neuro, rec DAPT with neuro f/u in stroke clinic. Pt feeling at baseline and comfortable with discharge. Return precautions discussed.

## 2023-03-09 NOTE — ED CDU PROVIDER SUBSEQUENT DAY NOTE - PHYSICAL EXAMINATION
CONSTITUTIONAL: well-developed, well-nourished, in no acute distress  SKIN: warm, dry  HEAD: Normocephalic  EYES: no conjunctival erythema  ENT: no nasal discharge, airway clear  NECK: full ROM  CARD: regular rate and rhythm  RESP: normal respiratory effort, no wheezes, rales or rhonchi  ABD: soft, non-distended, non-tender  EXT: moving all extremities spontaneously  NEURO: alert and oriented x3, strength and sensation 5/5 b/l UE and LE, CN2-12 intact, finger to nose normal b/l, able to ambulate without difficulty  PSYCH: cooperative, appropriate

## 2023-03-09 NOTE — ED CDU PROVIDER SUBSEQUENT DAY NOTE - HISTORY
72-year-old male with PMH HTN, HLD, DM on metformin, remote history of thyroid cancer s/p RT/chemo presents with 4-hour episode of aphasia. Night prior to arrival he was at his baseline.  He woke up as he does typically, several times throughout the night, walked the dog and at 5am attempted to speak with his wife and found that he could not speak the words he wanted to say, but instead was saying words which did not make sense. went to sleep, woke up at 9am and still had same issue. resolved on arrival. no palliating/provoking factors. no fever, cp, sob, back pain, ab pain, other sxs. MRI done no signs of stroke, likely TIA per neurology attending, DAPT and statin for 21 days, f/u outpatient neuro clinic.

## 2023-03-27 ENCOUNTER — APPOINTMENT (OUTPATIENT)
Dept: NEUROLOGY | Facility: CLINIC | Age: 73
End: 2023-03-27
Payer: MEDICARE

## 2023-03-27 ENCOUNTER — NON-APPOINTMENT (OUTPATIENT)
Age: 73
End: 2023-03-27

## 2023-03-27 VITALS
TEMPERATURE: 97.9 F | OXYGEN SATURATION: 96 % | HEIGHT: 73 IN | SYSTOLIC BLOOD PRESSURE: 139 MMHG | BODY MASS INDEX: 30.48 KG/M2 | WEIGHT: 230 LBS | DIASTOLIC BLOOD PRESSURE: 76 MMHG | HEART RATE: 53 BPM

## 2023-03-27 DIAGNOSIS — G45.9 TRANSIENT CEREBRAL ISCHEMIC ATTACK, UNSPECIFIED: ICD-10-CM

## 2023-03-27 PROCEDURE — 99215 OFFICE O/P EST HI 40 MIN: CPT

## 2023-04-04 NOTE — ASSESSMENT
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 States that he was speaking "gibberish" intermittently while talking to his spouse and friend on the phone. Says inpatient his sx IMPROVED. Reported NIHSS 0 in ED. CT head without acute process. CTA head/neck with MILD b/l ICA stenosis (greater on LEFT), no ELVO. Dc dx was TIA. Will treat at TIA given age and RF, but will do REEG to r/o mimic given similar transient episode 25 years ago (ask next time how long use of Ambien?). \par \par PLAN: \par -DAPT x21 days completed, now on ASA 81 monotherapy  \par -?microhemorrhages on SWAN? -- BP today 139/76 -- not reported, will have stroke attending review \par -A1c goal <7 \par -Can work w/ PCP to increased statin to reach goal near 70 for LDL if treating for TIA; c/w statin\par -TTE w/ [facility] w/ Cardiologist Dr. Mo--- 30 day MCOT if there is concern for arrhythmia\par -REEG given a/w similar episode 25 years ago\par -BP goal <130/80\par -F/u in 3-4 mo \par -Instructed patient to call 911 or report to ED if any acute neurological changes occur, such as having severe, sudden, unusual headache or BEFAST symptoms

## 2023-04-04 NOTE — HISTORY OF PRESENT ILLNESS
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 States that he was speaking "gibberish" intermittently while talking to his spouse and friend on the phone. Says inpatient his sx IMPROVED. Reported NIHSS 0 in ED. CT head without acute process. CTA head/neck with MILD b/l ICA stenosis (greater on LEFT), no ELVO. Semiology concerning for possible TIA. \par \par MRI H w/o acute/chronic findings  \par \par PTA: ASA 81, Simvastatin 5, HTN at baseline controlled and measures at home \par \par ?microhemorrhages on SWAN? \par \par , A1c 7.8 \par \par Sz RF: no fmhx, no relevant birth hx, no hx CNS infx, no drug use, no hx HT\par Hypercoag RF: None\par FMHx: no MI/stroke <55, mom  after ICH + Trauma on Plavix \par ------------\par -Still with some work stumbling (like a stutter, I saw it); knows what he wants to say, but not coming out right, can self correct. Says this occurred at baseline. \par -25 yrs ago --- Dark room in zoo, [place] disoriented, denies presyncope, was NOT talking coherently also, was told ?thromboembolic \par -Has neuropathy of finger tips and toes--says 2/2 DM \par -Goes to gym daily  \par -Denies palpitations, denies Afib in family\par \par \par

## 2023-04-04 NOTE — CONSULT LETTER
[Dear  ___] : Dear  [unfilled], [Courtesy Letter:] : I had the pleasure of seeing your patient, [unfilled], in my office today. [Consult Closing:] : Thank you very much for allowing me to participate in the care of this patient.  If you have any questions, please do not hesitate to contact me. [Sincerely,] : Sincerely, [FreeTextEntry3] : Safia Tijerina PA-C

## 2023-04-04 NOTE — PHYSICAL EXAM
[FreeTextEntry1] : Focal neurological exam:\par \par MS: Awake, alert, oriented to person, place, situation and time. Normal affect. Follows commands. \par \par Language: Speech is clear, fluent with good repetition & comprehension. No dysarthria. \par \par CNs 2 - 12 intact. EOMI no nystagmus, no diplopia. VFF. No facial asymmetry b/l, full eye closure strength b/l. Hearing grossly normal. Head turning & shoulder shrug intact b/l. Tongue midline, normal movements, no atrophy.\par \par Motor: Normal muscle bulk & tone. No noticeable tremor or seizure. No pronator drift. Muscle strength of b/l UE and b/l LE 5/5. \par \par Reflexes: DTR of biceps, knee and ankle normal \par \par Sensation: Intact to LT, temperature and vibration b/l throughout\par \par Cortical: No extinction\par \par Coordination: No dysmetria to FTN.\par \par Gait: No postural instability. Normal stance and tandem gait.\par \par NIHSS 0\par mRS 0\par \par \par \par

## 2023-04-10 ENCOUNTER — APPOINTMENT (OUTPATIENT)
Dept: NEUROLOGY | Facility: CLINIC | Age: 73
End: 2023-04-10
Payer: MEDICARE

## 2023-04-10 PROCEDURE — 95816 EEG AWAKE AND DROWSY: CPT

## 2023-05-08 ENCOUNTER — APPOINTMENT (OUTPATIENT)
Dept: CARDIOLOGY | Facility: CLINIC | Age: 73
End: 2023-05-08
Payer: MEDICARE

## 2023-05-08 PROCEDURE — 93306 TTE W/DOPPLER COMPLETE: CPT

## 2023-05-30 ENCOUNTER — NON-APPOINTMENT (OUTPATIENT)
Age: 73
End: 2023-05-30

## 2023-06-10 ENCOUNTER — APPOINTMENT (OUTPATIENT)
Dept: ENDOCRINOLOGY | Facility: CLINIC | Age: 73
End: 2023-06-10
Payer: MEDICARE

## 2023-06-10 VITALS
DIASTOLIC BLOOD PRESSURE: 70 MMHG | SYSTOLIC BLOOD PRESSURE: 120 MMHG | HEIGHT: 73 IN | HEART RATE: 60 BPM | WEIGHT: 220 LBS | OXYGEN SATURATION: 98 % | TEMPERATURE: 97.4 F | BODY MASS INDEX: 29.16 KG/M2

## 2023-06-10 PROCEDURE — 99213 OFFICE O/P EST LOW 20 MIN: CPT

## 2023-06-10 RX ORDER — ATORVASTATIN CALCIUM 80 MG/1
80 TABLET, FILM COATED ORAL DAILY
Qty: 90 | Refills: 3 | Status: ACTIVE | COMMUNITY
Start: 2023-06-10

## 2023-06-10 RX ORDER — ATENOLOL 25 MG/1
25 TABLET ORAL
Qty: 90 | Refills: 3 | Status: ACTIVE | COMMUNITY
Start: 1900-01-01 | End: 1900-01-01

## 2023-06-10 RX ORDER — SIMVASTATIN 5 MG/1
5 TABLET, FILM COATED ORAL
Qty: 90 | Refills: 3 | Status: DISCONTINUED | COMMUNITY
Start: 2022-07-28 | End: 2023-06-10

## 2023-06-24 RX ORDER — BLOOD-GLUCOSE,RECEIVER,CONT
EACH MISCELLANEOUS
Qty: 1 | Refills: 0 | Status: ACTIVE | COMMUNITY
Start: 1900-01-01 | End: 1900-01-01

## 2023-06-30 ENCOUNTER — APPOINTMENT (OUTPATIENT)
Dept: ORTHOPEDIC SURGERY | Facility: CLINIC | Age: 73
End: 2023-06-30

## 2023-07-10 ENCOUNTER — APPOINTMENT (OUTPATIENT)
Dept: NEUROLOGY | Facility: CLINIC | Age: 73
End: 2023-07-10

## 2023-07-11 RX ORDER — BLOOD SUGAR DIAGNOSTIC
STRIP MISCELLANEOUS
Refills: 0 | Status: DISCONTINUED | COMMUNITY
End: 2023-07-11

## 2023-07-11 NOTE — ASSESSMENT
[FreeTextEntry1] : Pt with TIA at [facility]> I had a long discussion with patient regarding diabetes control including home readings. Goal HbA1c (< 7.0) was discussed and counseling provided regarding diet/exercise and complications of diabetes (renal and cardiac and neurologic as well as  and need for eye exams and examination of feet. Lipids and importance of BP control also reviewed. HbA1c currently at 7.6 from  7.1 ,  7.0 & 7.6 and microalbumin  17 mcg/mg creatinine,\par Pt  now with diabetic neuropathy. \par Pt. has history of hypertension. Risks related to hypertension including cardiac, renal and vascular fully discussed. Diet discussed as well. Medications and importance of compliance reviewed. We decreased atenolol to 1/2 a day (25) \par Lipid levels were reviewed with patient and importance and function of LDL, HDL and triglycerides discussed. Methods to increase HDL (exercise, fish, beans, oat meal, legumes etc.) discussed with pt. in conjunction with measures to decrease LDL and triglycerides  including diet and exercise.LDL/HDL was  75/45 from 113/52 . Triglycerides were 81 from 97.  .Current regimen of treatment to continue. Risks/benefits  of statins were discussed in detail. Compliance with Zocor 5 mg discussed. \par \par The previous Vit B12 and Vit D were normal. \par TFTs were normal  \par Pt. has multinodular goiter. We have reviewed ultrasound 4/23/22  and  discussed the potential for hyperfunction and need to monitor for changes that could be consistent with neoplasm. Will continue to monitor U/S for stability and function. \par

## 2023-07-11 NOTE — PHYSICAL EXAM
[Alert] : alert [Well Nourished] : well nourished [No Acute Distress] : no acute distress [Well Developed] : well developed [Normal Sclera/Conjunctiva] : normal sclera/conjunctiva [EOMI] : extra ocular movement intact [No Proptosis] : no proptosis [Normal Oropharynx] : the oropharynx was normal [No Respiratory Distress] : no respiratory distress [No Accessory Muscle Use] : no accessory muscle use [Normal S1, S2] : normal S1 and S2 [Clear to Auscultation] : lungs were clear to auscultation bilaterally [Normal Rate] : heart rate was normal [Regular Rhythm] : with a regular rhythm [No Edema] : no peripheral edema [Pedal Pulses Normal] : the pedal pulses are present [Normal Bowel Sounds] : normal bowel sounds [Not Tender] : non-tender [Not Distended] : not distended [Soft] : abdomen soft [Normal Anterior Cervical Nodes] : no anterior cervical lymphadenopathy [No Spinal Tenderness] : no spinal tenderness [Spine Straight] : spine straight [No Stigmata of Cushings Syndrome] : no stigmata of Cushings Syndrome [Normal Gait] : normal gait [Normal Strength/Tone] : muscle strength and tone were normal [No Rash] : no rash [Normal Reflexes] : deep tendon reflexes were 2+ and symmetric [No Tremors] : no tremors [Oriented x3] : oriented to person, place, and time [Acanthosis Nigricans] : no acanthosis nigricans [de-identified] : heterogeneous

## 2023-08-28 ENCOUNTER — RX RENEWAL (OUTPATIENT)
Age: 73
End: 2023-08-28

## 2023-08-28 RX ORDER — GLIMEPIRIDE 2 MG/1
2 TABLET ORAL DAILY
Qty: 90 | Refills: 3 | Status: ACTIVE | COMMUNITY
Start: 2022-09-07 | End: 1900-01-01

## 2023-08-29 ENCOUNTER — APPOINTMENT (OUTPATIENT)
Dept: ORTHOPEDIC SURGERY | Facility: CLINIC | Age: 73
End: 2023-08-29
Payer: MEDICARE

## 2023-08-29 DIAGNOSIS — M16.12 UNILATERAL PRIMARY OSTEOARTHRITIS, LEFT HIP: ICD-10-CM

## 2023-08-29 PROCEDURE — 99214 OFFICE O/P EST MOD 30 MIN: CPT

## 2023-08-29 RX ORDER — NAPROXEN 500 MG/1
500 TABLET ORAL
Qty: 20 | Refills: 0 | Status: ACTIVE | COMMUNITY
Start: 2023-08-29 | End: 1900-01-01

## 2023-08-29 NOTE — DISCUSSION/SUMMARY
[de-identified] : Discussed prior x-rays in detail patient showing moderate to advanced osteoarthritis of left hip.  MRI ordered for further evaluation of osteoarthritis versus internal derangement.  Call after MRI discuss results in detail.  Discussed treatment option detail including rest, ice that she can range of motion/, physical therapy, cortisone injection, possible total hip placement surgery.  Patient will follow-up after MRI.  Naproxen sent to patient's pharmacy, discussed side effects in detail.  Discussed with patient to discuss with primary physician before taking medication.  Call if any questions or concerns.  Patient understands agrees to plan.

## 2023-08-29 NOTE — HISTORY OF PRESENT ILLNESS
[de-identified] : Patient is a 73-year-old male here for reevaluation of left hip osteoarthritis.  Patient states at this point his pain comes and goes and localizes to the groin/thigh area, worsens with activity.

## 2023-08-29 NOTE — PHYSICAL EXAM
[Left] : left hip [2+] : posterior tibialis pulse: 2+ [] : no greater trochanteric tenderness [FreeTextEntry9] : Mild decreased range of motion with pain to groin [de-identified] : Good strength

## 2023-09-11 ENCOUNTER — RX RENEWAL (OUTPATIENT)
Age: 73
End: 2023-09-11

## 2023-09-11 RX ORDER — BLOOD SUGAR DIAGNOSTIC
STRIP MISCELLANEOUS 3 TIMES DAILY
Qty: 300 | Refills: 3 | Status: ACTIVE | COMMUNITY
Start: 2022-06-09 | End: 1900-01-01

## 2023-09-18 ENCOUNTER — APPOINTMENT (OUTPATIENT)
Dept: MRI IMAGING | Facility: CLINIC | Age: 73
End: 2023-09-18
Payer: MEDICARE

## 2023-09-18 PROCEDURE — 73721 MRI JNT OF LWR EXTRE W/O DYE: CPT | Mod: LT

## 2023-10-04 ENCOUNTER — APPOINTMENT (OUTPATIENT)
Dept: UROLOGY | Facility: CLINIC | Age: 73
End: 2023-10-04

## 2023-10-10 ENCOUNTER — APPOINTMENT (OUTPATIENT)
Dept: ORTHOPEDIC SURGERY | Facility: CLINIC | Age: 73
End: 2023-10-10

## 2024-01-26 PROBLEM — E78.00 HYPERCHOLESTEREMIA: Status: ACTIVE | Noted: 2020-01-25

## 2024-01-26 PROBLEM — E11.9 TYPE 2 DIABETES MELLITUS: Status: ACTIVE | Noted: 2019-06-28

## 2024-01-26 PROBLEM — E04.1 LEFT THYROID NODULE: Status: ACTIVE | Noted: 2019-06-28

## 2024-01-26 NOTE — ASSESSMENT
[FreeTextEntry1] : Pt with TIA at [facility].  I had a long discussion with patient regarding diabetes control including home readings. Goal HbA1c (< 7.0) was discussed, and counseling provided regarding diet/exercise and complications of diabetes (renal and cardiac and neurologic as well as and need for eye exams and examination of feet. Lipids and importance of BP control also reviewed. HbA1c currently at 8.1 from 7.6, 7.1, 7.0 & 7.6 and previous microalbumin 17 mcg/mg creatinine, Pt now with diabetic neuropathy.  Pt. has history of hypertension. Risks related to hypertension including cardiac, renal and vascular fully discussed. Diet discussed as well. Medications and importance of compliance reviewed. We decreased atenolol to 1/2 a day (25)  Lipid levels were reviewed with patient and importance and function of LDL, HDL and triglycerides discussed. Methods to increase HDL (exercise, fish, beans, oatmeal, legumes etc.) discussed with pt. in conjunction with measures to decrease LDL and triglycerides including diet and exercise.LDL/HDL was 69/51 from 75/45 & 113/52. Triglycerides were 59 from 81 & 97. Current regimen of treatment to continue. Risks/benefits of statins were discussed in detail. Compliance with Atorvastatin 80 mg.  mg discussed. The previous Vit B12 and Vit D were normal. TFTs were normal. Pt. has multinodular goiter. We have reviewed ultrasound 4/23/22 and discussed the potential for hyperfunction and need to monitor for changes that could be consistent with neoplasm. Will continue to monitor U/S for stability and function.

## 2024-01-26 NOTE — PHYSICAL EXAM
[Alert] : alert [Well Nourished] : well nourished [No Acute Distress] : no acute distress [Well Developed] : well developed [EOMI] : extra ocular movement intact [Normal Sclera/Conjunctiva] : normal sclera/conjunctiva [No Proptosis] : no proptosis [Normal Oropharynx] : the oropharynx was normal [No Respiratory Distress] : no respiratory distress [No Accessory Muscle Use] : no accessory muscle use [Clear to Auscultation] : lungs were clear to auscultation bilaterally [Normal S1, S2] : normal S1 and S2 [Regular Rhythm] : with a regular rhythm [Normal Rate] : heart rate was normal [No Edema] : no peripheral edema [Pedal Pulses Normal] : the pedal pulses are present [Not Distended] : not distended [Not Tender] : non-tender [Normal Bowel Sounds] : normal bowel sounds [Soft] : abdomen soft [Normal Anterior Cervical Nodes] : no anterior cervical lymphadenopathy [No Spinal Tenderness] : no spinal tenderness [Spine Straight] : spine straight [No Stigmata of Cushings Syndrome] : no stigmata of Cushings Syndrome [Normal Gait] : normal gait [Normal Strength/Tone] : muscle strength and tone were normal [No Rash] : no rash [Acanthosis Nigricans] : no acanthosis nigricans [No Tremors] : no tremors [Normal Reflexes] : deep tendon reflexes were 2+ and symmetric [Oriented x3] : oriented to person, place, and time [de-identified] : heterogeneous

## 2024-01-27 ENCOUNTER — APPOINTMENT (OUTPATIENT)
Dept: ENDOCRINOLOGY | Facility: CLINIC | Age: 74
End: 2024-01-27
Payer: MEDICARE

## 2024-01-27 VITALS
SYSTOLIC BLOOD PRESSURE: 134 MMHG | DIASTOLIC BLOOD PRESSURE: 78 MMHG | WEIGHT: 230 LBS | HEART RATE: 65 BPM | BODY MASS INDEX: 30.48 KG/M2 | OXYGEN SATURATION: 98 % | HEIGHT: 73 IN

## 2024-01-27 DIAGNOSIS — E78.00 PURE HYPERCHOLESTEROLEMIA, UNSPECIFIED: ICD-10-CM

## 2024-01-27 DIAGNOSIS — E04.1 NONTOXIC SINGLE THYROID NODULE: ICD-10-CM

## 2024-01-27 DIAGNOSIS — E11.9 TYPE 2 DIABETES MELLITUS W/OUT COMPLICATIONS: ICD-10-CM

## 2024-01-27 PROCEDURE — 99213 OFFICE O/P EST LOW 20 MIN: CPT

## 2024-01-27 RX ORDER — DULAGLUTIDE 0.75 MG/.5ML
0.75 INJECTION, SOLUTION SUBCUTANEOUS
Qty: 13 | Refills: 3 | Status: ACTIVE | COMMUNITY
Start: 2024-01-27 | End: 1900-01-01

## 2024-01-27 RX ORDER — LISINOPRIL 10 MG/1
10 TABLET ORAL
Qty: 90 | Refills: 3 | Status: ACTIVE | COMMUNITY

## 2024-01-27 RX ORDER — BLOOD-GLUCOSE SENSOR
EACH MISCELLANEOUS
Qty: 9 | Refills: 3 | Status: ACTIVE | COMMUNITY
Start: 1900-01-01 | End: 1900-01-01

## 2024-04-29 NOTE — ED ADULT NURSE NOTE - NS ED NURSE DISCH DISPOSITION
Detail Level: Detailed
Quality 111:Pneumonia Vaccination Status For Older Adults: Patient did not receive any pneumococcal conjugate or polysaccharide vaccine on or after their 60th birthday and before the end of the measurement period
Quality 130: Documentation Of Current Medications In The Medical Record: Current Medications Documented
Quality 226: Preventive Care And Screening: Tobacco Use: Screening And Cessation Intervention: Patient screened for tobacco use and is an ex/non-smoker
Quality 110: Preventive Care And Screening: Influenza Immunization: Influenza Immunization previously received during influenza season
Quality 431: Preventive Care And Screening: Unhealthy Alcohol Use - Screening: Patient not identified as an unhealthy alcohol user when screened for unhealthy alcohol use using a systematic screening method
Discharged

## 2024-05-16 ENCOUNTER — APPOINTMENT (OUTPATIENT)
Dept: PAIN MANAGEMENT | Facility: CLINIC | Age: 74
End: 2024-05-16
Payer: MEDICARE

## 2024-05-16 VITALS — WEIGHT: 230 LBS | HEIGHT: 73 IN | BODY MASS INDEX: 30.48 KG/M2

## 2024-05-16 DIAGNOSIS — Z85.46 PERSONAL HISTORY OF MALIGNANT NEOPLASM OF PROSTATE: ICD-10-CM

## 2024-05-16 DIAGNOSIS — M54.59 OTHER LOW BACK PAIN: ICD-10-CM

## 2024-05-16 PROCEDURE — 99204 OFFICE O/P NEW MOD 45 MIN: CPT

## 2024-05-16 RX ORDER — MELOXICAM 15 MG/1
15 TABLET ORAL
Qty: 30 | Refills: 0 | Status: ACTIVE | COMMUNITY
Start: 2024-05-16 | End: 1900-01-01

## 2024-05-16 NOTE — PHYSICAL EXAM
[de-identified] : Lumbar Spine Exam: Inspection: erythema (-) ecchymosis (-) rashes (-) alignment: no scoliosis  Palpation: Midline lumbar tenderness:           L (-)    (-) paraspinal tenderness:                  L (-) ; R (-) sciatic nerve tenderness :             L (-) ; R (-) SI joint tenderness:                        L (-) ; R (-) GTB tenderness:                            L (-);  R (-)  Limited ROM 2/2 to pain with lumbar flexion and extension w/ rotation to R and L side  Strength: 5/5 throughout all muscle groups of the lower extremity                                     Right       Left    Hip Flexion:                (5/5)       (5/5) Quadriceps:               (5/5)       (5/5) Hamstrings:                (5/5)       (5/5) Ankle Dorsiflexion:     (5/5)       (5/5) EHL:                           (5/5)       (5/5) Ankle Plantarflexion:  (5/5)       (5/5)  Special Tests: SLR:                           R (-) ; L (-) Facet loading:            R (-) ; L (-) BEVERLY test:               R (-) ; L (-)  Neurologic: Light touch intact throughout LE  Reflexes normal and symmetric   Gait: mildly antalgic gait ambulates w/o assistive device   Left hip: Inspection: no evidence of atrophy, effusion, rash     Palpation: + tenderness over anterior hip, adductors, PSIS, gluteus, GTB    Stability: no gross instability bilaterally    Alignment: normal    ROM:   Painful reduced ROM especially flexion, external and internal rotation.      Special tests:  BEVERLY + (groin pain)    Stability:  No gross instability bilaterally

## 2024-05-16 NOTE — HISTORY OF PRESENT ILLNESS
[FreeTextEntry1] : Mr. Thomas is a 73-year-old male presenting to establish care for his hip pain. He has had this pain, on the left side, for many years after many years of wear and tear. He has been seen by Dr. Josiah Pandey who did not recommend surgical correction. He advised him to take Motrin which was not beneficial. He was also seen by another Orthopedics on [facility] and underwent a series of 3 steroid injections which only provided him with temporary relief. He was then seen by Dr. Luis who sent the patient for an MRI of the left hip which unfortunately showed prostate cancer. Due to this, no treatment has been done yet for the hip. He has gained a lot of weight due to hormonal therapy however he is not eating and does not have an appetite. He is currently undergoing the proper treatment for his cancer including radiation. He does state that his cancer metastasis to his lower back. He has undergone many PET scans however he is unsure how this was confirmed. He continues with ongoing pain around the left lower back/buttock area and hip. He has pain with walking or doing any sort of weight bearing activity. Pain is present daily. Pain limits his ADLs.

## 2024-05-16 NOTE — DATA REVIEWED
[FreeTextEntry1] : MRI of the left hip taken on 9- showed focally advanced osteoarthritis in the weight bearing left hip associated with 1 cm paralabral ganglion cyst suggesting labral tear along with superior posterior labral margin. No osteonecrosis or cortical collapse is appreciated. Mild degenerative arthritis in the right hip. Multilevel degenerative disc disease and mild curvature in the lower lumbar spine.

## 2024-05-16 NOTE — DISCUSSION/SUMMARY
[de-identified] : A discussion regarding available pain management treatment options occurred with the patient. These included interventional, rehabilitative, pharmacological, and alternative modalities. We will proceed with the following:   Imaging: - Patient has prostate and is currently undergoing treatment. He is complaining of left sided lower back pain. To rule out any metastasis pathology, I would like to proceed with an MRI of the lumbar spine.   Medications: 1. Ordered Meloxicam 15 mg daily prn.  I advised Mr. Thomas that the NSAID should be taken with food.  In addition while taking the prescribed NSAID, no over the counter or other NSAIDs should be used, such as ibuprofen (Motrin or Advil) or naproxen (Aleve) as this can cause stomach upset or other side effects.  If needed for fever or breakthrough pain Tylenol can be used.   - Follow up in 2-3 weeks to review imaging.   I Susannah Copeland attest that this documentation has been prepared under the direction and in the presence of provider Dr. Nino Potter.  The documentation recorded by the scribe in my presence, accurately reflects the service I personally performed, and the decisions made by me with my edits as appropriate.   Nino Potter, DO

## 2024-05-19 ENCOUNTER — APPOINTMENT (OUTPATIENT)
Dept: MRI IMAGING | Facility: CLINIC | Age: 74
End: 2024-05-19
Payer: MEDICARE

## 2024-05-19 PROCEDURE — 72148 MRI LUMBAR SPINE W/O DYE: CPT

## 2024-06-13 ENCOUNTER — APPOINTMENT (OUTPATIENT)
Dept: PAIN MANAGEMENT | Facility: CLINIC | Age: 74
End: 2024-06-13
Payer: MEDICARE

## 2024-06-13 VITALS — BODY MASS INDEX: 30.48 KG/M2 | WEIGHT: 230 LBS | HEIGHT: 73 IN

## 2024-06-13 PROCEDURE — 99214 OFFICE O/P EST MOD 30 MIN: CPT

## 2024-06-17 NOTE — PHYSICAL EXAM
[de-identified] : L spine   No rashes, erythema, edema noted TTP b/l lumbar paraspinals and sciatic notch Positive straight leg raise bilaterally LLE: 5/5 strength RLE: 5/5 strength Sensation intact b/l LE

## 2024-06-17 NOTE — PROCEDURE
[Trigger point 1-2 muscle groups] : trigger point 1-2 muscle groups [Lumbar paraspinal muscle] : lumbar paraspinal muscle [Pain] : pain [Alcohol] : alcohol [Ethyl Chloride sprayed topically] : ethyl chloride sprayed topically [Sterile technique used] : sterile technique used [___ cc    0.5%] : Bupivacaine (Marcaine) ~Vcc of 0.5%  [___ cc    4mg] : Dexamethasone (Decadron) ~Vcc of 4 mg  [] : Patient tolerated procedure well [Call if redness, pain or fever occur] : call if redness, pain or fever occur [Apply ice for 15min out of every hour for the next 12-24 hours as tolerated] : apply ice for 15 minutes out of every hour for the next 12-24 hours as tolerated [Previous OTC use and PT nontherapeutic] : patient has tried OTC's including aspirin, Ibuprofen, Aleve, etc or prescription NSAIDS, and/or exercises at home and/or physical therapy without satisfactory response [Risks, benefits, alternatives discussed / Verbal consent obtained] : the risks benefits, and alternatives have been discussed, and verbal consent was obtained [de-identified] : Chlorhexidine

## 2024-06-17 NOTE — HISTORY OF PRESENT ILLNESS
[FreeTextEntry1] : Mr. Thmoas is a 73-year-old male presenting to establish care for his hip pain. He has had this pain, on the left side, for many years after many years of wear and tear. He has been seen by Dr. Josiah Pandey who did not recommend surgical correction. He advised him to take Motrin which was not beneficial. He was also seen by another Orthopedics on [facility] and underwent a series of 3 steroid injections which only provided him with temporary relief. He was then seen by Dr. Luis who sent the patient for an MRI of the left hip which unfortunately showed prostate cancer. Due to this, no treatment has been done yet for the hip. He has gained a lot of weight due to hormonal therapy however he is not eating and does not have an appetite. He is currently undergoing the proper treatment for his cancer including radiation. He does state that his cancer metastasis to his lower back. He has undergone many PET scans however he is unsure how this was confirmed. He continues with ongoing pain around the left lower back/buttock area and hip. He has pain with walking or doing any sort of weight bearing activity. Pain is present daily. Pain limits his ADLs.   TODAY, 6-: Revisit encounter.   He is presenting today with ongoing severe lower back pain. He has pain which is mainly on the left side of the lower back and refers into the but buttock and into the hip area. He has sharp, shooting pains. He is unable to apply any pressure or weight over the left leg. Pain is made worse with standing or walking for prolonged periods of time. He is currently undergoing treatment for his prostate cancer. Pain is present daily and limits his ADLs. He is managing his pain with Meloxicam 15 mg prn.

## 2024-06-17 NOTE — DISCUSSION/SUMMARY
[de-identified] : A discussion regarding available pain management treatment options occurred with the patient. These included interventional, rehabilitative, pharmacological, and alternative modalities. We will proceed with the following:   Interventional treatment options: 1. Done in office today: Trigger point injection.  2. Proceed with a L5-S1 lumbar epidural steroid injection without sedation.  Treatment options were discussed. The patient has been having persistent, severe low back pain and lumbar radicular pain that has minimally improved with conservative therapy thus far (including physical therapy, home stretching and anti-inflammatory medications. The patient was given the option of proceeding with a lumbar epidural steroid injection to try and get the patient some pain relief. The risks and benefits were discussed, which included the associated problems with steroids, bleeding, nerve injury, lack of improvement with pain, and 0.5% chance of a post dural puncture headache.   Medications: 1. Refilled Meloxicam 15 mg daily prn.  I advised Mr. Thomas that the NSAID should be taken with food.  In addition while taking the prescribed NSAID, no over the counter or other NSAIDs should be used, such as ibuprofen (Motrin or Advil) or naproxen (Aleve) as this can cause stomach upset or other side effects.  If needed for fever or breakthrough pain Tylenol can be used.   - Follow up in 4 weeks.   I Susannah Copeland attest that this documentation has been prepared under the direction and in the presence of provider Dr. Nino Potter.  The documentation recorded by the scribe in my presence, accurately reflects the service I personally performed, and the decisions made by me with my edits as appropriate.   Nino Potter, DO

## 2024-07-01 ENCOUNTER — APPOINTMENT (OUTPATIENT)
Dept: PAIN MANAGEMENT | Facility: CLINIC | Age: 74
End: 2024-07-01
Payer: MEDICARE

## 2024-07-01 PROBLEM — M54.16 LUMBAR RADICULITIS: Status: ACTIVE | Noted: 2024-06-17

## 2024-07-01 PROCEDURE — 62323 NJX INTERLAMINAR LMBR/SAC: CPT

## 2024-07-08 ENCOUNTER — RX RENEWAL (OUTPATIENT)
Age: 74
End: 2024-07-08

## 2024-07-16 ENCOUNTER — APPOINTMENT (OUTPATIENT)
Dept: PAIN MANAGEMENT | Facility: CLINIC | Age: 74
End: 2024-07-16
Payer: MEDICARE

## 2024-07-16 VITALS — HEIGHT: 73 IN | WEIGHT: 230 LBS | BODY MASS INDEX: 30.48 KG/M2

## 2024-07-16 DIAGNOSIS — M54.16 RADICULOPATHY, LUMBAR REGION: ICD-10-CM

## 2024-07-16 DIAGNOSIS — M16.12 UNILATERAL PRIMARY OSTEOARTHRITIS, LEFT HIP: ICD-10-CM

## 2024-07-16 PROCEDURE — 99214 OFFICE O/P EST MOD 30 MIN: CPT

## 2024-07-16 RX ORDER — MELOXICAM 15 MG/1
15 TABLET ORAL
Qty: 30 | Refills: 0 | Status: ACTIVE | COMMUNITY
Start: 2024-07-16 | End: 1900-01-01

## 2024-07-22 NOTE — PRE-OP CHECKLIST - ADVANCE DIRECTIVE ADDRESSED/READDRESSED
1/2024 Dx of ILANA. Patient getting 4-6 hours of sleep per night. Using Nasal strips, has been helping. Unable to get the CPAP/ unsure why.    Follow up with sleep med.   done

## 2024-08-02 ENCOUNTER — APPOINTMENT (OUTPATIENT)
Dept: ORTHOPEDIC SURGERY | Facility: CLINIC | Age: 74
End: 2024-08-02
Payer: MEDICARE

## 2024-08-02 DIAGNOSIS — M16.12 UNILATERAL PRIMARY OSTEOARTHRITIS, LEFT HIP: ICD-10-CM

## 2024-08-02 PROCEDURE — 99215 OFFICE O/P EST HI 40 MIN: CPT

## 2024-08-03 NOTE — HISTORY OF PRESENT ILLNESS
[de-identified] : 74M with left hip pain secondary to oa also being treated now for prostate CA contemplating surgery  exam heavyset but otherwise well appearing There is pain with flexion and rotation of the hip.  Tenderness over the trochanter, ASIS, abductor and gluteal muscles. Decreased hip flexion strength to 4/5, decreased abduction strength to 4/5.  Decreased internal rotation to 5-10 degrees of midline.  Hip flexion is to 95 degrees, limited by pain and guarding.  plan: surgery discussed with patient We discussed the surgery, including a description of the surgery in layman's terms, preoperative evaluation, the hospital stay, anesthesia, and expected outcome. Models equivalent to the actual hip replacement prosthesis were used to demonstrate the magnitude and scope of the surgery.  Various types of implant fixation including cement and biologic fixation were described.  The patient shared in the decision making and agreed to the use of implants.   The patient will require a rolling walker for 2-4 weeks postoperativley due to gait instability to help with mobility related ADL's and a commode as they confined to a one level without access to a bathroom.  Patient is able to safely use the walker and functional mobility deficit can be sufficiently resolved with use of a walker.   Additionally surgical risks were discussed.  The patient has good understanding of the inherent risks of surgery which include bleeding, pain, and infection, blood clots, and any medical issues that may arise in the perioperative period.  Additionally, we discussed risks uniquely pertinent to hip replacement surgery, including leg length discrepancy, instability, loosening of components, numbness around the incision, nerve palsy, and possible need for revision surgery should the replacement not function optimally.  All questions were answered and the patient verbalized understanding.  I encouraged the patient to contact me should any further questions are issues arise.

## 2024-08-16 ENCOUNTER — RX RENEWAL (OUTPATIENT)
Age: 74
End: 2024-08-16

## 2024-09-02 ENCOUNTER — RX RENEWAL (OUTPATIENT)
Age: 74
End: 2024-09-02

## 2024-09-09 ENCOUNTER — RX RENEWAL (OUTPATIENT)
Age: 74
End: 2024-09-09

## 2024-10-04 ENCOUNTER — RX RENEWAL (OUTPATIENT)
Age: 74
End: 2024-10-04

## 2024-10-09 ENCOUNTER — RX RENEWAL (OUTPATIENT)
Age: 74
End: 2024-10-09

## 2024-10-11 ENCOUNTER — RX RENEWAL (OUTPATIENT)
Age: 74
End: 2024-10-11

## 2024-10-14 ENCOUNTER — APPOINTMENT (OUTPATIENT)
Dept: ENDOCRINOLOGY | Facility: CLINIC | Age: 74
End: 2024-10-14
Payer: MEDICARE

## 2024-10-14 VITALS
WEIGHT: 237 LBS | RESPIRATION RATE: 18 BRPM | BODY MASS INDEX: 31.41 KG/M2 | HEIGHT: 73 IN | HEART RATE: 70 BPM | OXYGEN SATURATION: 98 % | DIASTOLIC BLOOD PRESSURE: 74 MMHG | SYSTOLIC BLOOD PRESSURE: 130 MMHG

## 2024-10-14 DIAGNOSIS — E04.1 NONTOXIC SINGLE THYROID NODULE: ICD-10-CM

## 2024-10-14 DIAGNOSIS — E11.9 TYPE 2 DIABETES MELLITUS W/OUT COMPLICATIONS: ICD-10-CM

## 2024-10-14 DIAGNOSIS — E78.00 PURE HYPERCHOLESTEROLEMIA, UNSPECIFIED: ICD-10-CM

## 2024-10-14 PROCEDURE — 99214 OFFICE O/P EST MOD 30 MIN: CPT

## 2024-10-14 RX ORDER — DULAGLUTIDE 0.75 MG/.5ML
0.75 INJECTION, SOLUTION SUBCUTANEOUS WEEKLY
Qty: 4 | Refills: 11 | Status: ACTIVE | COMMUNITY
Start: 2024-10-14 | End: 1900-01-01

## 2024-11-06 NOTE — PATIENT PROFILE ADULT - NSPRESCRUSEDDRG_GEN_A_NUR
[facility]  Cardiovascular and Thoracic Surgery Daily Note    Today's Plans: relocate CRRT line to R side, wean to extubate as able      Assessment and Plan  POD # 8 s/p CABG x 3 (LIMA to LAD, SVG to OM, SVG to RCA), Modified left atrial MAZE ([facility]), and occlusion of left atrial appendage (50 mm Atriclip) on 10/28 with Dr. Michael Mulvihill.    POD # 8 s/p return to OR for mediastinal re-exploration, washout    - CVS: Pre-op TTE with EF 35-40%. Postop TTE 10/30 with EF ~35% on high-dose inotropic support.   Postop mixed cardiogenic/vasoplegic/hypovolemic shock. Lactic acidosis cleared and SVO2 stabilized. CI goal >2.0 (calculated elaine), stopped DBU 11/4 no back on after dec in SVO2 with inc in lact back on at 2.5. NE and vasopressin to MAP goal 65, wean as able.   Hypervolemic, volume management via CRRT, pull as able. CVP goal ~10-12. Now tolerating more aggressive removal. Albumin 25% push BID to pull volume intravascular.  Hypothermic since initiation on CRRT, Thermagaurd catheter placed 10/31, target 37 C.   Stress dose steroids started 10/31, off 11/02 PM.  History of paroxysmal atrial fibrillation, continues with intermittent afib and accelerated junctional at times. Previously being atrial paced at 100, now appears to have improved BP without pacing (intrinsic rate ~80 BP, sinus rhythm with intermittent atrial fibrillation). Amio discontinued with shock liver. Defer systemic anticoagulation at present (ok for citrate for CRRT machine if needed).   Absent right radial pulse (previous arterial line in this location). ICU MD did bedside US, ulnar artery patent. Right hand warm, good capillary refill. Increased duskiness of right hand noted after starting vasopressin, which demonstrated patent arterial flow with slow biphasic flow at the distal radial artery (site of previous arterial line).   Aspirin 162 mg daily, defer statin with ALI.    Chest tubes: output 975<970 mL, serosang, no air  leak. TPW: AAI back up rate of 50    - Resp: Acute hypoxemic and hypercapnic respiratory failure, stable. Non-compliant with ventilator 11/02, improved with deeper sedation, but required increased pressor support with deeper sedation. Trial of PST 11/03, patient quickly became hypercarbic and acidotic. Tolerated PST 11/5, but appears not quite awake enough for extubation, reassess readiness daily. Will trial after CRRT line replaced on R side    - Neuro: Sedated with propofol while intubated. Add fentanyl infusion and Versed PRN to improve vent compliance. History of myasthenia gravis. Purposeful upper extremity movements and spontaneous LE movement when sedation lightened.     - Renal: CKD stage 3, baseline Cr ~1.8. Postop oliguric/anuric JAVIER. Nephrology consulted, CRRT started 10/30. Avoid nephrotoxins.   Recent Labs   Lab 11/06/24  0514 11/05/24  2209 11/05/24  1349   CR 1.71* 1.69* 1.69*       - GI: +BM, +flatus, continue bowel regimen. Shock liver, improving. Trend LFTs, avoid hepatotoxins. Recent admission for GIB 09/2024, increased pantoprazole to BID. Diarrhea with initiation of TF, rectal tube placed 11/05.    - : Amos in place, continue for UOP monitoring/hemodynamic status    - Endo: Postop stress hyperglycemia, resolved. Insulin infusion transitioned to sliding scale insulin. Stress dose steroids 10/31-11/02 as above.   Hemoglobin A1C   Date Value Ref Range Status   10/09/2024 4.5 <5.7 % Final     Comment:     Normal <5.7%   Prediabetes 5.7-6.4%    Diabetes 6.5% or higher     Note: Adopted from ADA consensus guidelines.        - FEN: Replace electrolytes as needed. Initiated on trophic feeds via OG 11/1, NJ placed 11/2 after INR came down, TF increase to goal.      - ID: WBC chronically elevated and hypothermic on CRRT as above so difficult to assess for possible infection;  empiric Vanc/Zosyn 10/31-11/04. Blood, urine, respiratory cultures NGTD. WBC elevated.  Trend CBC and fever curve.   Recent Labs  "  Lab 11/06/24  0514 11/05/24 2209 11/05/24  1349   WBC 41.0* 44.2* 53.5*       - Heme: Myeloproliferative neoplasm, [id number] mutation positive, leukocytosis with neutrophilia related to #1 and acute illness, baseline WBC 20-40K. Acute blood loss anemia due to surgery. Postop coagulopathy, improved (required multiple blood transfusions and blood productions immediately postop). 1 unit RBCs 11/3. 1 unit RBC 11/5 Trend CBC, transfuse PRN.   Recent Labs   Lab 11/06/24  0514 11/05/24 2209 11/05/24  1349   HGB 7.5* 7.7* 7.8*    189 190       - Proph: SCD, subcutaneous heparin, PPI    - Other:  Clinically Significant Risk Factors           # Hypocalcemia: Lowest Ca = 7.9 mg/dL in last 2 days, will monitor and replace as appropriate     # Hypoalbuminemia: Lowest albumin = 2.7 g/dL at 11/2/2024  5:44 AM, will monitor as appropriate    # Coagulation Defect: INR = 1.71 (Ref range: 0.85 - 1.15) and/or PTT = 42 Seconds (Ref range: 22 - 38 Seconds), will monitor for bleeding    # Hypertension: Noted on problem list  # Chronic heart failure with reduced ejection fraction: last echo with EF <40%   # Acute Hypoxic Respiratory Failure: Documented O2 saturation < 90%. Continue supplemental oxygen as needed  # Acute Hypercapnic Respiratory Failure: based on arterial blood gas results.  Continue supplemental oxygen and ventilatory support as indicated.  # Acute Hypercapnic Respiratory Failure: based on venous blood gas results.  Continue supplemental oxygen and ventilatory support as indicated.      #Acute blood loss anemia: Lowest Hgb this hospitalization: 6.9 g/dL. Will continue to monitor and treat/transfuse as appropriate.     # Obesity: Estimated body mass index is 32.49 kg/m  as calculated from the following:    Height as of this encounter: 1.778 m (5' 10\").    Weight as of this encounter: 102.7 kg (226 lb 6.6 oz).       # History of CABG: noted on surgical history       - Dispo: ICU. Guarded prognosis but continues " with improving liver function. Family updated multiple times at bedside. Medically Ready for Discharge: Anticipated in 5+ Days      Interval History  Continues to tolerate CRRT with fluid removal, on DBU 2.5 for dec CI, plans to switch CRRT line to R side and wean to extubate today.       Medications  Current Facility-Administered Medications   Medication Dose Route Frequency Provider Last Rate Last Admin    albumin human 25 % injection 25 g  25 g Intravenous Q12H Arlin Hodge PA-C 100 mL/hr at 11/04/24 1946 25 g at 11/06/24 0824    aspirin (ASA) chewable tablet 162 mg  162 mg Oral or NG Tube Daily Clarence Bone MD   162 mg at 11/06/24 0824    chlorhexidine (PERIDEX) 0.12 % solution 15 mL  15 mL Swish & Spit BID Zac Kaur DO   15 mL at 11/06/24 0823    insulin aspart (NovoLOG) injection (RAPID ACTING)  1-7 Units Subcutaneous Q4H Sb Arthur PA-C   1 Units at 11/05/24 1908    pantoprazole (PROTONIX) 2 mg/mL suspension 40 mg  40 mg Oral or NG Tube BID AC Arlin Hodge PA-C   40 mg at 11/06/24 0824    Or    pantoprazole (PROTONIX) EC tablet 40 mg  40 mg Oral BID AC Arlin Hodge PA-C        polyethylene glycol (MIRALAX) Packet 17 g  17 g Oral or Feeding Tube Daily Mulvihill, Michael, MD   17 g at 11/03/24 0812    Prosource TF20 ENfit Compatibl EN LIQD (PROSOURCE TF20) packet 60 mL  1 packet Per Feeding Tube TID Arlin Hodge PA-C   60 mL at 11/06/24 0824    senna-docusate (SENOKOT-S/PERICOLACE) 8.6-50 MG per tablet 1 tablet  1 tablet Oral or Feeding Tube BID Mulvihill, Michael, MD   1 tablet at 11/05/24 2123    sodium chloride (PF) 0.9% PF flush 3 mL  3 mL Intracatheter Q8H Clarence Bone MD   3 mL at 11/06/24 0818     Current Facility-Administered Medications   Medication Dose Route Frequency Provider Last Rate Last Admin    acetaminophen (TYLENOL) tablet 650 mg  650 mg Oral Q4H PRN Clarence Bone MD        bisacodyl (DULCOLAX) suppository 10 mg  10 mg Rectal  Daily PRN Clarence Bone MD        calcium gluconate 2 g in  mL intermittent infusion  2 g Intravenous Q8H PRN Farhad Boland MD        calcium gluconate 4 g in sodium chloride 0.9 % 100 mL intermittent infusion  4 g Intravenous Q8H PRN Farhad Boland MD        dextrose 10% infusion   Intravenous Continuous PRN Arlin Hodge PA-C   Stopped at 11/05/24 0600    glucose gel 15-30 g  15-30 g Oral Q15 Min PRN Nasir Vallecillo MD        Or    dextrose 50 % injection 25-50 mL  25-50 mL Intravenous Q15 Min PRN Nasir Vallecillo MD        Or    glucagon injection 1 mg  1 mg Subcutaneous Q15 Min PRN Nasir Vallecillo MD        EPINEPHrine (ADRENALIN) 5 mg in  mL infusion  0.01-0.1 mcg/kg/min Intravenous Continuous PRN Clarence Bone MD 20.1 mL/hr at 11/06/24 0509 0.07 mcg/kg/min at 11/06/24 0509    fentaNYL (PF) (SUBLIMAZE) injection 25 mcg  25 mcg Intravenous Q1H PRN Bhanu Ness MD   25 mcg at 11/06/24 0009    heparin lock flush 10 unit/mL injection 3 mL  3 mL Intracatheter Q1H PRN Tyra Dubois MD        hydrALAZINE (APRESOLINE) injection 10 mg  10 mg Intravenous Q30 Min PRN Clarence Bone MD        lidocaine (LMX4) cream   Topical Q1H PRN Clarence Bone MD        lidocaine 1 % 0.1-1 mL  0.1-1 mL Other Q1H PRN Clarence Bone MD        magnesium hydroxide (MILK OF MAGNESIA) suspension 30 mL  30 mL Oral Daily PRN Clarence Bone MD        magnesium sulfate 2 g in 50 mL sterile water intermittent infusion  2 g Intravenous Q8H PRN Farhad Boland MD   2 g at 11/04/24 1450    propofol (DIPRIVAN) bolus from bag or syringe pump  10 mg Intravenous Q15 Min PRN Tyra Dubois MD        And    Medication Instruction   Does not apply Continuous PRN Tyra Dubois MD        methocarbamol (ROBAXIN) tablet 500 mg  500 mg Oral Q6H PRN Clarence Bone MD   500 mg at 11/06/24 0303    naloxone (NARCAN) injection 0.2 mg  0.2 mg  "Intravenous Q2 Min PRN Mulvihill, Michael, MD        Or    naloxone (NARCAN) injection 0.4 mg  0.4 mg Intravenous Q2 Min PRN Mulvihill, Michael, MD        Or    naloxone (NARCAN) injection 0.2 mg  0.2 mg Intramuscular Q2 Min PRN Mulvihill, Michael, MD        Or    naloxone (NARCAN) injection 0.4 mg  0.4 mg Intramuscular Q2 Min PRN Mulvihill, Michael, MD        No heparin required   Does not apply Continuous PRN Farhad Boland MD        ondansetron (ZOFRAN ODT) ODT tab 4 mg  4 mg Oral Q6H PRN Clarence Bone MD        Or    ondansetron (ZOFRAN) injection 4 mg  4 mg Intravenous Q6H PRN Clarence Bone MD        oxyCODONE IR (ROXICODONE) half-tab 2.5 mg  2.5 mg Oral Q4H PRN Clarence Bone MD   2.5 mg at 11/02/24 0214    Or    oxyCODONE (ROXICODONE) tablet 5 mg  5 mg Oral Q4H PRN Clarence Bone MD   5 mg at 11/06/24 0305    potassium chloride 20 mEq in 50 mL intermittent infusion  20 mEq Intravenous Q8H PRN Farhad Boland MD 50 mL/hr at 11/04/24 1200 20 mEq at 11/04/24 1200    prochlorperazine (COMPAZINE) injection 5 mg  5 mg Intravenous Q6H PRN Clarence Bone MD        Or    prochlorperazine (COMPAZINE) tablet 5 mg  5 mg Oral Q6H PRN Clarence Bone MD        Reason beta blocker order not selected   Does not apply DOES NOT GO TO Clarence Olguin MD        sodium chloride (PF) 0.9% PF flush 3 mL  3 mL Intracatheter q1 min prn Clarence Bone MD        sodium phosphate 15 mmol in NS 250mL intermittent infusion  15 mmol Intravenous Q8H PRN Farhad Boland MD             Physical Exam  Vitals were reviewed  Blood pressure 123/68, pulse 82, temperature 97.7  F (36.5  C), temperature source Esophageal, resp. rate 19, height 1.778 m (5' 10\"), weight 102.7 kg (226 lb 6.6 oz), SpO2 98%.  Rhythm: intermittent NSR and atrial fibrillation    Lungs: diminished bases, +crackles    Cardiovascular: irregular rate/rhythm, no m/r/g    Abdomen: soft, NT, ND, +BS    Extremeties: 2+ " BLE/BUE edema    Incision: CDI    CT: serosang output 975<1580 mL, no air leak    Weight:   Vitals:    11/02/24 0400 11/03/24 0600 11/04/24 0400 11/05/24 0300   Weight: 108.9 kg (240 lb 1.3 oz) 111.7 kg (246 lb 4.1 oz) 109.4 kg (241 lb 2.9 oz) 104.5 kg (230 lb 6.1 oz)    11/06/24 0200   Weight: 102.7 kg (226 lb 6.6 oz)         Data  Recent Labs   Lab 11/06/24  0540 11/06/24  0514 11/05/24  2222 11/05/24  2209 11/05/24  1733 11/05/24  1349 11/05/24  0509 11/05/24  0456 11/04/24  0550 11/04/24  0545   WBC  --  41.0*  --  44.2*  --  53.5*  --  59.1*   < > 63.2*   HGB  --  7.5*  --  7.7*  --  7.8*  --  7.6*   < > 7.8*   MCV  --  87  --  87  --  88  --  90   < > 89   PLT  --  187  --  189  --  190  --  198   < > 195   INR  --  1.71*  --   --   --   --   --  1.85*  --  2.11*   NA  --  135  --  136  --  137  --  135   < > 137   POTASSIUM  --  3.8  --  4.0  --  4.0  --  3.8   < > 3.3*   CHLORIDE  --  100  --  99  --  101  --  100   < > 101   CO2  --  23  --  23  --  24  --  23   < > 25   BUN  --  30.0*  --  32.1*  --  30.3*  --  27.3*   < > 25.9*   CR  --  1.71*  --  1.69*  --  1.69*  --  1.76*   < > 1.73*   ANIONGAP  --  12  --  14  --  12  --  12   < > 11   TATE  --  9.4  --  9.2  --  9.1  --  9.2   < > 8.7*   * 149* 133* 141*   < > 141*   < > 135*   < > 120*   ALBUMIN  --  3.6  --  3.7  --  3.8  --  3.7   < > 3.4*   PROTTOTAL  --  5.0*  --  5.1*  --  5.0*  --  5.1*   < > 4.8*   BILITOTAL  --  2.1*  --  2.0*  --  2.3*  --  2.5*   < > 2.6*   ALKPHOS  --  153*  --  164*  --  168*  --  178*   < > 173*   ALT  --  223*  --  257*  --  294*  --  353*   < > 480*   AST  --  101*  --  135*  --  171*  --  235*   < > 404*    < > = values in this interval not displayed.       Imaging:  Recent Results (from the past 24 hours)   POC US Guidance Needle Placement    Impression    Ultrasound was used to guide needle for central line placement.  The target vein is visualized.  See separate procedure note for full details.     XR Chest  Port 1 View    Narrative    EXAM: XR CHEST PORT 1 VIEW  LOCATION: [facility]  DATE: 11/5/2024    INDICATION: New line placement, right subclavian verify distal end tip  COMPARISON: 11/3/2024      Impression    IMPRESSION:     New right subclavian line tip near the distal SVC. Right IJ pulmonary artery catheter tip near the outflow tract. Left IJ catheter tip difficult to see, though likely in similar position. Mediastinal and chest drains in place. ET tube tip 4.5 cm above   the dayanna. Enteric tube courses into the stomach and off the field-of-view.     Hypoexpanded lungs. Mild basilar atelectasis. Cannot exclude small pleural effusions. No pneumothorax. Stable cardiomediastinal silhouette. Sternotomy. Left age. Clip.             Patient seen and discussed with Dr. Lisa Nguyen PA-C  Cardiothoracic Surgery  Available for paging [phone] (personal pager or CV Surgery Rounding Pager)  Personal Pager: [phone]  CV Surgery Rounding Pager: [phone]  After hours please page surgeon on-call       No

## 2025-01-30 NOTE — ED PROVIDER NOTE - CLINICAL SUMMARY MEDICAL DECISION MAKING FREE TEXT BOX
What Type Of Note Output Would You Prefer (Optional)?: Standard Output How Severe Is Your Skin Lesion?: moderate Has Your Skin Lesion Been Treated?: not been treated Is This A New Presentation, Or A Follow-Up?: Skin Lesions 72-year-old male with history of hypertension, diabetes, thyroid cancer, presents with aphasia. He states he went to sleep at 8 PM, once his last time he tried to speak. When he tried to talk to his wife at 6 AM today he was unable to produce words. It is significantly improved to this point he states, though not 100%. He states similar to past TIA he had 20 years ago. Also reports around the same time last night began having some left-sided frontal head pressure and left-sided chest pressure. Denies all other symptoms including fever, cough, recent long distance travel, shortness of breath, vomiting, diarrhea, dizziness, vision changes. On exam, afebrile, hemodynamically stable, saturating well on room air, NAD, well appearing, sitting comfortably in chair, no WOB, speaking full sentences, head NCAT, pupils equal, EOMI, anicteric, no conjunctival injection, MMM, no JVD, RRR, nml S1/S2, no m/r/g, lungs CTAB, no w/r/r, abd soft, NT, ND, nml BS, no rebound or guarding, AAO, CN's 3-12 intact, motor 5/5 and sensation symmetric in all extremities, finger-to-nose normal, no dysarthria, very mild hesitancy of speech noted, [place] spontaneously, no leg cyanosis or edema, skin warm, well perfused, no rashes or hives. Code stroke called on arrival and comanaged with neurology team. Patient is out of the window for tPA and no LVO on CTA. Character and context and exam low suspicion for temporal arteritis to warrant further work-up for this. No fever or meningeal signs. Character low suspicion for dissection and no murmur or pulse asymmetry. Character low suspicion for PE and no tachycardia, hypoxia, or e/o DVT or acute risk factors for this. Character low suspicion for ACS and ECG/trop unremarkable. No e/o PNA, PTX, or fluid overload on exam or CXR. Given aspirin/Plavix by neurology recommendation and sent to obs for MRI work-up.

## 2025-02-25 ENCOUNTER — RX RENEWAL (OUTPATIENT)
Age: 75
End: 2025-02-25

## 2025-03-04 ENCOUNTER — APPOINTMENT (OUTPATIENT)
Dept: ORTHOPEDIC SURGERY | Facility: CLINIC | Age: 75
End: 2025-03-04
Payer: MEDICARE

## 2025-03-04 DIAGNOSIS — M16.12 UNILATERAL PRIMARY OSTEOARTHRITIS, LEFT HIP: ICD-10-CM

## 2025-03-04 PROCEDURE — 99213 OFFICE O/P EST LOW 20 MIN: CPT

## 2025-04-29 ENCOUNTER — RESULT REVIEW (OUTPATIENT)
Age: 75
End: 2025-04-29

## 2025-04-29 ENCOUNTER — OUTPATIENT (OUTPATIENT)
Dept: OUTPATIENT SERVICES | Facility: HOSPITAL | Age: 75
LOS: 1 days | End: 2025-04-29
Payer: MEDICARE

## 2025-04-29 VITALS
SYSTOLIC BLOOD PRESSURE: 157 MMHG | RESPIRATION RATE: 16 BRPM | HEIGHT: 73 IN | HEART RATE: 70 BPM | DIASTOLIC BLOOD PRESSURE: 97 MMHG | WEIGHT: 229.94 LBS | OXYGEN SATURATION: 97 % | TEMPERATURE: 98 F

## 2025-04-29 DIAGNOSIS — D49.0 NEOPLASM OF UNSPECIFIED BEHAVIOR OF DIGESTIVE SYSTEM: Chronic | ICD-10-CM

## 2025-04-29 DIAGNOSIS — Z01.818 ENCOUNTER FOR OTHER PREPROCEDURAL EXAMINATION: ICD-10-CM

## 2025-04-29 DIAGNOSIS — M16.12 UNILATERAL PRIMARY OSTEOARTHRITIS, LEFT HIP: ICD-10-CM

## 2025-04-29 DIAGNOSIS — Z98.890 OTHER SPECIFIED POSTPROCEDURAL STATES: Chronic | ICD-10-CM

## 2025-04-29 DIAGNOSIS — Z90.89 ACQUIRED ABSENCE OF OTHER ORGANS: Chronic | ICD-10-CM

## 2025-04-29 PROBLEM — C73 MALIGNANT NEOPLASM OF THYROID GLAND: Chronic | Status: INACTIVE | Noted: 2021-07-29 | Resolved: 2025-04-29

## 2025-04-29 LAB
A1C WITH ESTIMATED AVERAGE GLUCOSE RESULT: 7.1 % — HIGH (ref 4–5.6)
ALBUMIN SERPL ELPH-MCNC: 4.5 G/DL — SIGNIFICANT CHANGE UP (ref 3.5–5.2)
ALP SERPL-CCNC: 81 U/L — SIGNIFICANT CHANGE UP (ref 30–115)
ALT FLD-CCNC: 18 U/L — SIGNIFICANT CHANGE UP (ref 0–41)
ANION GAP SERPL CALC-SCNC: 13 MMOL/L — SIGNIFICANT CHANGE UP (ref 7–14)
APTT BLD: 33.2 SEC — SIGNIFICANT CHANGE UP (ref 27–39.2)
AST SERPL-CCNC: 21 U/L — SIGNIFICANT CHANGE UP (ref 0–41)
BASOPHILS # BLD AUTO: 0.06 K/UL — SIGNIFICANT CHANGE UP (ref 0–0.2)
BASOPHILS NFR BLD AUTO: 1 % — SIGNIFICANT CHANGE UP (ref 0–1)
BILIRUB SERPL-MCNC: 0.4 MG/DL — SIGNIFICANT CHANGE UP (ref 0.2–1.2)
BLD GP AB SCN SERPL QL: SIGNIFICANT CHANGE UP
BUN SERPL-MCNC: 18 MG/DL — SIGNIFICANT CHANGE UP (ref 10–20)
CALCIUM SERPL-MCNC: 10.3 MG/DL — SIGNIFICANT CHANGE UP (ref 8.4–10.5)
CHLORIDE SERPL-SCNC: 106 MMOL/L — SIGNIFICANT CHANGE UP (ref 98–110)
CO2 SERPL-SCNC: 24 MMOL/L — SIGNIFICANT CHANGE UP (ref 17–32)
CREAT SERPL-MCNC: 1 MG/DL — SIGNIFICANT CHANGE UP (ref 0.7–1.5)
EGFR: 80 ML/MIN/1.73M2 — SIGNIFICANT CHANGE UP
EGFR: 80 ML/MIN/1.73M2 — SIGNIFICANT CHANGE UP
EOSINOPHIL # BLD AUTO: 0.15 K/UL — SIGNIFICANT CHANGE UP (ref 0–0.7)
EOSINOPHIL NFR BLD AUTO: 2.5 % — SIGNIFICANT CHANGE UP (ref 0–8)
ESTIMATED AVERAGE GLUCOSE: 157 MG/DL — HIGH (ref 68–114)
GLUCOSE SERPL-MCNC: 157 MG/DL — HIGH (ref 70–99)
HCT VFR BLD CALC: 40.1 % — LOW (ref 42–52)
HGB BLD-MCNC: 14 G/DL — SIGNIFICANT CHANGE UP (ref 14–18)
IMM GRANULOCYTES NFR BLD AUTO: 1.3 % — HIGH (ref 0.1–0.3)
INR BLD: 0.85 RATIO — SIGNIFICANT CHANGE UP (ref 0.65–1.3)
LYMPHOCYTES # BLD AUTO: 0.76 K/UL — LOW (ref 1.2–3.4)
LYMPHOCYTES # BLD AUTO: 12.6 % — LOW (ref 20.5–51.1)
MCHC RBC-ENTMCNC: 30.8 PG — SIGNIFICANT CHANGE UP (ref 27–31)
MCHC RBC-ENTMCNC: 34.9 G/DL — SIGNIFICANT CHANGE UP (ref 32–37)
MCV RBC AUTO: 88.1 FL — SIGNIFICANT CHANGE UP (ref 80–94)
MONOCYTES # BLD AUTO: 0.56 K/UL — SIGNIFICANT CHANGE UP (ref 0.1–0.6)
MONOCYTES NFR BLD AUTO: 9.3 % — SIGNIFICANT CHANGE UP (ref 1.7–9.3)
MRSA PCR RESULT.: NEGATIVE — SIGNIFICANT CHANGE UP
NEUTROPHILS # BLD AUTO: 4.43 K/UL — SIGNIFICANT CHANGE UP (ref 1.4–6.5)
NEUTROPHILS NFR BLD AUTO: 73.3 % — SIGNIFICANT CHANGE UP (ref 42.2–75.2)
NRBC BLD AUTO-RTO: 0 /100 WBCS — SIGNIFICANT CHANGE UP (ref 0–0)
PLATELET # BLD AUTO: 271 K/UL — SIGNIFICANT CHANGE UP (ref 130–400)
PMV BLD: 9.4 FL — SIGNIFICANT CHANGE UP (ref 7.4–10.4)
POTASSIUM SERPL-MCNC: 4.4 MMOL/L — SIGNIFICANT CHANGE UP (ref 3.5–5)
POTASSIUM SERPL-SCNC: 4.4 MMOL/L — SIGNIFICANT CHANGE UP (ref 3.5–5)
PROT SERPL-MCNC: 6.6 G/DL — SIGNIFICANT CHANGE UP (ref 6–8)
PROTHROM AB SERPL-ACNC: 10 SEC — SIGNIFICANT CHANGE UP (ref 9.95–12.87)
RBC # BLD: 4.55 M/UL — LOW (ref 4.7–6.1)
RBC # FLD: 12.7 % — SIGNIFICANT CHANGE UP (ref 11.5–14.5)
SODIUM SERPL-SCNC: 143 MMOL/L — SIGNIFICANT CHANGE UP (ref 135–146)
WBC # BLD: 6.04 K/UL — SIGNIFICANT CHANGE UP (ref 4.8–10.8)
WBC # FLD AUTO: 6.04 K/UL — SIGNIFICANT CHANGE UP (ref 4.8–10.8)

## 2025-04-29 PROCEDURE — 83036 HEMOGLOBIN GLYCOSYLATED A1C: CPT

## 2025-04-29 PROCEDURE — 87640 STAPH A DNA AMP PROBE: CPT

## 2025-04-29 PROCEDURE — 86850 RBC ANTIBODY SCREEN: CPT

## 2025-04-29 PROCEDURE — 73502 X-RAY EXAM HIP UNI 2-3 VIEWS: CPT | Mod: LT

## 2025-04-29 PROCEDURE — 86900 BLOOD TYPING SEROLOGIC ABO: CPT

## 2025-04-29 PROCEDURE — 36415 COLL VENOUS BLD VENIPUNCTURE: CPT

## 2025-04-29 PROCEDURE — 87641 MR-STAPH DNA AMP PROBE: CPT

## 2025-04-29 PROCEDURE — 85730 THROMBOPLASTIN TIME PARTIAL: CPT

## 2025-04-29 PROCEDURE — 85610 PROTHROMBIN TIME: CPT

## 2025-04-29 PROCEDURE — 73502 X-RAY EXAM HIP UNI 2-3 VIEWS: CPT | Mod: 26,LT

## 2025-04-29 PROCEDURE — 86901 BLOOD TYPING SEROLOGIC RH(D): CPT

## 2025-04-29 PROCEDURE — 85025 COMPLETE CBC W/AUTO DIFF WBC: CPT

## 2025-04-29 PROCEDURE — 80053 COMPREHEN METABOLIC PANEL: CPT

## 2025-04-29 PROCEDURE — 93005 ELECTROCARDIOGRAM TRACING: CPT

## 2025-04-29 PROCEDURE — 99214 OFFICE O/P EST MOD 30 MIN: CPT | Mod: 25

## 2025-04-29 PROCEDURE — 93010 ELECTROCARDIOGRAM REPORT: CPT

## 2025-04-29 RX ORDER — CELECOXIB 50 MG/1
200 CAPSULE ORAL ONCE
Refills: 0 | Status: COMPLETED | OUTPATIENT
Start: 2025-05-16 | End: 2025-05-16

## 2025-04-29 RX ORDER — ACETAMINOPHEN 500 MG/5ML
1000 LIQUID (ML) ORAL ONCE
Refills: 0 | Status: COMPLETED | OUTPATIENT
Start: 2025-05-16 | End: 2025-05-16

## 2025-04-29 NOTE — H&P PST ADULT - NSICDXPASTMEDICALHX_GEN_ALL_CORE_FT
PAST MEDICAL HISTORY:  DM2 (diabetes mellitus, type 2)     History of aortic stenosis     History of TIAs     HTN (hypertension)     OA (osteoarthritis)     Prostate cancer     Prostate cancer metastatic to bone     Squamous cell cancer of tongue      PAST MEDICAL HISTORY:  DM2 (diabetes mellitus, type 2)     Former smoker     History of aortic stenosis     History of hepatitis C     History of TIAs     HTN (hypertension)     OA (osteoarthritis)     Obesity (BMI 30-39.9)     Prostate cancer     Prostate cancer metastatic to bone     Squamous cell cancer of tongue

## 2025-04-29 NOTE — H&P PST ADULT - HISTORY OF PRESENT ILLNESS
73 y/o male presents to PAST in preparation for left total hip replacement in [facility] under regional with Dr. Olivas on 5/16/25    Pt with left hip pain for the past 3 years, pt with pain of 8/10, aching in nature, radiates to back. Pt has tried conservative measures such as pain management with epidurals with temporary relief. Pt with osteoarthritis, reports his hip is "bone on bone". Pt now for above procedure.    Pt with hx of squamous cell carcinoma of tongue in 2016, pt had radiation and chemo tx. Pt with recent prostate cancer with metastasis in 10/23 followed by oncology urology Dr. Maricarmen Souza,      PATIENT CURRENTLY DENIES CHEST PAIN  SHORTNESS OF BREATH  PALPITATIONS,  DYSURIA, OR UPPER RESPIRATORY INFECTION IN PAST 2 WEEKS  Patient understands instructions and was given the opportunity to ask questions and have them answered.  As per patient, this is their complete medical and surgical history, including medications both prescribed or over the counter.   written and verbal instructions with teach back on chlorhexidine shampoo provided,  pt verbalized understanding with returned demonstration    Anesthesia Alert  NO--Difficult Airway  Yes--History of neck surgery or radiation-radiation and sx in 2016 to tongue   NO--Limited ROM of neck  NO--History of Malignant hyperthermia  NO--Personal or family history of Pseudocholinesterase deficiency.  NO--Prior Anesthesia Complication  NO--Latex Allergy  NO--Loose teeth  NO--History of Rheumatoid Arthritis  NO--MARCIAL  NO--Bleeding risk  NO--Other_____  Mallampati airway: Class IV     [facility] Activity Status Index (DASI)       RESULT SUMMARY:  26.95 points  The higher the score (maximum 58.2), the higher the functional status.    6.05 METs        INPUTS:  Take care of self —> 2.75 = Yes  Walk indoors —> 1.75 = Yes  Walk 1&ndash;2 blocks on level ground —> 2.75 = Yes  Climb a flight of stairs or walk up a hill —> 5.5 = Yes  Run a short distance —> 8 = Yes  Do light work around the house —> 2.7 = Yes  Do moderate work around the house —> 3.5 = Yes  Do heavy work around the house —> 0 = No  Do yardwork —> 0 = No  Have sexual relations —> 0 = No  Participate in moderate recreational activities —> 0 = No  Participate in strenuous sports —> 0 = No    Revised Cardiac Risk Index for Pre-Operative Risk       RESULT SUMMARY:  2 points  RCRI Score    10.1 %  Risk of major cardiac event      INPUTS:  High-risk surgery —> 0 = No  History of ischemic heart disease —> 0 = No  History of congestive heart failure —> 0 = No  History of cerebrovascular disease —> 1 = Yes  Pre-operative treatment with insulin —> 1 = Yes  Pre-operative creatinine >2 mg/dL / 176.8 µmol/L —> 0 = No

## 2025-04-29 NOTE — H&P PST ADULT - REASON FOR ADMISSION
75 y/o male presents to PAST in preparation for left total hip replacement in [facility] under regional with Dr. Olivas on 5/16/25

## 2025-04-30 DIAGNOSIS — Z01.818 ENCOUNTER FOR OTHER PREPROCEDURAL EXAMINATION: ICD-10-CM

## 2025-04-30 DIAGNOSIS — M16.12 UNILATERAL PRIMARY OSTEOARTHRITIS, LEFT HIP: ICD-10-CM

## 2025-05-13 ENCOUNTER — OUTPATIENT (OUTPATIENT)
Dept: OUTPATIENT SERVICES | Facility: HOSPITAL | Age: 75
LOS: 1 days | End: 2025-05-13
Payer: MEDICARE

## 2025-05-13 DIAGNOSIS — Z90.89 ACQUIRED ABSENCE OF OTHER ORGANS: Chronic | ICD-10-CM

## 2025-05-13 DIAGNOSIS — Z00.8 ENCOUNTER FOR OTHER GENERAL EXAMINATION: ICD-10-CM

## 2025-05-13 DIAGNOSIS — Z98.890 OTHER SPECIFIED POSTPROCEDURAL STATES: Chronic | ICD-10-CM

## 2025-05-13 DIAGNOSIS — D49.0 NEOPLASM OF UNSPECIFIED BEHAVIOR OF DIGESTIVE SYSTEM: Chronic | ICD-10-CM

## 2025-05-13 DIAGNOSIS — Z03.89 ENCOUNTER FOR OBSERVATION FOR OTHER SUSPECTED DISEASES AND CONDITIONS RULED OUT: ICD-10-CM

## 2025-05-13 PROBLEM — Z86.19 PERSONAL HISTORY OF OTHER INFECTIOUS AND PARASITIC DISEASES: Chronic | Status: ACTIVE | Noted: 2025-04-29

## 2025-05-13 PROBLEM — C61 MALIGNANT NEOPLASM OF PROSTATE: Chronic | Status: ACTIVE | Noted: 2025-04-29

## 2025-05-13 PROBLEM — Z87.891 PERSONAL HISTORY OF NICOTINE DEPENDENCE: Chronic | Status: ACTIVE | Noted: 2025-04-29

## 2025-05-13 PROBLEM — Z86.79 PERSONAL HISTORY OF OTHER DISEASES OF THE CIRCULATORY SYSTEM: Chronic | Status: ACTIVE | Noted: 2025-04-29

## 2025-05-13 PROBLEM — E66.9 OBESITY, UNSPECIFIED: Chronic | Status: ACTIVE | Noted: 2025-04-29

## 2025-05-13 PROBLEM — M19.90 UNSPECIFIED OSTEOARTHRITIS, UNSPECIFIED SITE: Chronic | Status: ACTIVE | Noted: 2025-04-29

## 2025-05-13 PROBLEM — C02.9 MALIGNANT NEOPLASM OF TONGUE, UNSPECIFIED: Chronic | Status: ACTIVE | Noted: 2025-04-29

## 2025-05-13 PROBLEM — Z86.73 PERSONAL HISTORY OF TRANSIENT ISCHEMIC ATTACK (TIA), AND CEREBRAL INFARCTION WITHOUT RESIDUAL DEFICITS: Chronic | Status: ACTIVE | Noted: 2025-04-29

## 2025-05-13 PROCEDURE — 73721 MRI JNT OF LWR EXTRE W/O DYE: CPT | Mod: 26,LT

## 2025-05-13 PROCEDURE — 73721 MRI JNT OF LWR EXTRE W/O DYE: CPT | Mod: LT

## 2025-05-14 DIAGNOSIS — Z03.89 ENCOUNTER FOR OBSERVATION FOR OTHER SUSPECTED DISEASES AND CONDITIONS RULED OUT: ICD-10-CM

## 2025-05-16 ENCOUNTER — INPATIENT (INPATIENT)
Facility: HOSPITAL | Age: 75
LOS: 0 days | Discharge: HOME HEALTH | DRG: 470 | End: 2025-05-17
Attending: ORTHOPAEDIC SURGERY | Admitting: ORTHOPAEDIC SURGERY
Payer: MEDICARE

## 2025-05-16 ENCOUNTER — RESULT REVIEW (OUTPATIENT)
Age: 75
End: 2025-05-16

## 2025-05-16 ENCOUNTER — TRANSCRIPTION ENCOUNTER (OUTPATIENT)
Age: 75
End: 2025-05-16

## 2025-05-16 ENCOUNTER — APPOINTMENT (OUTPATIENT)
Dept: ORTHOPEDIC SURGERY | Facility: HOSPITAL | Age: 75
End: 2025-05-16

## 2025-05-16 VITALS
OXYGEN SATURATION: 98 % | RESPIRATION RATE: 17 BRPM | SYSTOLIC BLOOD PRESSURE: 146 MMHG | HEART RATE: 60 BPM | TEMPERATURE: 98 F | DIASTOLIC BLOOD PRESSURE: 84 MMHG | HEIGHT: 73 IN | WEIGHT: 229.06 LBS

## 2025-05-16 DIAGNOSIS — M16.12 UNILATERAL PRIMARY OSTEOARTHRITIS, LEFT HIP: ICD-10-CM

## 2025-05-16 DIAGNOSIS — D49.0 NEOPLASM OF UNSPECIFIED BEHAVIOR OF DIGESTIVE SYSTEM: Chronic | ICD-10-CM

## 2025-05-16 DIAGNOSIS — Z98.890 OTHER SPECIFIED POSTPROCEDURAL STATES: Chronic | ICD-10-CM

## 2025-05-16 DIAGNOSIS — Z90.89 ACQUIRED ABSENCE OF OTHER ORGANS: Chronic | ICD-10-CM

## 2025-05-16 DIAGNOSIS — Z96.642 PRESENCE OF LEFT ARTIFICIAL HIP JOINT: ICD-10-CM

## 2025-05-16 LAB
GLUCOSE BLDC GLUCOMTR-MCNC: 173 MG/DL — HIGH (ref 70–99)
GLUCOSE BLDC GLUCOMTR-MCNC: 196 MG/DL — HIGH (ref 70–99)
GLUCOSE BLDC GLUCOMTR-MCNC: 209 MG/DL — HIGH (ref 70–99)

## 2025-05-16 PROCEDURE — 97116 GAIT TRAINING THERAPY: CPT | Mod: GP

## 2025-05-16 PROCEDURE — 88311 DECALCIFY TISSUE: CPT | Mod: 26

## 2025-05-16 PROCEDURE — 82962 GLUCOSE BLOOD TEST: CPT

## 2025-05-16 PROCEDURE — 27130 TOTAL HIP ARTHROPLASTY: CPT | Mod: LT

## 2025-05-16 PROCEDURE — 97110 THERAPEUTIC EXERCISES: CPT | Mod: GP

## 2025-05-16 PROCEDURE — 88305 TISSUE EXAM BY PATHOLOGIST: CPT | Mod: 26

## 2025-05-16 PROCEDURE — 73501 X-RAY EXAM HIP UNI 1 VIEW: CPT | Mod: LT

## 2025-05-16 PROCEDURE — 85027 COMPLETE CBC AUTOMATED: CPT

## 2025-05-16 PROCEDURE — 36415 COLL VENOUS BLD VENIPUNCTURE: CPT

## 2025-05-16 PROCEDURE — 97535 SELF CARE MNGMENT TRAINING: CPT | Mod: GO

## 2025-05-16 PROCEDURE — 80048 BASIC METABOLIC PNL TOTAL CA: CPT

## 2025-05-16 RX ORDER — DULAGLUTIDE 4.5 MG/.5ML
0.75 INJECTION, SOLUTION SUBCUTANEOUS
Refills: 0 | DISCHARGE

## 2025-05-16 RX ORDER — INSULIN LISPRO 100 U/ML
2 INJECTION, SOLUTION INTRAVENOUS; SUBCUTANEOUS ONCE
Refills: 0 | Status: COMPLETED | OUTPATIENT
Start: 2025-05-16 | End: 2025-05-16

## 2025-05-16 RX ORDER — TAMSULOSIN HYDROCHLORIDE 0.4 MG/1
0.4 CAPSULE ORAL AT BEDTIME
Refills: 0 | Status: DISCONTINUED | OUTPATIENT
Start: 2025-05-16 | End: 2025-05-17

## 2025-05-16 RX ORDER — DEXTROSE 50 % IN WATER 50 %
25 SYRINGE (ML) INTRAVENOUS ONCE
Refills: 0 | Status: DISCONTINUED | OUTPATIENT
Start: 2025-05-16 | End: 2025-05-17

## 2025-05-16 RX ORDER — INSULIN LISPRO 100 U/ML
INJECTION, SOLUTION INTRAVENOUS; SUBCUTANEOUS
Refills: 0 | Status: DISCONTINUED | OUTPATIENT
Start: 2025-05-16 | End: 2025-05-17

## 2025-05-16 RX ORDER — METFORMIN HYDROCHLORIDE 850 MG/1
1000 TABLET ORAL
Refills: 0 | Status: DISCONTINUED | OUTPATIENT
Start: 2025-05-16 | End: 2025-05-17

## 2025-05-16 RX ORDER — SODIUM CHLORIDE 9 G/1000ML
1000 INJECTION, SOLUTION INTRAVENOUS
Refills: 0 | Status: DISCONTINUED | OUTPATIENT
Start: 2025-05-16 | End: 2025-05-17

## 2025-05-16 RX ORDER — TAMSULOSIN HYDROCHLORIDE 0.4 MG/1
1 CAPSULE ORAL
Refills: 0 | DISCHARGE

## 2025-05-16 RX ORDER — SODIUM CHLORIDE 9 G/1000ML
1000 INJECTION, SOLUTION INTRAVENOUS
Refills: 0 | Status: DISCONTINUED | OUTPATIENT
Start: 2025-05-16 | End: 2025-05-16

## 2025-05-16 RX ORDER — SENNA 187 MG
2 TABLET ORAL
Qty: 30 | Refills: 0
Start: 2025-05-16 | End: 2025-05-30

## 2025-05-16 RX ORDER — TRAMADOL HYDROCHLORIDE 50 MG/1
1 TABLET, FILM COATED ORAL
Qty: 24 | Refills: 0
Start: 2025-05-16 | End: 2025-05-21

## 2025-05-16 RX ORDER — RELUGOLIX 120 MG/1
1 TABLET, FILM COATED ORAL
Refills: 0 | DISCHARGE

## 2025-05-16 RX ORDER — CEFAZOLIN SODIUM IN 0.9 % NACL 3 G/100 ML
2000 INTRAVENOUS SOLUTION, PIGGYBACK (ML) INTRAVENOUS EVERY 8 HOURS
Refills: 0 | Status: COMPLETED | OUTPATIENT
Start: 2025-05-16 | End: 2025-05-17

## 2025-05-16 RX ORDER — ONDANSETRON HCL/PF 4 MG/2 ML
4 VIAL (ML) INJECTION ONCE
Refills: 0 | Status: DISCONTINUED | OUTPATIENT
Start: 2025-05-16 | End: 2025-05-16

## 2025-05-16 RX ORDER — ACETAMINOPHEN 500 MG/5ML
2 LIQUID (ML) ORAL
Qty: 96 | Refills: 0
Start: 2025-05-16 | End: 2025-05-27

## 2025-05-16 RX ORDER — GLIMEPIRIDE 4 MG/1
1 TABLET ORAL
Refills: 0 | DISCHARGE

## 2025-05-16 RX ORDER — ONDANSETRON HCL/PF 4 MG/2 ML
4 VIAL (ML) INJECTION EVERY 6 HOURS
Refills: 0 | Status: DISCONTINUED | OUTPATIENT
Start: 2025-05-16 | End: 2025-05-17

## 2025-05-16 RX ORDER — CALCIUM CARBONATE/VITAMIN D3 500MG-5MCG
1 TABLET ORAL
Refills: 0 | DISCHARGE

## 2025-05-16 RX ORDER — POLYETHYLENE GLYCOL 3350 17 G/17G
17 POWDER, FOR SOLUTION ORAL AT BEDTIME
Refills: 0 | Status: DISCONTINUED | OUTPATIENT
Start: 2025-05-16 | End: 2025-05-17

## 2025-05-16 RX ORDER — ACETAMINOPHEN 500 MG/5ML
650 LIQUID (ML) ORAL ONCE
Refills: 0 | Status: COMPLETED | OUTPATIENT
Start: 2025-05-16 | End: 2025-05-16

## 2025-05-16 RX ORDER — TRAMADOL HYDROCHLORIDE 50 MG/1
50 TABLET, FILM COATED ORAL EVERY 4 HOURS
Refills: 0 | Status: DISCONTINUED | OUTPATIENT
Start: 2025-05-16 | End: 2025-05-17

## 2025-05-16 RX ORDER — MELOXICAM 15 MG/1
1 TABLET ORAL
Refills: 0 | DISCHARGE

## 2025-05-16 RX ORDER — DEXTROSE 50 % IN WATER 50 %
12.5 SYRINGE (ML) INTRAVENOUS ONCE
Refills: 0 | Status: DISCONTINUED | OUTPATIENT
Start: 2025-05-16 | End: 2025-05-17

## 2025-05-16 RX ORDER — SENNA 187 MG
2 TABLET ORAL AT BEDTIME
Refills: 0 | Status: DISCONTINUED | OUTPATIENT
Start: 2025-05-16 | End: 2025-05-17

## 2025-05-16 RX ORDER — SULFAMETHOXAZOLE AND TRIMETHOPRIM 800; 160 MG/1; MG/1
800-160 TABLET ORAL
Qty: 12 | Refills: 0 | Status: ACTIVE | COMMUNITY
Start: 2025-05-16 | End: 1900-01-01

## 2025-05-16 RX ORDER — ASPIRIN 325 MG
81 TABLET ORAL
Refills: 0 | Status: DISCONTINUED | OUTPATIENT
Start: 2025-05-16 | End: 2025-05-17

## 2025-05-16 RX ORDER — KETOROLAC TROMETHAMINE 30 MG/ML
15 INJECTION, SOLUTION INTRAMUSCULAR; INTRAVENOUS EVERY 6 HOURS
Refills: 0 | Status: DISCONTINUED | OUTPATIENT
Start: 2025-05-16 | End: 2025-05-17

## 2025-05-16 RX ORDER — HYDROMORPHONE/SOD CHLOR,ISO/PF 2 MG/10 ML
0.5 SYRINGE (ML) INJECTION
Refills: 0 | Status: DISCONTINUED | OUTPATIENT
Start: 2025-05-16 | End: 2025-05-16

## 2025-05-16 RX ORDER — GLUCAGON 3 MG/1
1 POWDER NASAL ONCE
Refills: 0 | Status: DISCONTINUED | OUTPATIENT
Start: 2025-05-16 | End: 2025-05-17

## 2025-05-16 RX ORDER — ASPIRIN 325 MG
1 TABLET ORAL
Qty: 60 | Refills: 0
Start: 2025-05-16 | End: 2025-06-14

## 2025-05-16 RX ORDER — DEXTROSE 50 % IN WATER 50 %
15 SYRINGE (ML) INTRAVENOUS ONCE
Refills: 0 | Status: DISCONTINUED | OUTPATIENT
Start: 2025-05-16 | End: 2025-05-17

## 2025-05-16 RX ORDER — LISINOPRIL 5 MG/1
10 TABLET ORAL DAILY
Refills: 0 | Status: DISCONTINUED | OUTPATIENT
Start: 2025-05-16 | End: 2025-05-17

## 2025-05-16 RX ORDER — ATORVASTATIN CALCIUM 80 MG/1
80 TABLET, FILM COATED ORAL AT BEDTIME
Refills: 0 | Status: DISCONTINUED | OUTPATIENT
Start: 2025-05-16 | End: 2025-05-17

## 2025-05-16 RX ORDER — MAGNESIUM HYDROXIDE 400 MG/5ML
30 SUSPENSION ORAL DAILY
Refills: 0 | Status: DISCONTINUED | OUTPATIENT
Start: 2025-05-16 | End: 2025-05-17

## 2025-05-16 RX ORDER — DAROLUTAMIDE 300 MG/1
2 TABLET, FILM COATED ORAL
Refills: 0 | DISCHARGE

## 2025-05-16 RX ORDER — ACETAMINOPHEN 500 MG/5ML
650 LIQUID (ML) ORAL EVERY 6 HOURS
Refills: 0 | Status: DISCONTINUED | OUTPATIENT
Start: 2025-05-16 | End: 2025-05-17

## 2025-05-16 RX ORDER — METFORMIN HYDROCHLORIDE 850 MG/1
500 TABLET ORAL
Refills: 0 | Status: DISCONTINUED | OUTPATIENT
Start: 2025-05-16 | End: 2025-05-17

## 2025-05-16 RX ORDER — LISINOPRIL 30 MG/1
1 TABLET ORAL
Refills: 0 | DISCHARGE

## 2025-05-16 RX ORDER — LISINOPRIL 30 MG/1
25 TABLET ORAL DAILY
Refills: 0 | Status: DISCONTINUED | OUTPATIENT
Start: 2025-05-16 | End: 2025-05-17

## 2025-05-16 RX ADMIN — Medication 2 TABLET(S): at 21:34

## 2025-05-16 RX ADMIN — Medication 1000 MILLIGRAM(S): at 08:31

## 2025-05-16 RX ADMIN — CELECOXIB 200 MILLIGRAM(S): 50 CAPSULE ORAL at 13:05

## 2025-05-16 RX ADMIN — Medication 1000 MILLIGRAM(S): at 13:05

## 2025-05-16 RX ADMIN — Medication 650 MILLIGRAM(S): at 14:20

## 2025-05-16 RX ADMIN — CELECOXIB 200 MILLIGRAM(S): 50 CAPSULE ORAL at 08:31

## 2025-05-16 RX ADMIN — Medication 650 MILLIGRAM(S): at 20:05

## 2025-05-16 RX ADMIN — POLYETHYLENE GLYCOL 3350 17 GRAM(S): 17 POWDER, FOR SOLUTION ORAL at 21:34

## 2025-05-16 RX ADMIN — KETOROLAC TROMETHAMINE 15 MILLIGRAM(S): 30 INJECTION, SOLUTION INTRAMUSCULAR; INTRAVENOUS at 16:49

## 2025-05-16 RX ADMIN — Medication 100 MILLIGRAM(S): at 18:30

## 2025-05-16 RX ADMIN — KETOROLAC TROMETHAMINE 15 MILLIGRAM(S): 30 INJECTION, SOLUTION INTRAMUSCULAR; INTRAVENOUS at 21:34

## 2025-05-16 RX ADMIN — KETOROLAC TROMETHAMINE 15 MILLIGRAM(S): 30 INJECTION, SOLUTION INTRAMUSCULAR; INTRAVENOUS at 16:34

## 2025-05-16 RX ADMIN — Medication 650 MILLIGRAM(S): at 14:48

## 2025-05-16 RX ADMIN — SODIUM CHLORIDE 120 MILLILITER(S): 9 INJECTION, SOLUTION INTRAVENOUS at 13:31

## 2025-05-16 RX ADMIN — KETOROLAC TROMETHAMINE 15 MILLIGRAM(S): 30 INJECTION, SOLUTION INTRAMUSCULAR; INTRAVENOUS at 22:00

## 2025-05-16 RX ADMIN — ATORVASTATIN CALCIUM 80 MILLIGRAM(S): 80 TABLET, FILM COATED ORAL at 21:34

## 2025-05-16 RX ADMIN — INSULIN LISPRO 2 UNIT(S): 100 INJECTION, SOLUTION INTRAVENOUS; SUBCUTANEOUS at 14:20

## 2025-05-16 RX ADMIN — Medication 650 MILLIGRAM(S): at 20:35

## 2025-05-16 RX ADMIN — TAMSULOSIN HYDROCHLORIDE 0.4 MILLIGRAM(S): 0.4 CAPSULE ORAL at 21:34

## 2025-05-16 RX ADMIN — Medication 75 MILLILITER(S): at 20:06

## 2025-05-16 RX ADMIN — Medication 81 MILLIGRAM(S): at 17:46

## 2025-05-16 RX ADMIN — METFORMIN HYDROCHLORIDE 500 MILLIGRAM(S): 850 TABLET ORAL at 21:34

## 2025-05-17 ENCOUNTER — TRANSCRIPTION ENCOUNTER (OUTPATIENT)
Age: 75
End: 2025-05-17

## 2025-05-17 VITALS
SYSTOLIC BLOOD PRESSURE: 103 MMHG | RESPIRATION RATE: 16 BRPM | DIASTOLIC BLOOD PRESSURE: 64 MMHG | OXYGEN SATURATION: 97 % | HEART RATE: 60 BPM | TEMPERATURE: 98 F

## 2025-05-17 LAB
ANION GAP SERPL CALC-SCNC: 11 MMOL/L — SIGNIFICANT CHANGE UP (ref 7–14)
BUN SERPL-MCNC: 21 MG/DL — HIGH (ref 10–20)
CALCIUM SERPL-MCNC: 8.5 MG/DL — SIGNIFICANT CHANGE UP (ref 8.4–10.5)
CHLORIDE SERPL-SCNC: 102 MMOL/L — SIGNIFICANT CHANGE UP (ref 98–110)
CO2 SERPL-SCNC: 23 MMOL/L — SIGNIFICANT CHANGE UP (ref 17–32)
CREAT SERPL-MCNC: 1.2 MG/DL — SIGNIFICANT CHANGE UP (ref 0.7–1.5)
EGFR: 64 ML/MIN/1.73M2 — SIGNIFICANT CHANGE UP
EGFR: 64 ML/MIN/1.73M2 — SIGNIFICANT CHANGE UP
GLUCOSE BLDC GLUCOMTR-MCNC: 218 MG/DL — HIGH (ref 70–99)
GLUCOSE BLDC GLUCOMTR-MCNC: 277 MG/DL — HIGH (ref 70–99)
GLUCOSE SERPL-MCNC: 254 MG/DL — HIGH (ref 70–99)
HCT VFR BLD CALC: 34.3 % — LOW (ref 42–52)
HGB BLD-MCNC: 11.8 G/DL — LOW (ref 14–18)
MCHC RBC-ENTMCNC: 31.3 PG — HIGH (ref 27–31)
MCHC RBC-ENTMCNC: 34.4 G/DL — SIGNIFICANT CHANGE UP (ref 32–37)
MCV RBC AUTO: 91 FL — SIGNIFICANT CHANGE UP (ref 80–94)
NRBC BLD AUTO-RTO: 0 /100 WBCS — SIGNIFICANT CHANGE UP (ref 0–0)
PLATELET # BLD AUTO: 188 K/UL — SIGNIFICANT CHANGE UP (ref 130–400)
PMV BLD: 9.6 FL — SIGNIFICANT CHANGE UP (ref 7.4–10.4)
POTASSIUM SERPL-MCNC: 4.3 MMOL/L — SIGNIFICANT CHANGE UP (ref 3.5–5)
POTASSIUM SERPL-SCNC: 4.3 MMOL/L — SIGNIFICANT CHANGE UP (ref 3.5–5)
RBC # BLD: 3.77 M/UL — LOW (ref 4.7–6.1)
RBC # FLD: 12.7 % — SIGNIFICANT CHANGE UP (ref 11.5–14.5)
SODIUM SERPL-SCNC: 136 MMOL/L — SIGNIFICANT CHANGE UP (ref 135–146)
WBC # BLD: 9.49 K/UL — SIGNIFICANT CHANGE UP (ref 4.8–10.8)
WBC # FLD AUTO: 9.49 K/UL — SIGNIFICANT CHANGE UP (ref 4.8–10.8)

## 2025-05-17 PROCEDURE — 99232 SBSQ HOSP IP/OBS MODERATE 35: CPT

## 2025-05-17 RX ADMIN — KETOROLAC TROMETHAMINE 15 MILLIGRAM(S): 30 INJECTION, SOLUTION INTRAMUSCULAR; INTRAVENOUS at 05:45

## 2025-05-17 RX ADMIN — INSULIN LISPRO 4: 100 INJECTION, SOLUTION INTRAVENOUS; SUBCUTANEOUS at 08:17

## 2025-05-17 RX ADMIN — Medication 40 MILLIGRAM(S): at 05:44

## 2025-05-17 RX ADMIN — TRAMADOL HYDROCHLORIDE 50 MILLIGRAM(S): 50 TABLET, FILM COATED ORAL at 10:58

## 2025-05-17 RX ADMIN — LISINOPRIL 10 MILLIGRAM(S): 5 TABLET ORAL at 05:44

## 2025-05-17 RX ADMIN — TRAMADOL HYDROCHLORIDE 50 MILLIGRAM(S): 50 TABLET, FILM COATED ORAL at 02:35

## 2025-05-17 RX ADMIN — Medication 650 MILLIGRAM(S): at 02:35

## 2025-05-17 RX ADMIN — Medication 81 MILLIGRAM(S): at 05:44

## 2025-05-17 RX ADMIN — TRAMADOL HYDROCHLORIDE 50 MILLIGRAM(S): 50 TABLET, FILM COATED ORAL at 02:05

## 2025-05-17 RX ADMIN — TRAMADOL HYDROCHLORIDE 50 MILLIGRAM(S): 50 TABLET, FILM COATED ORAL at 11:28

## 2025-05-17 RX ADMIN — Medication 650 MILLIGRAM(S): at 02:05

## 2025-05-17 RX ADMIN — KETOROLAC TROMETHAMINE 15 MILLIGRAM(S): 30 INJECTION, SOLUTION INTRAMUSCULAR; INTRAVENOUS at 05:30

## 2025-05-17 RX ADMIN — INSULIN LISPRO 6: 100 INJECTION, SOLUTION INTRAVENOUS; SUBCUTANEOUS at 12:24

## 2025-05-17 RX ADMIN — TRAMADOL HYDROCHLORIDE 50 MILLIGRAM(S): 50 TABLET, FILM COATED ORAL at 06:37

## 2025-05-17 RX ADMIN — Medication 650 MILLIGRAM(S): at 10:58

## 2025-05-17 RX ADMIN — TRAMADOL HYDROCHLORIDE 50 MILLIGRAM(S): 50 TABLET, FILM COATED ORAL at 06:52

## 2025-05-17 RX ADMIN — Medication 100 MILLIGRAM(S): at 02:05

## 2025-05-17 RX ADMIN — Medication 1 APPLICATION(S): at 05:45

## 2025-05-17 RX ADMIN — LISINOPRIL 25 MILLIGRAM(S): 30 TABLET ORAL at 05:44

## 2025-05-17 RX ADMIN — Medication 650 MILLIGRAM(S): at 11:28

## 2025-05-19 LAB — SURGICAL PATHOLOGY STUDY: SIGNIFICANT CHANGE UP

## 2025-05-20 DIAGNOSIS — I10 ESSENTIAL (PRIMARY) HYPERTENSION: ICD-10-CM

## 2025-05-20 DIAGNOSIS — Z86.73 PERSONAL HISTORY OF TRANSIENT ISCHEMIC ATTACK (TIA), AND CEREBRAL INFARCTION WITHOUT RESIDUAL DEFICITS: ICD-10-CM

## 2025-05-20 DIAGNOSIS — Z79.84 LONG TERM (CURRENT) USE OF ORAL HYPOGLYCEMIC DRUGS: ICD-10-CM

## 2025-05-20 DIAGNOSIS — Z85.830 PERSONAL HISTORY OF MALIGNANT NEOPLASM OF BONE: ICD-10-CM

## 2025-05-20 DIAGNOSIS — Z87.891 PERSONAL HISTORY OF NICOTINE DEPENDENCE: ICD-10-CM

## 2025-05-20 DIAGNOSIS — Z86.19 PERSONAL HISTORY OF OTHER INFECTIOUS AND PARASITIC DISEASES: ICD-10-CM

## 2025-05-20 DIAGNOSIS — E11.9 TYPE 2 DIABETES MELLITUS WITHOUT COMPLICATIONS: ICD-10-CM

## 2025-05-20 DIAGNOSIS — Z85.810 PERSONAL HISTORY OF MALIGNANT NEOPLASM OF TONGUE: ICD-10-CM

## 2025-05-20 DIAGNOSIS — M16.12 UNILATERAL PRIMARY OSTEOARTHRITIS, LEFT HIP: ICD-10-CM

## 2025-05-20 DIAGNOSIS — E66.9 OBESITY, UNSPECIFIED: ICD-10-CM

## 2025-05-20 DIAGNOSIS — I35.0 NONRHEUMATIC AORTIC (VALVE) STENOSIS: ICD-10-CM

## 2025-05-23 ENCOUNTER — NON-APPOINTMENT (OUTPATIENT)
Age: 75
End: 2025-05-23

## 2025-06-05 ENCOUNTER — APPOINTMENT (OUTPATIENT)
Facility: CLINIC | Age: 75
End: 2025-06-05
Payer: MEDICARE

## 2025-06-05 PROCEDURE — 73502 X-RAY EXAM HIP UNI 2-3 VIEWS: CPT

## 2025-06-05 PROCEDURE — 99024 POSTOP FOLLOW-UP VISIT: CPT

## 2025-06-16 ENCOUNTER — INPATIENT (INPATIENT)
Facility: HOSPITAL | Age: 75
LOS: 1 days | Discharge: HOME | DRG: 63 | End: 2025-06-18
Attending: INTERNAL MEDICINE | Admitting: FAMILY MEDICINE
Payer: MEDICARE

## 2025-06-16 VITALS
TEMPERATURE: 98 F | RESPIRATION RATE: 18 BRPM | OXYGEN SATURATION: 96 % | SYSTOLIC BLOOD PRESSURE: 176 MMHG | HEART RATE: 56 BPM | DIASTOLIC BLOOD PRESSURE: 84 MMHG

## 2025-06-16 DIAGNOSIS — D49.0 NEOPLASM OF UNSPECIFIED BEHAVIOR OF DIGESTIVE SYSTEM: Chronic | ICD-10-CM

## 2025-06-16 DIAGNOSIS — I63.9 CEREBRAL INFARCTION, UNSPECIFIED: ICD-10-CM

## 2025-06-16 DIAGNOSIS — Z98.890 OTHER SPECIFIED POSTPROCEDURAL STATES: Chronic | ICD-10-CM

## 2025-06-16 DIAGNOSIS — Z90.89 ACQUIRED ABSENCE OF OTHER ORGANS: Chronic | ICD-10-CM

## 2025-06-16 LAB
ALBUMIN SERPL ELPH-MCNC: 4.2 G/DL — SIGNIFICANT CHANGE UP (ref 3.5–5.2)
ALP SERPL-CCNC: 80 U/L — SIGNIFICANT CHANGE UP (ref 30–115)
ALT FLD-CCNC: 22 U/L — SIGNIFICANT CHANGE UP (ref 0–41)
ANION GAP SERPL CALC-SCNC: 14 MMOL/L — SIGNIFICANT CHANGE UP (ref 7–14)
APTT BLD: 33.8 SEC — SIGNIFICANT CHANGE UP (ref 27–39.2)
AST SERPL-CCNC: 65 U/L — HIGH (ref 0–41)
BASOPHILS # BLD AUTO: 0.05 K/UL — SIGNIFICANT CHANGE UP (ref 0–0.2)
BASOPHILS NFR BLD AUTO: 1 % — SIGNIFICANT CHANGE UP (ref 0–1)
BILIRUB SERPL-MCNC: 0.3 MG/DL — SIGNIFICANT CHANGE UP (ref 0.2–1.2)
BUN SERPL-MCNC: 18 MG/DL — SIGNIFICANT CHANGE UP (ref 10–20)
CALCIUM SERPL-MCNC: 9.3 MG/DL — SIGNIFICANT CHANGE UP (ref 8.4–10.5)
CHLORIDE SERPL-SCNC: 101 MMOL/L — SIGNIFICANT CHANGE UP (ref 98–110)
CO2 SERPL-SCNC: 21 MMOL/L — SIGNIFICANT CHANGE UP (ref 17–32)
CREAT SERPL-MCNC: 0.9 MG/DL — SIGNIFICANT CHANGE UP (ref 0.7–1.5)
EGFR: 91 ML/MIN/1.73M2 — SIGNIFICANT CHANGE UP
EGFR: 91 ML/MIN/1.73M2 — SIGNIFICANT CHANGE UP
EOSINOPHIL # BLD AUTO: 0.11 K/UL — SIGNIFICANT CHANGE UP (ref 0–0.7)
EOSINOPHIL NFR BLD AUTO: 2.1 % — SIGNIFICANT CHANGE UP (ref 0–8)
GLUCOSE SERPL-MCNC: 192 MG/DL — HIGH (ref 70–99)
HCT VFR BLD CALC: 37.9 % — LOW (ref 42–52)
HGB BLD-MCNC: 12.7 G/DL — LOW (ref 14–18)
IMM GRANULOCYTES NFR BLD AUTO: 1 % — HIGH (ref 0.1–0.3)
INR BLD: 0.91 RATIO — SIGNIFICANT CHANGE UP (ref 0.65–1.3)
LYMPHOCYTES # BLD AUTO: 1.05 K/UL — LOW (ref 1.2–3.4)
LYMPHOCYTES # BLD AUTO: 20.3 % — LOW (ref 20.5–51.1)
MCHC RBC-ENTMCNC: 31.3 PG — HIGH (ref 27–31)
MCHC RBC-ENTMCNC: 33.5 G/DL — SIGNIFICANT CHANGE UP (ref 32–37)
MCV RBC AUTO: 93.3 FL — SIGNIFICANT CHANGE UP (ref 80–94)
MONOCYTES # BLD AUTO: 0.59 K/UL — SIGNIFICANT CHANGE UP (ref 0.1–0.6)
MONOCYTES NFR BLD AUTO: 11.4 % — HIGH (ref 1.7–9.3)
NEUTROPHILS # BLD AUTO: 3.32 K/UL — SIGNIFICANT CHANGE UP (ref 1.4–6.5)
NEUTROPHILS NFR BLD AUTO: 64.2 % — SIGNIFICANT CHANGE UP (ref 42.2–75.2)
NRBC BLD AUTO-RTO: 0 /100 WBCS — SIGNIFICANT CHANGE UP (ref 0–0)
PLATELET # BLD AUTO: 224 K/UL — SIGNIFICANT CHANGE UP (ref 130–400)
PMV BLD: 8.9 FL — SIGNIFICANT CHANGE UP (ref 7.4–10.4)
POTASSIUM SERPL-MCNC: 6.2 MMOL/L — CRITICAL HIGH (ref 3.5–5)
POTASSIUM SERPL-SCNC: 6.2 MMOL/L — CRITICAL HIGH (ref 3.5–5)
PROT SERPL-MCNC: 6.8 G/DL — SIGNIFICANT CHANGE UP (ref 6–8)
PROTHROM AB SERPL-ACNC: 10.7 SEC — SIGNIFICANT CHANGE UP (ref 9.95–12.87)
RBC # BLD: 4.06 M/UL — LOW (ref 4.7–6.1)
RBC # FLD: 13.6 % — SIGNIFICANT CHANGE UP (ref 11.5–14.5)
SODIUM SERPL-SCNC: 136 MMOL/L — SIGNIFICANT CHANGE UP (ref 135–146)
WBC # BLD: 5.17 K/UL — SIGNIFICANT CHANGE UP (ref 4.8–10.8)
WBC # FLD AUTO: 5.17 K/UL — SIGNIFICANT CHANGE UP (ref 4.8–10.8)

## 2025-06-16 PROCEDURE — 70498 CT ANGIOGRAPHY NECK: CPT | Mod: 26

## 2025-06-16 PROCEDURE — 70496 CT ANGIOGRAPHY HEAD: CPT | Mod: 26

## 2025-06-16 PROCEDURE — 93306 TTE W/DOPPLER COMPLETE: CPT

## 2025-06-16 PROCEDURE — 70450 CT HEAD/BRAIN W/O DYE: CPT

## 2025-06-16 PROCEDURE — 83735 ASSAY OF MAGNESIUM: CPT

## 2025-06-16 PROCEDURE — 87641 MR-STAPH DNA AMP PROBE: CPT

## 2025-06-16 PROCEDURE — G0427: CPT | Mod: 2W

## 2025-06-16 PROCEDURE — 92610 EVALUATE SWALLOWING FUNCTION: CPT | Mod: GN

## 2025-06-16 PROCEDURE — 70450 CT HEAD/BRAIN W/O DYE: CPT | Mod: 26,59

## 2025-06-16 PROCEDURE — 85025 COMPLETE CBC W/AUTO DIFF WBC: CPT

## 2025-06-16 PROCEDURE — 0042T: CPT

## 2025-06-16 PROCEDURE — 97165 OT EVAL LOW COMPLEX 30 MIN: CPT | Mod: GO

## 2025-06-16 PROCEDURE — 97162 PT EVAL MOD COMPLEX 30 MIN: CPT | Mod: GP

## 2025-06-16 PROCEDURE — 87640 STAPH A DNA AMP PROBE: CPT

## 2025-06-16 PROCEDURE — 93010 ELECTROCARDIOGRAM REPORT: CPT

## 2025-06-16 PROCEDURE — 99291 CRITICAL CARE FIRST HOUR: CPT

## 2025-06-16 PROCEDURE — 70551 MRI BRAIN STEM W/O DYE: CPT

## 2025-06-16 PROCEDURE — 36415 COLL VENOUS BLD VENIPUNCTURE: CPT

## 2025-06-16 PROCEDURE — 80053 COMPREHEN METABOLIC PANEL: CPT

## 2025-06-16 RX ORDER — NICARDIPINE HCL 30 MG
5 CAPSULE ORAL
Qty: 40 | Refills: 0 | Status: DISCONTINUED | OUTPATIENT
Start: 2025-06-16 | End: 2025-06-18

## 2025-06-16 RX ORDER — TENECTEPLASE 50 MG
25 KIT INTRAVENOUS ONCE
Refills: 0 | Status: COMPLETED | OUTPATIENT
Start: 2025-06-16 | End: 2025-06-16

## 2025-06-16 RX ORDER — LABETALOL HYDROCHLORIDE 200 MG/1
10 TABLET, FILM COATED ORAL ONCE
Refills: 0 | Status: COMPLETED | OUTPATIENT
Start: 2025-06-16 | End: 2025-06-16

## 2025-06-16 RX ADMIN — LABETALOL HYDROCHLORIDE 10 MILLIGRAM(S): 200 TABLET, FILM COATED ORAL at 18:05

## 2025-06-16 RX ADMIN — Medication 25 MG/HR: at 19:53

## 2025-06-16 RX ADMIN — Medication 10 MILLILITER(S): at 17:13

## 2025-06-16 RX ADMIN — TENECTEPLASE 3600 MILLIGRAM(S): KIT at 17:13

## 2025-06-16 RX ADMIN — LABETALOL HYDROCHLORIDE 10 MILLIGRAM(S): 200 TABLET, FILM COATED ORAL at 17:39

## 2025-06-16 NOTE — ED PROVIDER NOTE - CRITICAL CARE ATTENDING CONTRIBUTION TO CARE
Patient brought to the ED for difficulty speaking.  History obtained from spouse.  According to her, the patient was speaking normally at 1:45 PM today via a telephone conversation with her.  At 3 PM, the patient's daughter noted the patient seemed confused and his speech was not making sense.  There is no recent history of trauma, fever, rash.  Patient denies headache, change in vision, change in ambulatory function.  His past medical history includes hip replacement 4 weeks ago.  He has a history of prostate with mets to the bone.  He admits to occasional blood on the toilet paper when he cleans after stooling.  He is not on anticoagulation.  He does take aspirin on a daily basis.  Vital signs noted.  No apparent distress.  NCAT.  Pupils equal.  Neck supple.  No bruit.  Chest clear.  Heart regular rate no murmur.  Abdomen nontender.  Extremity equal pulses.  Neuro see NIH stroke scale.  Stroke code was activated soon after arrival.  I accompanied the patient to the CT suite.  I viewed CT images in real-time.  I saw no hemorrhage.  This stated my medical decision making.  Because the patient was a thrombolytic candidate, he was transported back to the ED immediately after the dry CT was performed.  Telestroke called.  Telestroke PA felt consultation of the telestroke neurologist was necessary in view of the patient's history of surgery 4 weeks ago.  A video examination was performed by telestroke.  Risks and benefits were discussed by me and by telestroke with the spouse and the patient.  Patient and spouse were agreeable to the administration of thrombolytic therapy.  During the course of the determination of eligibility, the patient developed mild headache.  Telestroke was aware of this and recommended proceeding with thrombolytic therapy.  TNK was administered at 1713.  Telestroke recommended plain CT head to be performed when the patient was obtaining CTAs.    I personally saw the patient. BRAYAN Ortega and I provided critical care for a total of 60 minutes. I provided a substantive portion of the care and the majority of the critical care time. Patient brought to the ED for difficulty speaking.  History obtained from spouse.  According to her, the patient was speaking normally at 1:45 PM today via a telephone conversation with her.  At 3 PM, the patient's daughter noted the patient seemed confused and his speech was not making sense.  There is no recent history of trauma, fever, rash.  Patient denies headache, change in vision, change in ambulatory function.  His past medical history includes hip replacement 4 weeks ago.  He has a history of prostate with mets to the bone.  He admits to occasional blood on the toilet paper when he cleans after stooling.  He is not on anticoagulation.  He does take aspirin on a daily basis.  Vital signs noted.  No apparent distress.  NCAT.  Pupils equal.  Neck supple.  No bruit.  Chest clear.  Heart regular rate no murmur.  Abdomen nontender.  Extremity equal pulses.  Neuro see NIH stroke scale.  Stroke code was activated soon after arrival.  I accompanied the patient to the CT suite.  I viewed CT images in real-time.  I saw no hemorrhage.  This stated my medical decision making.  Because the patient was a thrombolytic candidate, he was transported back to the ED immediately after the dry CT was performed.  Telestroke called.  Telestroke PA felt consultation of the telestroke neurologist was necessary in view of the patient's history of surgery 4 weeks ago.  A video examination was performed by telestroke.  Risks and benefits were discussed by me and by telestroke with the spouse and the patient.  Patient and spouse were agreeable to the administration of thrombolytic therapy.  During the course of the determination of eligibility, the patient developed mild headache.  Telestroke was aware of this and recommended proceeding with thrombolytic therapy.  TNK was administered at 1713.  Telestroke recommended plain CT head to be performed when the patient was obtaining CTAs.    Thrombolytic Pre/Post Administration Checklist  (Complete checklist prior to any administration of thrombolytics)  ___     Correct patient name and date of birth.  ___	Actual patient weight (in kg) confirmed.   ___	Dose of tPA to be administered confirmed by two clinicians. This verification is documented in the MAR by tasking provider administering the bolus through worklist manager  ___	Verify no history of intracranial hemorrhage or evidence of intracranial hemorrhage on CT.  ___	Verify no anticoagulant use:  •	Patient has not taken a direct-acting oral anticoagulant (DOAC) in past 48 hours.  •	If one has been taken between 3 and 48 hours, have coagulation studies been confirmed and all results are normal?  •	If patient is on warfarin, is INR < 1.7?  ___	Last known well time is within 4.5 hours.  ___    The risks and benefits of administering thrombolytics are discussed with the patient and/or family, and the discussion is documented in the ED provider note.  ___	The physician ordering the thrombolytic announces the time of the bolus and the patient’s current BP at the time of the bolus.  The nurse verbally acknowledges.  This is documented in the ED provider note and the ED Stroke Flowsheet.  ___	Dysphagia screen completed and prior to ANY PO medication administration   ___    Post thrombolytic Normal Saline infusion ordered and initiated when applicable.      I personally saw the patient. BRAYAN Ortega and I provided critical care for a total of 60 minutes. I provided a substantive portion of the care and the majority of the critical care time.

## 2025-06-16 NOTE — H&P ADULT - NSHPLABSRESULTS_GEN_ALL_CORE
12.7   5.17  )-----------( 224      ( 16 Jun 2025 15:55 )             37.9       06-16    136  |  101  |  18  ----------------------------<  192[H]  6.2[HH]   |  21  |  0.9    Ca    9.3      16 Jun 2025 15:55    TPro  6.8  /  Alb  4.2  /  TBili  0.3  /  DBili  x   /  AST  65[H]  /  ALT  22  /  AlkPhos  80  06-16              Urinalysis Basic - ( 16 Jun 2025 15:55 )    Color: x / Appearance: x / SG: x / pH: x  Gluc: 192 mg/dL / Ketone: x  / Bili: x / Urobili: x   Blood: x / Protein: x / Nitrite: x   Leuk Esterase: x / RBC: x / WBC x   Sq Epi: x / Non Sq Epi: x / Bacteria: x        PT/INR - ( 16 Jun 2025 15:55 )   PT: 10.70 sec;   INR: 0.91 ratio         PTT - ( 16 Jun 2025 15:55 )  PTT:33.8 sec    Lactate Trend            CAPILLARY BLOOD GLUCOSE      POCT Blood Glucose.: 196 mg/dL (16 Jun 2025 16:11)        CT angio/perfusion/brain/neck    IMPRESSION:    CT PERFUSION:  The perfusion scan demonstrates 30 mL perfusion abnormalities in the   region of the left temporal occipital region core infarct.    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    Intracranial vessels are otherwise patent and unremarkable    No carotid or vertebral artery stenosis.      CT brain stroke protocol    IMPRESSION:  No CT evidence of large acute territorial infarct or acute intracranial   pathology.    Chronic appearing left cerebellar infarct, new since the prior   examination.    These findings were discussed with BRAYAN TRIANA [phone] at   6/16/2025 4:34 PM by Dr. Liu with read back confirmation.

## 2025-06-16 NOTE — ED PROVIDER NOTE - CLINICAL SUMMARY MEDICAL DECISION MAKING FREE TEXT BOX
Patient presented with aphasia stroke code activated received thrombolytics subsequently with headache repeat head CT negative for bleed blood pressure labile placed on Cardene drip for admission ICU

## 2025-06-16 NOTE — ED PROVIDER NOTE - NSICDXPASTMEDICALHX_GEN_ALL_CORE_FT
PAST MEDICAL HISTORY:  DM2 (diabetes mellitus, type 2)     Former smoker     History of aortic stenosis     History of hepatitis C     History of TIAs     HTN (hypertension)     OA (osteoarthritis)     Obesity (BMI 30-39.9)     Prostate cancer     Prostate cancer metastatic to bone     Squamous cell cancer of tongue

## 2025-06-16 NOTE — H&P ADULT - ASSESSMENT
Stroke  HTN  DM  Prostate CA      Pt w acute aphasia treated w TNK, Will monitor w post TNK, protocol.  In the ed pt reportedly had change in MS pst TNK and repeat ct head performed, no bleed noted.  Moniotr BP and maintain sbp ~165.  Neuro eval.  Speech and swallow eval. MRI brain. Check ua to eval for possible uti in setting of prostate ca.      CNS: neuro eval, MRI brain, speech and swallow eval    HEENT: oral care    Pulm: supplemental O2 prn    Cardiovascular: maintain sbp ~165    ID: monitor off abx    GI: NPO until speech and swallow eval    Renal: monitor uo, check ua,     Hematology: Post tnk protocol    Musculoskeletal: PT eval      Critical care time: 50 minutes

## 2025-06-16 NOTE — ED PROVIDER NOTE - OBJECTIVE STATEMENT
74-year-old male past medical history diabetes, aortic stenosis, history of TIAs, hypertension presents to the emergency department for confusion and noticed that speech was not making sense   Last known normal was at 1:45 PM via conversation with daughter.  Stroke code activated.

## 2025-06-16 NOTE — ED ADULT NURSE NOTE - NSFALLUNIVINTERV_ED_ALL_ED
Bed/Stretcher in lowest position, wheels locked, appropriate side rails in place/Call bell, personal items and telephone in reach/Instruct patient to call for assistance before getting out of bed/chair/stretcher/Non-slip footwear applied when patient is off stretcher/[place] to call system/Physically safe environment - no spills, clutter or unnecessary equipment/Purposeful proactive rounding/Room/bathroom lighting operational, light cord in reach

## 2025-06-16 NOTE — ED PROVIDER NOTE - NIH STROKE SCALE: 5A. MOTOR ARM, LEFT, QM
[Home] : at home, [unfilled] , at the time of the visit. [Medical Office: ([facility])___] : at the medical office located in  [Verbal consent obtained from patient] : the patient, [unfilled] [TextBox_4] : Ms. HORTON is a 36 year female with a history of dextrocardia, pneumothorax on the left lung (2015)), VATS pleurodesis (2015), reactive airways growing up (?asthma), receiving IVF for infertility, who now comes in via video call for a follow up pulmonary evaluation. Her chief complaint is\par - she notes everyone at her home have COVID\par - she notes testing positive on 7/11\par - she notes her sx started 7/10\par - she notes congested nose, sore throat, cough \par - she notes being fatigued \par - she notes being congested \par - she denies having fever\par - she notes her baby is 3 and half months\par - she notes she is breast feeding \par \par \par - She denies any headaches, nausea, vomiting, fever, chills, sweats, chest pain, chest pressure, palpitations, SOB, wheezing, diarrhea, constipation, dysphagia, myalgias, dizziness, leg swelling, leg pain, itchy eyes, itchy ears, heartburn, reflux, or sour taste in the mouth  (0) No drift; limb holds 90 (or 45) degrees for full 10 secs

## 2025-06-16 NOTE — ED PROVIDER NOTE - PHYSICAL EXAMINATION
Physical Exam    Vital Signs: I have reviewed the initial vital signs.  Constitutional: appears stated age, no acute distress  Eyes: Conjunctiva pink, Sclera clear,  Cardiovascular: S1 and S2, regular rate, regular rhythm, well-perfused extremities, radial pulses equal and 2+, pedal pulses 2+ and equal  Respiratory: unlabored respiratory effort, clear to auscultation bilaterally no wheezing, rales and rhonchi  Gastrointestinal: soft, non-tender abdomen, no pulsatile mass, normal bowl sounds  Musculoskeletal: supple neck, no lower extremity edema, no midline tenderness  Integumentary: warm, dry, no rash  Neurologic: Patient confused, has mild to moderate aphasia, + tactile extinction.  NIH 4

## 2025-06-16 NOTE — STROKE CODE NOTE - ASSESSMENT/PLAN
Discussion with: Case was discussed with Dr. Jarrett via audio consult on 6/16/2025 at 4:15 pm. Patient was seen over televideo on 6/16/2025 at 4:35 pm    HISTORY OF PRESENT ILLNESS:  Patient is a 73 y/o M with a PMHx of DM, HTN, prostate CA with mets to the bone (on hormonal therapy), s/p left hip surgery 4 weeks ago, who p/w an acute onset of aphasia. Patient was at his baseline at 1:45 pm today. At around 3 pm, the patient's daughter noticed that he appeared confused an had trouble expressing himself. Upon arrival to the ED, patient had an initial NIHSS of 4 for aphasia and trouble answering questions.       PMH/PSH is PAST MEDICAL & SURGICAL HISTORY:  HTN (hypertension)      DM2 (diabetes mellitus, type 2)      Squamous cell cancer of tongue      Prostate cancer      OA (osteoarthritis)      History of TIAs      History of aortic stenosis      Prostate cancer metastatic to bone      Former smoker      Obesity (BMI 30-39.9)      History of hepatitis C      Neoplasm of tongue      H/O knee surgery      History of tonsillectomy        Pre-stroke MRS=      *Modified [place] Score   0 - No symptoms.      OUTSIDE HOSPITAL COURSE:      HOME MEDICATIONS:  Home Medications:  atenolol 25 mg oral tablet: 1 tab(s) orally once a day (16 May 2025 08:46)  Caltrate 600+D oral tablet, chewable: 1 tab(s) chewed once a day (16 May 2025 08:46)  cholecalciferol 125 mcg (5000 intl units) oral tablet: 1 tab(s) orally once a day (16 May 2025 08:46)  glimepiride 2 mg oral tablet: 1 tab(s) orally once a day (16 May 2025 08:46)  lisinopril 10 mg oral tablet: 1 tab(s) orally once a day (16 May 2025 08:46)  meloxicam 15 mg oral tablet: 1 tab(s) orally once a day (16 May 2025 08:46)  metFORMIN 500 mg oral tablet: 1 tab(s) orally 2 in the am, 1 in the pm (16 May 2025 08:46)  Nubeqa 300 mg oral tablet: 2 tab(s) orally 2 times a day (16 May 2025 08:46)  Orgovyx 120 mg oral tablet: 1 tab(s) orally once a day (16 May 2025 08:46)  tamsulosin 0.4 mg oral capsule: 1 cap(s) orally once a day (16 May 2025 08:46)  Trulicity Pen 0.75 mg/0.5 mL subcutaneous solution: 0.75 milligram(s) subcutaneously once a week on mondays (16 May 2025 08:46)        SMOKING/ETOH/RECREATIONAL DRUGS:      VITALS/DATA/ORDERS: [x] Reviewed    CT Head No Cont (06.16.25 @ 18:34)     FINDINGS:    The ventricles and sulci are unremarkable. There is no hydrocephalus.    Redemonstrated focal hypodensity noted within the left cerebellar   hemisphere, unchanged.    There is no evidence for acute intracranial hemorrhage, mass effect or   midline shift. There are no extra-axial fluid collections. The visualized   intraorbital contents are normal. There is mucosal thickening of the left   frontal, sphenoid, and bilateral ethmoid sinuses. The mastoid air cells   are aerated. The visualized soft tissues and osseous structures appear   normal.      IMPRESSION:  No significant change since recent same day head CT.    Noacute intracranial pathology.    Chronic appearing left cerebellar infarct.    Communication: The summary of above findings were discussed with readback   confirmation with Dr. Cervantes by resident Jose Coyne MD on 6/16/2025 at   6:49 PM.    CT Angio Brain Stroke Protocol  w/ IV Cont (06.16.25 @ 18:34)     CT PERFUSION:    CBF<30%: 0 mL  Tmax>6sec: 30 mL in the region of the left occipital lobe.  Mismatch volume: 30 mL  Mismatch ratio: Infinite    Per RAPID assessment for acute infarct, threshold for ischemic tissue   volume is Tmax > 6 sec.  Threshold for infarct core volume estimate is CBF < 30 percent.  Threshold for poor collateral flow or severe delayed perfusion is Tmax >   10 sec.      CTA HEAD/NECK:    AORTIC ARCH: Mild atherosclerotic plaques without flow-limiting stenosis.    RIGHT ANTERIOR CIRCULATION:  Common carotid artery: No stenosis.  External carotid artery: No stenosis.  Internal carotid artery:  -Extracranial: Calcified plaque at the carotid bulb without stenosis.  -Intracranial: Calcified plaque at the carotid siphon resulting in mild   stenosis of the supraclinoid segment.    Anterior cerebral artery: No stenosis.  Middle cerebral artery: There is a 2.5 mm saccular aneurysm noted arising   from the right distal M1 segment of the MCA, series 307 image 43..      LEFT ANTERIOR CIRCULATION:  Common carotid artery: Noncalcified plaque of the proximal common carotid   artery without stenosis.  External carotid artery: No stenosis.  Internal carotid artery:  -Extracranial: Calcified plaque at the carotid bulb without stenosis.  -Intracranial: Calcified plaque at the carotid siphon without stenosis.    Anterior cerebral artery: No stenosis.  Middle cerebral artery: No stenosis.      POSTERIOR CIRCULATION:  Right vertebral artery: Calcified plaque at the V4 segment without   stenosis.  Left vertebral artery: No stenosis. Terminates as the left PICA.  Basilar artery: No stenosis.  Proximal cerebellar arteries: No stenosis.    Posterior cerebral arteries: No stenosis.      Dural venous sinuses or deep cerebral veins:  No filling defects of the dural venous sinuses or deep cerebral veins.    IMPRESSION:    CT PERFUSION:  The perfusion scan demonstrates 30 mL perfusion abnormalities in the   region of the left temporal occipital region core infarct.    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    Intracranial vessels are otherwise patent and unremarkable    No carotid or vertebral artery stenosis.            Labs:  CAPILLARY BLOOD GLUCOSE      POCT Blood Glucose.: 196 mg/dL (16 Jun 2025 16:11)    Platelet Count - Automated: 224 K/uL (06-16-25 @ 15:55)    PT/INR - ( 16 Jun 2025 15:55 )   PT: 10.70 sec;   INR: 0.91 ratio         PTT - ( 16 Jun 2025 15:55 )  PTT:33.8 sec    EXAMINATION: Assisted by nurse Dorado  NIHSS:  1A: Level of consciousness       0= Alert; keenly responsive    1B: Ask month and age         +2= 0 questions right    1C: "Blink eyes" and "Squeeze Hands"         +1= Performs 1 task        2: Horizontal EOMs       0= Normal       3: Visual fields       0= No visual loss       4: Facial palsy (use grimace if obtunded)       0= Normal symmetry    5A: Left arm motor drift (count out loud and use fingers to show count)       0= No drift x 10 seconds      5B: Right arm motor drift       0= No drift x 10 seconds      6A: Left leg motor drift       +2= Some effort against gravity    6B: Right leg motor drift       0= No drift x 5 seconds      7: Limb ataxia (FNF/heel-shin)       0= Does not understand       8: Sensation       0= Normal, no sensory loss       9: Language/aphasia- describe the scene (on chandrakant); name the items; read the sentences (on chandrakant)       +2= Severe aphasia: fragmentary expression, inference needed, cannot identify materials       10: Dysarthria- read the words       0= Normal       11: Extinction/inattention       0= No abnormality  NIHSS of 7  NEUROLOGIC EXAMINATION  Patient is awake and alert. Aphasic. Cannot describe a picture and cannot name any objects. Follows 1 out of 2 simple commands. No obvious dysarthria. EOMI. Visual fields are intact to finger counting. No facial weakness. No UE drifts. Unable to lift the left leg due to recent hip surgery but moves it in bed. No RLE drift. Sensation is intact to light touch b/l. Unable to test for ataxia as patient does not understand the command.      IV Alteplase CONTRAINDICATIONS  [x] None    ABSOLUTE EXCLUSION CRITERIA:  [] Patient outside of the appropriate time window for IV alteplase  [] Evidence of intracranial hemorrhage on pretreatment CT scan (CT must be read by an attending radiologist or attending neurologist)  [] Head CT findings suggesting an established acute cerebral infarction as evidenced by dajuan hypodensity, regardless of size.  [] Clinical presentation consistent with subarachnoid hemorrhage, even with normal CT scan  [] Active internal bleeding  [] Known bleeding diathesis (including but not limited to platelet count < 100,000, use of oral anticoagulants with INR>1.7, use of full dose low molecular       weight heparin within the last 24 hours, use of unfractionated heparin AND a prolonged PTT (> 40sec), use of direct thrombin inhibitor (e.g. dabigatran) or oral direct Factor Xa inhibitor (e.g. rivaroxiban, apixaban) within 48 hours)  [] Systolic pressure >= 185 mmHg or diastolic pressure 110 mmHg on repeated measurements at the time treatment is to begin  [] Care team unable to determine eligibility for IV alteplase  [] Patient, family, or surrogate declines and understands risks and benefits of treatment.  [] For wake-up Protocol patients: DW-MRI lesions larger than one-third of the MCA territory      RELATIVE EXCLUSION CRITERIA    [] Isolated neurological deficits (except for aphasia or hemianopsia)  [] stroke severity too mild (non-disabling)  [] Seizure at onset with post-ictal residual neurological impairment  [] Gastrointestinal, genitourinary or respiratory hemorrhage within 21 days   [] Major surgery within 14 days   [] Blood glucose level < 50 or > 400 mG/dL  [] CPR with chest compressions or minor surgery (including liver and kidney biopsy, thoracocentesis, lumbar puncture) within 10 days   [] Arterial puncture at non-compressible site within 7 days   [] Evidence of acute trauma (fracture)   [] Diabetic hemorrhagic retinopathy or ophthalmic bleeding  [] Pregnancy or peripartum; no nursing post- treatment  [] Post-myocardial infarction pericarditis  [] Peritoneal dialysis or hemodialysis or sever hepatic disease   [] Known bacterial endocarditis   [] Life expectancy less than 6 months or sever co-morbid illness   [] Known cerebral vascular malformation, untreated intracranial aneurysm or brain tumor (may consider IV alteplase in patients with CNS lesions that have a very low likelihood of hemorrhage, such as small un-ruptured aneurysms or benign tumors with low vascularity.  [] Social/B[place]ai reason  [x] Other- hip replacement surgery 4 weeks ago      RISKS/BENEFITS DISCUSSION:  The risks and benefits of IV TNK administration were discussed with patient/family in presence of OSH staff.    ASSESSMENT: Patient is a 73 y/o M with a PMHx of DM, HTN, prostate CA with mets to the bone (on hormonal therapy), s/p left hip surgery 4 weeks ago, who p/w an acute onset of aphasia, LKW at 1:45 pm today. On exam over televideo, patient had an NIHSS of 7 for severe aphasia, trouble answering questions, following 1 out of 2 commands, and trouble lifting the LLE (likely due to recent hip surgery). CTH read no acute infarct or hemorrhage.  I discussed the risks and benefits of IV TNK with patient's wife at bedside who verbalized understanding and asked for the TNK to be administered. TNK was given at 17:13 on 6/16/2025. A CTA was obtained post TNK administration which did not read an LVO. There was a noted  2.5 mm saccular aneurysm noted arising from the right distal M1 segment of the MCA. CTP read a 30 mL perfusion abnormalities in the region of the left temporal occipital region. Patient was not a neuro IR thrombectomy candidate as there was no LVO on vessel imaging.        RECOMMENDATIONS:  [] s/p IV TNK, post TNK monitoring as per protocol and stroke workup      Plan discussed with Telestroke attending DR. Christopher Carlton      Consultation provided via live, two-way video streaming. Consultation provided in patient's preferred language. Discussion with: Case was discussed with Dr. Jarrett via audio consult on 6/16/2025 at 4:15 pm. Patient was seen over televideo on 6/16/2025 at 4:35 pm    HISTORY OF PRESENT ILLNESS:  Patient is a 75 y/o M with a PMHx of DM, HTN, prostate CA with mets to the bone (on hormonal therapy), s/p left hip surgery 4 weeks ago, who p/w an acute onset of aphasia. Patient was at his baseline at 1:45 pm today. At around 3 pm, the patient's daughter noticed that he appeared confused an had trouble expressing himself. Upon arrival to the ED, patient had an initial NIHSS of 4 for aphasia and trouble answering questions.       PMH/PSH is PAST MEDICAL & SURGICAL HISTORY:  HTN (hypertension)      DM2 (diabetes mellitus, type 2)      Squamous cell cancer of tongue      Prostate cancer      OA (osteoarthritis)      History of TIAs      History of aortic stenosis      Prostate cancer metastatic to bone      Former smoker      Obesity (BMI 30-39.9)      History of hepatitis C      Neoplasm of tongue      H/O knee surgery      History of tonsillectomy        Pre-stroke MRS=      *Modified [place] Score   0 - No symptoms.      OUTSIDE HOSPITAL COURSE:      HOME MEDICATIONS:  Home Medications:  atenolol 25 mg oral tablet: 1 tab(s) orally once a day (16 May 2025 08:46)  Caltrate 600+D oral tablet, chewable: 1 tab(s) chewed once a day (16 May 2025 08:46)  cholecalciferol 125 mcg (5000 intl units) oral tablet: 1 tab(s) orally once a day (16 May 2025 08:46)  glimepiride 2 mg oral tablet: 1 tab(s) orally once a day (16 May 2025 08:46)  lisinopril 10 mg oral tablet: 1 tab(s) orally once a day (16 May 2025 08:46)  meloxicam 15 mg oral tablet: 1 tab(s) orally once a day (16 May 2025 08:46)  metFORMIN 500 mg oral tablet: 1 tab(s) orally 2 in the am, 1 in the pm (16 May 2025 08:46)  Nubeqa 300 mg oral tablet: 2 tab(s) orally 2 times a day (16 May 2025 08:46)  Orgovyx 120 mg oral tablet: 1 tab(s) orally once a day (16 May 2025 08:46)  tamsulosin 0.4 mg oral capsule: 1 cap(s) orally once a day (16 May 2025 08:46)  Trulicity Pen 0.75 mg/0.5 mL subcutaneous solution: 0.75 milligram(s) subcutaneously once a week on mondays (16 May 2025 08:46)        SMOKING/ETOH/RECREATIONAL DRUGS:      VITALS/DATA/ORDERS: [x] Reviewed    CT Head No Cont (06.16.25 @ 18:34)     FINDINGS:    The ventricles and sulci are unremarkable. There is no hydrocephalus.    Redemonstrated focal hypodensity noted within the left cerebellar   hemisphere, unchanged.    There is no evidence for acute intracranial hemorrhage, mass effect or   midline shift. There are no extra-axial fluid collections. The visualized   intraorbital contents are normal. There is mucosal thickening of the left   frontal, sphenoid, and bilateral ethmoid sinuses. The mastoid air cells   are aerated. The visualized soft tissues and osseous structures appear   normal.      IMPRESSION:  No significant change since recent same day head CT.    Noacute intracranial pathology.    Chronic appearing left cerebellar infarct.    Communication: The summary of above findings were discussed with readback   confirmation with Dr. Cervantes by resident oJse Coyne MD on 6/16/2025 at   6:49 PM.    CT Angio Brain Stroke Protocol  w/ IV Cont (06.16.25 @ 18:34)     CT PERFUSION:    CBF<30%: 0 mL  Tmax>6sec: 30 mL in the region of the left occipital lobe.  Mismatch volume: 30 mL  Mismatch ratio: Infinite    Per RAPID assessment for acute infarct, threshold for ischemic tissue   volume is Tmax > 6 sec.  Threshold for infarct core volume estimate is CBF < 30 percent.  Threshold for poor collateral flow or severe delayed perfusion is Tmax >   10 sec.      CTA HEAD/NECK:    AORTIC ARCH: Mild atherosclerotic plaques without flow-limiting stenosis.    RIGHT ANTERIOR CIRCULATION:  Common carotid artery: No stenosis.  External carotid artery: No stenosis.  Internal carotid artery:  -Extracranial: Calcified plaque at the carotid bulb without stenosis.  -Intracranial: Calcified plaque at the carotid siphon resulting in mild   stenosis of the supraclinoid segment.    Anterior cerebral artery: No stenosis.  Middle cerebral artery: There is a 2.5 mm saccular aneurysm noted arising   from the right distal M1 segment of the MCA, series 307 image 43..      LEFT ANTERIOR CIRCULATION:  Common carotid artery: Noncalcified plaque of the proximal common carotid   artery without stenosis.  External carotid artery: No stenosis.  Internal carotid artery:  -Extracranial: Calcified plaque at the carotid bulb without stenosis.  -Intracranial: Calcified plaque at the carotid siphon without stenosis.    Anterior cerebral artery: No stenosis.  Middle cerebral artery: No stenosis.      POSTERIOR CIRCULATION:  Right vertebral artery: Calcified plaque at the V4 segment without   stenosis.  Left vertebral artery: No stenosis. Terminates as the left PICA.  Basilar artery: No stenosis.  Proximal cerebellar arteries: No stenosis.    Posterior cerebral arteries: No stenosis.      Dural venous sinuses or deep cerebral veins:  No filling defects of the dural venous sinuses or deep cerebral veins.    IMPRESSION:    CT PERFUSION:  The perfusion scan demonstrates 30 mL perfusion abnormalities in the   region of the left temporal occipital region core infarct.    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    Intracranial vessels are otherwise patent and unremarkable    No carotid or vertebral artery stenosis.            Labs:  CAPILLARY BLOOD GLUCOSE      POCT Blood Glucose.: 196 mg/dL (16 Jun 2025 16:11)    Platelet Count - Automated: 224 K/uL (06-16-25 @ 15:55)    PT/INR - ( 16 Jun 2025 15:55 )   PT: 10.70 sec;   INR: 0.91 ratio         PTT - ( 16 Jun 2025 15:55 )  PTT:33.8 sec    EXAMINATION: Assisted by nurse Dorado  NIHSS:  1A: Level of consciousness       0= Alert; keenly responsive    1B: Ask month and age         +2= 0 questions right    1C: "Blink eyes" and "Squeeze Hands"         +1= Performs 1 task        2: Horizontal EOMs       0= Normal       3: Visual fields       0= No visual loss       4: Facial palsy (use grimace if obtunded)       0= Normal symmetry    5A: Left arm motor drift (count out loud and use fingers to show count)       0= No drift x 10 seconds      5B: Right arm motor drift       0= No drift x 10 seconds      6A: Left leg motor drift       +2= Some effort against gravity    6B: Right leg motor drift       0= No drift x 5 seconds      7: Limb ataxia (FNF/heel-shin)       0= Does not understand       8: Sensation       0= Normal, no sensory loss       9: Language/aphasia- describe the scene (on chandrakant); name the items; read the sentences (on chandrakant)       +2= Severe aphasia: fragmentary expression, inference needed, cannot identify materials       10: Dysarthria- read the words       0= Normal       11: Extinction/inattention       0= No abnormality  NIHSS of 7  NEUROLOGIC EXAMINATION  Patient is awake and alert. Aphasic. Cannot describe a picture and cannot name any objects. Follows 1 out of 2 simple commands. No obvious dysarthria. EOMI. Visual fields are intact to finger counting. No facial weakness. No UE drifts. Unable to lift the left leg due to recent hip surgery but moves it in bed. No RLE drift. Sensation is intact to light touch b/l. Unable to test for ataxia as patient does not understand the command.      IV Alteplase CONTRAINDICATIONS  [x] None    ABSOLUTE EXCLUSION CRITERIA:  [] Patient outside of the appropriate time window for IV alteplase  [] Evidence of intracranial hemorrhage on pretreatment CT scan (CT must be read by an attending radiologist or attending neurologist)  [] Head CT findings suggesting an established acute cerebral infarction as evidenced by dajuan hypodensity, regardless of size.  [] Clinical presentation consistent with subarachnoid hemorrhage, even with normal CT scan  [] Active internal bleeding  [] Known bleeding diathesis (including but not limited to platelet count < 100,000, use of oral anticoagulants with INR>1.7, use of full dose low molecular       weight heparin within the last 24 hours, use of unfractionated heparin AND a prolonged PTT (> 40sec), use of direct thrombin inhibitor (e.g. dabigatran) or oral direct Factor Xa inhibitor (e.g. rivaroxiban, apixaban) within 48 hours)  [] Systolic pressure >= 185 mmHg or diastolic pressure 110 mmHg on repeated measurements at the time treatment is to begin  [] Care team unable to determine eligibility for IV alteplase  [] Patient, family, or surrogate declines and understands risks and benefits of treatment.  [] For wake-up Protocol patients: DW-MRI lesions larger than one-third of the MCA territory      RELATIVE EXCLUSION CRITERIA    [] Isolated neurological deficits (except for aphasia or hemianopsia)  [] stroke severity too mild (non-disabling)  [] Seizure at onset with post-ictal residual neurological impairment  [] Gastrointestinal, genitourinary or respiratory hemorrhage within 21 days   [] Major surgery within 14 days   [] Blood glucose level < 50 or > 400 mG/dL  [] CPR with chest compressions or minor surgery (including liver and kidney biopsy, thoracocentesis, lumbar puncture) within 10 days   [] Arterial puncture at non-compressible site within 7 days   [] Evidence of acute trauma (fracture)   [] Diabetic hemorrhagic retinopathy or ophthalmic bleeding  [] Pregnancy or peripartum; no nursing post- treatment  [] Post-myocardial infarction pericarditis  [] Peritoneal dialysis or hemodialysis or sever hepatic disease   [] Known bacterial endocarditis   [] Life expectancy less than 6 months or sever co-morbid illness   [] Known cerebral vascular malformation, untreated intracranial aneurysm or brain tumor (may consider IV alteplase in patients with CNS lesions that have a very low likelihood of hemorrhage, such as small un-ruptured aneurysms or benign tumors with low vascularity.  [] Social/Religion reason  [x] Other- hip replacement surgery 4 weeks ago      RISKS/BENEFITS DISCUSSION:  The risks and benefits of IV TNK administration were discussed with patient/family in presence of OSH staff.    ASSESSMENT: Patient is a 75 y/o M with a PMHx of DM, HTN, prostate CA with mets to the bone (on hormonal therapy), s/p left hip surgery 4 weeks ago, who p/w an acute onset of aphasia, LKW at 1:45 pm today. On exam over televideo, patient had an NIHSS of 7 for severe aphasia, trouble answering questions, following 1 out of 2 commands, and trouble lifting the LLE (likely due to recent hip surgery). CTH read no acute infarct or hemorrhage.  I discussed the risks and benefits of IV TNK with patient's wife at bedside who verbalized understanding and asked for the TNK to be administered. TNK was given at 17:13 on 6/16/2025. A CTA was obtained post TNK administration which did not read an LVO. There was a noted  2.5 mm saccular aneurysm noted arising from the right distal M1 segment of the MCA. CTP read a 30 mL perfusion abnormalities in the region of the left temporal occipital region. Patient was not a neuro IR thrombectomy candidate as there was no LVO on vessel imaging.        RECOMMENDATIONS:  [] s/p IV TNK, post TNK monitoring as per protocol and stroke workup      Case discussed with Telestroke attending DR. Christopher Carlton      Consultation provided via live, two-way video streaming. Consultation provided in patient's preferred language. Discussion with: Case was discussed with Dr. Jarrett via audio consult on 6/16/2025 at 4:15 pm. Patient was seen over televideo on 6/16/2025 at 4:35 pm    HISTORY OF PRESENT ILLNESS:  Patient is a 75 y/o M with a PMHx of DM, HTN, prostate CA with mets to the bone (on hormonal therapy), s/p left hip surgery 4 weeks ago, who p/w an acute onset of aphasia. Patient was at his baseline at 1:45 pm today. At around 3 pm, the patient's daughter noticed that he appeared confused an had trouble expressing himself. Upon arrival to the ED, patient had an initial NIHSS of 4 for aphasia and trouble answering questions.       PMH/PSH is PAST MEDICAL & SURGICAL HISTORY:  HTN (hypertension)      DM2 (diabetes mellitus, type 2)      Squamous cell cancer of tongue      Prostate cancer      OA (osteoarthritis)      History of TIAs      History of aortic stenosis      Prostate cancer metastatic to bone      Former smoker      Obesity (BMI 30-39.9)      History of hepatitis C      Neoplasm of tongue      H/O knee surgery      History of tonsillectomy        Pre-stroke MRS=      *Modified [place] Score   0 - No symptoms.      OUTSIDE HOSPITAL COURSE:      HOME MEDICATIONS:  Home Medications:  atenolol 25 mg oral tablet: 1 tab(s) orally once a day (16 May 2025 08:46)  Caltrate 600+D oral tablet, chewable: 1 tab(s) chewed once a day (16 May 2025 08:46)  cholecalciferol 125 mcg (5000 intl units) oral tablet: 1 tab(s) orally once a day (16 May 2025 08:46)  glimepiride 2 mg oral tablet: 1 tab(s) orally once a day (16 May 2025 08:46)  lisinopril 10 mg oral tablet: 1 tab(s) orally once a day (16 May 2025 08:46)  meloxicam 15 mg oral tablet: 1 tab(s) orally once a day (16 May 2025 08:46)  metFORMIN 500 mg oral tablet: 1 tab(s) orally 2 in the am, 1 in the pm (16 May 2025 08:46)  Nubeqa 300 mg oral tablet: 2 tab(s) orally 2 times a day (16 May 2025 08:46)  Orgovyx 120 mg oral tablet: 1 tab(s) orally once a day (16 May 2025 08:46)  tamsulosin 0.4 mg oral capsule: 1 cap(s) orally once a day (16 May 2025 08:46)  Trulicity Pen 0.75 mg/0.5 mL subcutaneous solution: 0.75 milligram(s) subcutaneously once a week on mondays (16 May 2025 08:46)        SMOKING/ETOH/RECREATIONAL DRUGS:      VITALS/DATA/ORDERS: [x] Reviewed    CT Head No Cont (06.16.25 @ 18:34)     FINDINGS:    The ventricles and sulci are unremarkable. There is no hydrocephalus.    Redemonstrated focal hypodensity noted within the left cerebellar   hemisphere, unchanged.    There is no evidence for acute intracranial hemorrhage, mass effect or   midline shift. There are no extra-axial fluid collections. The visualized   intraorbital contents are normal. There is mucosal thickening of the left   frontal, sphenoid, and bilateral ethmoid sinuses. The mastoid air cells   are aerated. The visualized soft tissues and osseous structures appear   normal.      IMPRESSION:  No significant change since recent same day head CT.    Noacute intracranial pathology.    Chronic appearing left cerebellar infarct.    Communication: The summary of above findings were discussed with readback   confirmation with Dr. Cervantes by resident Jose Coyne MD on 6/16/2025 at   6:49 PM.    CT Angio Brain Stroke Protocol  w/ IV Cont (06.16.25 @ 18:34)     CT PERFUSION:    CBF<30%: 0 mL  Tmax>6sec: 30 mL in the region of the left occipital lobe.  Mismatch volume: 30 mL  Mismatch ratio: Infinite    Per RAPID assessment for acute infarct, threshold for ischemic tissue   volume is Tmax > 6 sec.  Threshold for infarct core volume estimate is CBF < 30 percent.  Threshold for poor collateral flow or severe delayed perfusion is Tmax >   10 sec.      CTA HEAD/NECK:    AORTIC ARCH: Mild atherosclerotic plaques without flow-limiting stenosis.    RIGHT ANTERIOR CIRCULATION:  Common carotid artery: No stenosis.  External carotid artery: No stenosis.  Internal carotid artery:  -Extracranial: Calcified plaque at the carotid bulb without stenosis.  -Intracranial: Calcified plaque at the carotid siphon resulting in mild   stenosis of the supraclinoid segment.    Anterior cerebral artery: No stenosis.  Middle cerebral artery: There is a 2.5 mm saccular aneurysm noted arising   from the right distal M1 segment of the MCA, series 307 image 43..      LEFT ANTERIOR CIRCULATION:  Common carotid artery: Noncalcified plaque of the proximal common carotid   artery without stenosis.  External carotid artery: No stenosis.  Internal carotid artery:  -Extracranial: Calcified plaque at the carotid bulb without stenosis.  -Intracranial: Calcified plaque at the carotid siphon without stenosis.    Anterior cerebral artery: No stenosis.  Middle cerebral artery: No stenosis.      POSTERIOR CIRCULATION:  Right vertebral artery: Calcified plaque at the V4 segment without   stenosis.  Left vertebral artery: No stenosis. Terminates as the left PICA.  Basilar artery: No stenosis.  Proximal cerebellar arteries: No stenosis.    Posterior cerebral arteries: No stenosis.      Dural venous sinuses or deep cerebral veins:  No filling defects of the dural venous sinuses or deep cerebral veins.    IMPRESSION:    CT PERFUSION:  The perfusion scan demonstrates 30 mL perfusion abnormalities in the   region of the left temporal occipital region core infarct.    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    Intracranial vessels are otherwise patent and unremarkable    No carotid or vertebral artery stenosis.            Labs:  CAPILLARY BLOOD GLUCOSE      POCT Blood Glucose.: 196 mg/dL (16 Jun 2025 16:11)    Platelet Count - Automated: 224 K/uL (06-16-25 @ 15:55)    PT/INR - ( 16 Jun 2025 15:55 )   PT: 10.70 sec;   INR: 0.91 ratio         PTT - ( 16 Jun 2025 15:55 )  PTT:33.8 sec    EXAMINATION: Assisted by nurse Dorado  NIHSS:  1A: Level of consciousness       0= Alert; keenly responsive    1B: Ask month and age         +2= 0 questions right    1C: "Blink eyes" and "Squeeze Hands"         +1= Performs 1 task        2: Horizontal EOMs       0= Normal       3: Visual fields       0= No visual loss       4: Facial palsy (use grimace if obtunded)       0= Normal symmetry    5A: Left arm motor drift (count out loud and use fingers to show count)       0= No drift x 10 seconds      5B: Right arm motor drift       0= No drift x 10 seconds      6A: Left leg motor drift       +2= Some effort against gravity    6B: Right leg motor drift       0= No drift x 5 seconds      7: Limb ataxia (FNF/heel-shin)       0= Does not understand       8: Sensation       0= Normal, no sensory loss       9: Language/aphasia- describe the scene (on chandrakant); name the items; read the sentences (on chandrakant)       +2= Severe aphasia: fragmentary expression, inference needed, cannot identify materials       10: Dysarthria- read the words       0= Normal       11: Extinction/inattention       0= No abnormality  NIHSS of 7  NEUROLOGIC EXAMINATION  Patient is awake and alert. Aphasic. Cannot describe a picture and cannot name any objects. Follows 1 out of 2 simple commands. No obvious dysarthria. EOMI. Visual fields are intact to finger counting. No facial weakness. No UE drifts. Unable to lift the left leg due to recent hip surgery but moves it in bed. No RLE drift. Sensation is intact to light touch b/l. Unable to test for ataxia as patient does not understand the command.      IV Alteplase CONTRAINDICATIONS  [x] None    ABSOLUTE EXCLUSION CRITERIA:  [] Patient outside of the appropriate time window for IV alteplase  [] Evidence of intracranial hemorrhage on pretreatment CT scan (CT must be read by an attending radiologist or attending neurologist)  [] Head CT findings suggesting an established acute cerebral infarction as evidenced by dajuan hypodensity, regardless of size.  [] Clinical presentation consistent with subarachnoid hemorrhage, even with normal CT scan  [] Active internal bleeding  [] Known bleeding diathesis (including but not limited to platelet count < 100,000, use of oral anticoagulants with INR>1.7, use of full dose low molecular       weight heparin within the last 24 hours, use of unfractionated heparin AND a prolonged PTT (> 40sec), use of direct thrombin inhibitor (e.g. dabigatran) or oral direct Factor Xa inhibitor (e.g. rivaroxiban, apixaban) within 48 hours)  [] Systolic pressure >= 185 mmHg or diastolic pressure 110 mmHg on repeated measurements at the time treatment is to begin  [] Care team unable to determine eligibility for IV alteplase  [] Patient, family, or surrogate declines and understands risks and benefits of treatment.  [] For wake-up Protocol patients: DW-MRI lesions larger than one-third of the MCA territory      RELATIVE EXCLUSION CRITERIA    [] Isolated neurological deficits (except for aphasia or hemianopsia)  [] stroke severity too mild (non-disabling)  [] Seizure at onset with post-ictal residual neurological impairment  [] Gastrointestinal, genitourinary or respiratory hemorrhage within 21 days   [] Major surgery within 14 days   [] Blood glucose level < 50 or > 400 mG/dL  [] CPR with chest compressions or minor surgery (including liver and kidney biopsy, thoracocentesis, lumbar puncture) within 10 days   [] Arterial puncture at non-compressible site within 7 days   [] Evidence of acute trauma (fracture)   [] Diabetic hemorrhagic retinopathy or ophthalmic bleeding  [] Pregnancy or peripartum; no nursing post- treatment  [] Post-myocardial infarction pericarditis  [] Peritoneal dialysis or hemodialysis or sever hepatic disease   [] Known bacterial endocarditis   [] Life expectancy less than 6 months or sever co-morbid illness   [] Known cerebral vascular malformation, untreated intracranial aneurysm or brain tumor (may consider IV alteplase in patients with CNS lesions that have a very low likelihood of hemorrhage, such as small un-ruptured aneurysms or benign tumors with low vascularity.  [] Social/Cheondoism reason  [x] Other- hip replacement surgery 4 weeks ago      RISKS/BENEFITS DISCUSSION:  The risks and benefits of IV TNK administration were discussed with patient/family in presence of OSH staff.    ASSESSMENT: Patient is a 75 y/o M with a PMHx of DM, HTN, prostate CA with mets to the bone (on hormonal therapy), s/p left hip surgery 4 weeks ago, who p/w an acute onset of aphasia, LKW at 1:45 pm today. On exam over televideo, patient had an NIHSS of 7 for severe aphasia, trouble answering questions, following 1 out of 2 commands, and trouble lifting the LLE (likely due to recent hip surgery). CTH read no acute infarct or hemorrhage.  I discussed the risks and benefits of IV TNK with patient's wife at bedside who verbalized understanding and asked for the TNK to be administered. TNK was given at 17:13 on 6/16/2025. A CTA was obtained post TNK administration which did not read an LVO. There was a noted  2.5 mm saccular aneurysm noted arising from the right distal M1 segment of the MCA. CTP read a 30 mL perfusion abnormalities in the region of the left temporal occipital region. Patient was not a neuro IR thrombectomy candidate as there was no LVO on vessel imaging.        RECOMMENDATIONS:  [] s/p IV TNK, post TNK monitoring as per protocol and stroke workup      Case discussed with Telestroke attending DR. Christopher Carlton      Consultation provided via live, two-way video streaming. Consultation provided in patient's preferred language.      Stroke attending Note: Pt discussed with Telestroke ACP and imaging reviewed. No absolute contraindications to TNK. No ELVO for MT consideration. TNK administered and pt admitted to ICU for further evaluation/management for stroke post thrombolytic.

## 2025-06-16 NOTE — H&P ADULT - NSHPPHYSICALEXAM_GEN_ALL_CORE
awake responsive, dysarthric  eyes: PERRLA, EOMI  neck: -jvd/sienna  lungs: cta b/l  heart: s1s2 rrr  abd: soft, nt, nd  ext: -c/c/e  skin: intact  neuro: dysarthric, moving all extremities

## 2025-06-16 NOTE — H&P ADULT - HISTORY OF PRESENT ILLNESS
Pt is a 75 yo male PMHx sig for OA, HTN, DM ii, scc of tongue, AS, prostate cancer w mets to bone, and obesity, recent L hip replacement.  Pt presented w acute aphasia, evaluated in the ed and telestroke, pt was administered TNK.

## 2025-06-17 ENCOUNTER — RESULT REVIEW (OUTPATIENT)
Age: 75
End: 2025-06-17

## 2025-06-17 LAB — MRSA PCR RESULT.: NEGATIVE — SIGNIFICANT CHANGE UP

## 2025-06-17 PROCEDURE — 93306 TTE W/DOPPLER COMPLETE: CPT | Mod: 26

## 2025-06-17 PROCEDURE — 99223 1ST HOSP IP/OBS HIGH 75: CPT

## 2025-06-17 PROCEDURE — 99233 SBSQ HOSP IP/OBS HIGH 50: CPT

## 2025-06-17 PROCEDURE — 70551 MRI BRAIN STEM W/O DYE: CPT | Mod: 26

## 2025-06-17 PROCEDURE — 70450 CT HEAD/BRAIN W/O DYE: CPT | Mod: 26

## 2025-06-17 RX ORDER — ACETAMINOPHEN 500 MG/5ML
1000 LIQUID (ML) ORAL ONCE
Refills: 0 | Status: COMPLETED | OUTPATIENT
Start: 2025-06-17 | End: 2025-06-17

## 2025-06-17 RX ORDER — ACETAMINOPHEN 500 MG/5ML
650 LIQUID (ML) ORAL ONCE
Refills: 0 | Status: COMPLETED | OUTPATIENT
Start: 2025-06-17 | End: 2025-06-17

## 2025-06-17 RX ORDER — ACETAMINOPHEN 500 MG/5ML
650 LIQUID (ML) ORAL EVERY 6 HOURS
Refills: 0 | Status: DISCONTINUED | OUTPATIENT
Start: 2025-06-17 | End: 2025-06-18

## 2025-06-17 RX ORDER — HALOPERIDOL 10 MG/1
5 TABLET ORAL ONCE
Refills: 0 | Status: COMPLETED | OUTPATIENT
Start: 2025-06-17 | End: 2025-06-17

## 2025-06-17 RX ORDER — ATORVASTATIN CALCIUM 80 MG/1
80 TABLET, FILM COATED ORAL AT BEDTIME
Refills: 0 | Status: DISCONTINUED | OUTPATIENT
Start: 2025-06-17 | End: 2025-06-18

## 2025-06-17 RX ORDER — ASPIRIN 325 MG
300 TABLET ORAL DAILY
Refills: 0 | Status: DISCONTINUED | OUTPATIENT
Start: 2025-06-17 | End: 2025-06-17

## 2025-06-17 RX ADMIN — Medication 650 MILLIGRAM(S): at 09:00

## 2025-06-17 RX ADMIN — Medication 650 MILLIGRAM(S): at 08:20

## 2025-06-17 RX ADMIN — Medication 400 MILLIGRAM(S): at 01:11

## 2025-06-17 RX ADMIN — Medication 1 APPLICATION(S): at 06:08

## 2025-06-17 RX ADMIN — Medication 650 MILLIGRAM(S): at 14:00

## 2025-06-17 RX ADMIN — HALOPERIDOL 5 MILLIGRAM(S): 10 TABLET ORAL at 01:15

## 2025-06-17 RX ADMIN — Medication 650 MILLIGRAM(S): at 13:06

## 2025-06-17 RX ADMIN — ATORVASTATIN CALCIUM 80 MILLIGRAM(S): 80 TABLET, FILM COATED ORAL at 22:18

## 2025-06-17 RX ADMIN — Medication 1000 MILLIGRAM(S): at 01:11

## 2025-06-17 NOTE — PHYSICAL THERAPY INITIAL EVALUATION ADULT - ADDITIONAL COMMENTS
pt lives with his wife in a private house with 6 steps to enter with rails and one level inside.  Pt was independent community ambulator prior to admission without AD, pt was an active  prior to admission.  Pt had recent L THR and has RW and SC at home

## 2025-06-17 NOTE — PROGRESS NOTE ADULT - ASSESSMENT
73 yo male with a medical history of OA, HTN, DM, scc of tongue, AS, prostate cancer w mets to bone, and obesity, recent L hip replacement.  Pt presented w acute aphasia, evaluated in the ed and tele-stroke, pt was administered TNK at 5:13pm yesterday    acute CVA s/p TNK     - significant improvement of symptoms   - check repeat CT head at ~5pm today (if negative for bleed start aspirin and sq Lovenox for DVT prophylaxis)   - post TNK protocol   - MRI brain   - check official TTE result   - PT/OT/SLP   - neurology consult   - Tylenol prn for pain   - 50 minutes total spent today on patient care

## 2025-06-17 NOTE — PHYSICAL THERAPY INITIAL EVALUATION ADULT - GENERAL OBSERVATIONS, REHAB EVAL
14;46-15;10 pt was seen for PT IE at bed side, pt is agreeable, chart thoroughly reviewed, RN Sania is aware.  Pt received and left sitting in a recliner chair,nad, +hep lock, +telemonitoring, +BP cuff and +pulse ox, +call bell within reach, bed side table at reach

## 2025-06-17 NOTE — CONSULT NOTE ADULT - NS ATTEND AMEND GEN_ALL_CORE FT
Patient was seen today 6/18/2025  via telemedicine visit by BRAYAN Paez was at bedside.  Events noteded MRI did not demonstrate any new infarct or intracranial hemorrhage there was a small chronic infarct within the left cerebellar hemisphere which was new since 3/8/2023.  His medications were adjusted by neurology and I explained to the patient and I have no objections for him to continue his treatment for his metastatic prostate cancer to the bone and lymph nodes with Orgovyx and Nubeqa and he could restart it as soon as he gets home.  He will be discharged today Patient was seen today 6/18/2025  via telemedicine. BRAYAN Paez was at bedside.  Events noteded MRI did not demonstrate any new infarct or intracranial hemorrhage there was a small chronic infarct within the left cerebellar hemisphere which was new since 3/8/2023.  His medications were adjusted by neurology and I explained to the patient and I have no objections for him to continue his treatment for his metastatic prostate cancer to the bone and lymph nodes with Orgovyx and Nubeqa and he could restart it as soon as he gets home.  He will be discharged today

## 2025-06-17 NOTE — OCCUPATIONAL THERAPY INITIAL EVALUATION ADULT - LEVEL OF INDEPENDENCE: DRESS UPPER BODY, OT EVAL
seated in recliner/supervision Pain medicine has been prescribed for you, as needed, and it often causes constipation.  Take docusate sodium (Colace) 100mg 3x daily, while taking narcotic pain medication.   For Constipation :   • Increase your water intake. Drink at least 8 glasses of water daily.  • Try adding fiber to your diet by eating fruits, vegetables and foods that are rich in grains.  • If you do experience constipation, you may take an over-the-counter laxative such as, Senokot, Miralax or  Milk of Magnesia.

## 2025-06-17 NOTE — SWALLOW BEDSIDE ASSESSMENT ADULT - SWALLOW EVAL: FUNCTIONAL LEVEL AT TIME OF EVAL
Pt reports tongue CA ~2016 s/p excision, chemo/rad. Denies dysphagia, weight loss, or hx of PNA. BMI 29

## 2025-06-17 NOTE — CONSULT NOTE ADULT - ASSESSMENT
73yo RHM PMH prostate cancer with mets to bones, obesity, recent L hip replacement, OA, HTN, DMII, SCC of tongue, AS presented to ED for aphasia. In ED, CTH reports chronic appearing left cerebellar infarct. 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 S/p TNK. Currently mostly back to baseline, with exception of some lingering mild confusion. On exam, no focal neuro deficits. NIHSS=0. Etiology likely stroke.  Mohs Case Number: [id number] Date Of Previous Biopsy (Optional): 2/10/23 Previous Accession (Optional): [id number] Biopsy Photograph Reviewed: Yes Referring Physician (Optional): Dr. Hernandez Consent Type: Consent 1 (Standard) Eye Shield Used: No  73yo RHM PMH prostate cancer with mets to bones, obesity, recent L hip replacement, OA, HTN, DMII, SCC of tongue, AS presented to ED for aphasia. In ED, CTH reports chronic appearing left cerebellar infarct. 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 S/p TNK. Currently mostly back to baseline, with exception of some lingering mild confusion. On exam, no focal neuro deficits. NIHSS=0. Etiology likely stroke.     Recommendations  - Repeat CTH 24hrs after TNK (5:13pm). If (-) for bleed, start DAPT x21 days then ASA 81mg qd  - Continue atorvastatin 80mg qd  - TTE  - Q4hr stroke neuro checks  - Medical management per primary team    Discussed with attending Dr Sapp  Surgeon Performing Repair (Optional): Dr. Bosch Initial Size Of Lesion: 1.1 X Size Of Lesion In Cm (Optional): 0.8 Number Of Stages: 2 Primary Defect Length In Cm (Final Defect Size - Required For Flaps/Grafts): 1.3 Primary Defect Width In Cm (Final Defect Size - Required For Flaps/Grafts): 1.2 Repair Type: Complex Repair Oculoplastic Surgeon Procedure Text (A): After obtaining clear surgical margins the patient was sent to oculoplastics for surgical repair.  The patient understands they will receive post-surgical care and follow-up from the referring physician's office. Otolaryngologist Procedure Text (A): After obtaining clear surgical margins the patient was sent to otolaryngology for surgical repair.  The patient understands they will receive post-surgical care and follow-up from the referring physician's office. Plastic Surgeon Procedure Text (A): After obtaining clear surgical margins the patient was sent to plastics for surgical repair.  The patient understands they will receive post-surgical care and follow-up from the referring physician's office. Mid-Level Procedure Text (A): After obtaining clear surgical margins the patient was sent to a mid-level provider for surgical repair.  The patient understands they will receive post-surgical care and follow-up from the mid-level provider. Provider Procedure Text (A): After obtaining clear surgical margins the defect was repaired by another provider. Asc Procedure Text (A): After obtaining clear surgical margins the patient was sent to an ASC for surgical repair.  The patient understands they will receive post-surgical care and follow-up from the ASC physician. Simple / Intermediate / Complex Repair - Final Wound Length In Cm: 4.2 Suturegard Retention Suture: 2-0 Nylon Retention Suture Bite Size: 3 mm Length To Time In Minutes Device Was In Place: 10 Number Of Hemigard Strips Per Side: 1 Distance Of Undermining In Cm (Required): 1.5 Undermining Type: Entire Wound Debridement Text: The wound edges were debrided prior to proceeding with the closure to facilitate wound healing. Helical Rim Text: The closure involved the helical rim. Vermilion Border Text: The closure involved the vermilion border. Nostril Rim Text: The closure involved the nostril rim. Retention Suture Text: Retention sutures were placed to support the closure and prevent dehiscence. Secondary Defect Length In Cm (Required For Flaps): 0 Area H Indication Text: Tumors in this location are included in Area H (eyelids, eyebrows, nose, lips, chin, ear, pre-auricular, post-auricular, temple, genitalia, hands, feet, ankles and areola).  Tissue conservation is critical in these anatomic locations. Area M Indication Text: Tumors in this location are included in Area M (cheek, forehead, scalp, neck, jawline and pretibial skin).  Mohs surgery is indicated for tumors in these anatomic locations. Area L Indication Text: Tumors in this location are included in Area L (trunk and extremities).  Mohs surgery is indicated for larger tumors, or tumors with aggressive histologic features, in these anatomic locations. Tumor Debulked?: curette Depth Of Tumor Invasion (For Histology): dermis Perineural Invasion (For Histology - Be Specific If Possible): absent Surgical Defect Width In Cm (Optional): 0.9 Special Stains Stage 1 - Results: Base On Clearance Noted Above Stage 1 Add-On Histology Text: Aggregates of basaloid cells invading into the dermis Stage 2: Additional Anesthesia Volume In Cc: 1.0 Stage 2: Additional Anesthesia Type: 0.5% lidocaine with 1:200,000 epinephrine Stage 3: Additional Anesthesia Type: 1% lidocaine with epinephrine Staging Info: By selecting yes to the question above you will include information on AJCC 8 tumor staging in your Mohs note. Information on tumor staging will be automatically added for SCCs on the head and neck. AJCC 8 includes tumor size, tumor depth, perineural involvement and bone invasion. Tumor Depth: Less than 6mm from granular layer and no invasion beyond the subcutaneous fat Was The Patient On Physician Recommended Anticoagulation Therapy?: Please Select the Appropriate Response Medical Necessity Statement: Based on my medical judgement, Mohs surgery is the most appropriate treatment for this cancer compared to other treatments. Alternatives Discussed Intro (Do Not Add Period): I discussed alternative treatments to Mohs surgery and specifically discussed the risks and benefits of Consent 1/Introductory Paragraph: The rationale for Mohs was explained to the patient and consent was obtained. The risks, benefits and alternatives to therapy were discussed in detail. Specifically, the risks of infection, scarring, bleeding, prolonged wound healing, incomplete removal, allergy to anesthesia, nerve injury and recurrence were addressed. Prior to the procedure, the treatment site was clearly identified and confirmed by the patient. All components of Universal Protocol/PAUSE Rule completed. Consent 2/Introductory Paragraph: Mohs surgery was explained to the patient and consent was obtained. The risks, benefits and alternatives to therapy were discussed in detail. Specifically, the risks of infection, scarring, bleeding, prolonged wound healing, incomplete removal, allergy to anesthesia, nerve injury and recurrence were addressed. Prior to the procedure, the treatment site was clearly identified and confirmed by the patient. All components of Universal Protocol/PAUSE Rule completed. Consent 3/Introductory Paragraph: I gave the patient a chance to ask questions they had about the procedure.  Following this I explained the Mohs procedure and consent was obtained. The risks, benefits and alternatives to therapy were discussed in detail. Specifically, the risks of infection, scarring, bleeding, prolonged wound healing, incomplete removal, allergy to anesthesia, nerve injury and recurrence were addressed. Prior to the procedure, the treatment site was clearly identified and confirmed by the patient. All components of Universal Protocol/PAUSE Rule completed. Consent (Temporal Branch)/Introductory Paragraph: The rationale for Mohs was explained to the patient and consent was obtained. The risks, benefits and alternatives to therapy were discussed in detail. Specifically, the risks of damage to the temporal branch of the facial nerve, infection, scarring, bleeding, prolonged wound healing, incomplete removal, allergy to anesthesia, and recurrence were addressed. Prior to the procedure, the treatment site was clearly identified and confirmed by the patient. All components of Universal Protocol/PAUSE Rule completed. Consent (Marginal Mandibular)/Introductory Paragraph: The rationale for Mohs was explained to the patient and consent was obtained. The risks, benefits and alternatives to therapy were discussed in detail. Specifically, the risks of damage to the marginal mandibular branch of the facial nerve, infection, scarring, bleeding, prolonged wound healing, incomplete removal, allergy to anesthesia, and recurrence were addressed. Prior to the procedure, the treatment site was clearly identified and confirmed by the patient. All components of Universal Protocol/PAUSE Rule completed. Consent (Spinal Accessory)/Introductory Paragraph: The rationale for Mohs was explained to the patient and consent was obtained. The risks, benefits and alternatives to therapy were discussed in detail. Specifically, the risks of damage to the spinal accessory nerve, infection, scarring, bleeding, prolonged wound healing, incomplete removal, allergy to anesthesia, and recurrence were addressed. Prior to the procedure, the treatment site was clearly identified and confirmed by the patient. All components of Universal Protocol/PAUSE Rule completed. Consent (Near Eyelid Margin)/Introductory Paragraph: The rationale for Mohs was explained to the patient and consent was obtained. The risks, benefits and alternatives to therapy were discussed in detail. Specifically, the risks of ectropion or eyelid deformity, infection, scarring, bleeding, prolonged wound healing, incomplete removal, allergy to anesthesia, nerve injury and recurrence were addressed. Prior to the procedure, the treatment site was clearly identified and confirmed by the patient. All components of Universal Protocol/PAUSE Rule completed. Consent (Ear)/Introductory Paragraph: The rationale for Mohs was explained to the patient and consent was obtained. The risks, benefits and alternatives to therapy were discussed in detail. Specifically, the risks of ear deformity, infection, scarring, bleeding, prolonged wound healing, incomplete removal, allergy to anesthesia, nerve injury and recurrence were addressed. Prior to the procedure, the treatment site was clearly identified and confirmed by the patient. All components of Universal Protocol/PAUSE Rule completed. Consent (Nose)/Introductory Paragraph: The rationale for Mohs was explained to the patient and consent was obtained. The risks, benefits and alternatives to therapy were discussed in detail. Specifically, the risks of nasal deformity, changes in the flow of air through the nose, infection, scarring, bleeding, prolonged wound healing, incomplete removal, allergy to anesthesia, nerve injury and recurrence were addressed. Prior to the procedure, the treatment site was clearly identified and confirmed by the patient. All components of Universal Protocol/PAUSE Rule completed. Consent (Lip)/Introductory Paragraph: The rationale for Mohs was explained to the patient and consent was obtained. The risks, benefits and alternatives to therapy were discussed in detail. Specifically, the risks of lip deformity, changes in the oral aperture, infection, scarring, bleeding, prolonged wound healing, incomplete removal, allergy to anesthesia, nerve injury and recurrence were addressed. Prior to the procedure, the treatment site was clearly identified and confirmed by the patient. All components of Universal Protocol/PAUSE Rule completed. Consent (Scalp)/Introductory Paragraph: The rationale for Mohs was explained to the patient and consent was obtained. The risks, benefits and alternatives to therapy were discussed in detail. Specifically, the risks of changes in hair growth pattern secondary to repair, infection, scarring, bleeding, prolonged wound healing, incomplete removal, allergy to anesthesia, nerve injury and recurrence were addressed. Prior to the procedure, the treatment site was clearly identified and confirmed by the patient. All components of Universal Protocol/PAUSE Rule completed. Detail Level: Detailed Postop Diagnosis: same Additional Anesthesia Volume In Cc: 6 Hemostasis: Electrocautery Estimated Blood Loss (Cc): minimal Brow Lift Text: A midfrontal incision was made medially to the defect to allow access to the tissues just superior to the left eyebrow. Following careful dissection inferiorly in a supraperiosteal plane to the level of the left eyebrow, several 3-0 monocryl sutures were used to resuspend the eyebrow orbicularis oculi muscular unit to the superior frontal bone periosteum. This resulted in an appropriate reapproximation of static eyebrow symmetry and correction of the left brow ptosis. Deep Sutures: 4-0 Monocryl Epidermal Sutures: 5-0 Prolene Epidermal Closure: running Suturegard Intro: Intraoperative tissue expansion was performed, utilizing the SUTUREGARD device, in order to reduce wound tension. Suturegard Body: The suture ends were repeatedly re-tightened and re-clamped to achieve the desired tissue expansion. Hemigard Intro: Due to skin fragility and wound tension, it was decided to use HEMIGARD adhesive retention suture devices to permit a linear closure. The skin was cleaned and dried for a 6cm distance away from the wound. Excessive hair, if present, was removed to allow for adhesion. Hemigard Postcare Instructions: The HEMIGARD strips are to remain completely dry for at least 5-7 days. Donor Site Anesthesia Type: same as repair anesthesia Epidermal Closure Graft Donor Site (Optional): simple interrupted Graft Donor Site Bandage (Optional-Leave Blank If You Don't Want In Note): Steri-strips and a pressure bandage were applied to the donor site. Closure 2 Information: This tab is for additional flaps and grafts, including complex repair and grafts and complex repair and flaps. You can also specify a different location for the additional defect, if the location is the same you do not need to select a new one. We will insert the automated text for the repair you select below just as we do for solitary flaps and grafts. Please note that at this time if you select a location with a different insurance zone you will need to override the ICD10 and CPT if appropriate. Closure 3 Information: This tab is for additional flaps and grafts above and beyond our usual structured repairs.  Please note if you enter information here it will not currently bill and you will need to add the billing information manually. Wound Care: Petrolatum Dressing: pressure dressing with telfa Dressing (No Sutures): dry sterile dressing Suture Removal: 7 days Unna Boot Text: An Unna boot was placed to help immobilize the limb and facilitate more rapid healing. Home Suture Removal Text: Patient was provided instructions on removing sutures and will remove their sutures at home.  If they have any questions or difficulties they will call the office. Post-Care Instructions: I reviewed with the patient in detail post-care instructions. Patient is not to engage in any heavy lifting, exercise, or swimming for the next 14 days. Should the patient develop any fevers, chills, bleeding, severe pain patient will contact the office immediately. Pain Refusal Text: I offered to prescribe pain medication but the patient refused to take this medication. Mauc Instructions: By selecting yes to the question below the MAUC number will be added into the note.  This will be calculated automatically based on the diagnosis chosen, the size entered, the body zone selected (H,M,L) and the specific indications you chose. You will also have the option to override the Mohs AUC if you disagree with the automatically calculated number and this option is found in the Case Summary tab. Where Do You Want The Question To Include Opioid Counseling Located?: Case Summary Tab Eye Protection Verbiage: Before proceeding with the stage, a plastic scleral shield was inserted. The globe was anesthetized with a few drops of 1% lidocaine with 1:100,000 epinephrine. Then, an appropriate sized scleral shield was chosen and coated with lacrilube ointment. The shield was gently inserted and left in place for the duration of each stage. After the stage was completed, the shield was gently removed. Mohs Method Verbiage: An incision at a 45 degree angle following the standard Mohs approach was done and the specimen was harvested as a microscopic controlled layer. Surgeon/Pathologist Verbiage (Will Incorporate Name Of Surgeon From Intro If Not Blank): operated in two distinct and integrated capacities as the surgeon and pathologist. Mohs Histo Method Verbiage: Each section was then chromacoded and processed in the Mohs lab using the Mohs protocol and submitted for frozen section. Subsequent Stages Histo Method Verbiage: Using a similar technique to that described above, a thin layer of tissue was removed from all areas where tumor was visible on the previous stage.  The tissue was again oriented, mapped, dyed, and processed as above. Mohs Rapid Report Verbiage: The area of clinically evident tumor was marked with skin marking ink and appropriately hatched.  The initial incision was made following the Mohs approach through the skin.  The specimen was taken to the lab, divided into the necessary number of pieces, chromacoded and processed according to the Mohs protocol.  This was repeated in successive stages until a tumor free defect was achieved. Complex Repair Preamble Text (Leave Blank If You Do Not Want): Extensive wide undermining was performed. Intermediate Repair Preamble Text (Leave Blank If You Do Not Want): Undermining was performed with blunt dissection. Non-Graft Cartilage Fenestration Text: The cartilage was fenestrated with a 2mm punch biopsy to help facilitate healing. Graft Cartilage Fenestration Text: The cartilage was fenestrated with a 2mm punch biopsy to help facilitate graft survival and healing. Secondary Intention Text (Leave Blank If You Do Not Want): The defect will heal with secondary intention. No Repair - Repaired With Adjacent Surgical Defect Text (Leave Blank If You Do Not Want): After obtaining clear surgical margins the defect was repaired concurrently with another surgical defect which was in close approximation. Adjacent Tissue Transfer Text: The defect edges were debeveled with a #15 scalpel blade.  Given the location of the defect and the proximity to free margins an adjacent tissue transfer was deemed most appropriate.  Using a sterile surgical marker, an appropriate flap was drawn incorporating the defect and placing the expected incisions within the relaxed skin tension lines where possible.    The area thus outlined was incised deep to adipose tissue with a #15 scalpel blade.  The skin margins were undermined to an appropriate distance in all directions utilizing iris scissors. Advancement Flap (Single) Text: The defect edges were debeveled with a #15 scalpel blade.  Given the location of the defect and the proximity to free margins a single advancement flap was deemed most appropriate.  Using a sterile surgical marker, an appropriate advancement flap was drawn incorporating the defect and placing the expected incisions within the relaxed skin tension lines where possible.    The area thus outlined was incised deep to adipose tissue with a #15 scalpel blade.  The skin margins were undermined to an appropriate distance in all directions utilizing iris scissors. Advancement Flap (Double) Text: The defect edges were debeveled with a #15 scalpel blade.  Given the location of the defect and the proximity to free margins a double advancement flap was deemed most appropriate.  Using a sterile surgical marker, the appropriate advancement flaps were drawn incorporating the defect and placing the expected incisions within the relaxed skin tension lines where possible.    The area thus outlined was incised deep to adipose tissue with a #15 scalpel blade.  The skin margins were undermined to an appropriate distance in all directions utilizing iris scissors. Burow's Advancement Flap Text: The defect edges were debeveled with a #15 scalpel blade.  Given the location of the defect and the proximity to free margins a Burow's advancement flap was deemed most appropriate.  Using a sterile surgical marker, the appropriate advancement flap was drawn incorporating the defect and placing the expected incisions within the relaxed skin tension lines where possible.    The area thus outlined was incised deep to adipose tissue with a #15 scalpel blade.  The skin margins were undermined to an appropriate distance in all directions utilizing iris scissors. Chonodrocutaneous Helical Advancement Flap Text: The defect edges were debeveled with a #15 scalpel blade.  Given the location of the defect and the proximity to free margins a chondrocutaneous helical advancement flap was deemed most appropriate.  Using a sterile surgical marker, the appropriate advancement flap was drawn incorporating the defect and placing the expected incisions within the relaxed skin tension lines where possible.    The area thus outlined was incised deep to adipose tissue with a #15 scalpel blade.  The skin margins were undermined to an appropriate distance in all directions utilizing iris scissors. Crescentic Advancement Flap Text: The defect edges were debeveled with a #15 scalpel blade.  Given the location of the defect and the proximity to free margins a crescentic advancement flap was deemed most appropriate.  Using a sterile surgical marker, the appropriate advancement flap was drawn incorporating the defect and placing the expected incisions within the relaxed skin tension lines where possible.    The area thus outlined was incised deep to adipose tissue with a #15 scalpel blade.  The skin margins were undermined to an appropriate distance in all directions utilizing iris scissors. A-T Advancement Flap Text: The defect edges were debeveled with a #15 scalpel blade.  Given the location of the defect, shape of the defect and the proximity to free margins an A-T advancement flap was deemed most appropriate.  Using a sterile surgical marker, an appropriate advancement flap was drawn incorporating the defect and placing the expected incisions within the relaxed skin tension lines where possible.    The area thus outlined was incised deep to adipose tissue with a #15 scalpel blade.  The skin margins were undermined to an appropriate distance in all directions utilizing iris scissors. O-T Advancement Flap Text: The defect edges were debeveled with a #15 scalpel blade.  Given the location of the defect, shape of the defect and the proximity to free margins an O-T advancement flap was deemed most appropriate.  Using a sterile surgical marker, an appropriate advancement flap was drawn incorporating the defect and placing the expected incisions within the relaxed skin tension lines where possible.    The area thus outlined was incised deep to adipose tissue with a #15 scalpel blade.  The skin margins were undermined to an appropriate distance in all directions utilizing iris scissors. O-L Flap Text: The defect edges were debeveled with a #15 scalpel blade.  Given the location of the defect, shape of the defect and the proximity to free margins an O-L flap was deemed most appropriate.  Using a sterile surgical marker, an appropriate advancement flap was drawn incorporating the defect and placing the expected incisions within the relaxed skin tension lines where possible.    The area thus outlined was incised deep to adipose tissue with a #15 scalpel blade.  The skin margins were undermined to an appropriate distance in all directions utilizing iris scissors. O-Z Flap Text: The defect edges were debeveled with a #15 scalpel blade.  Given the location of the defect, shape of the defect and the proximity to free margins an O-Z flap was deemed most appropriate.  Using a sterile surgical marker, an appropriate transposition flap was drawn incorporating the defect and placing the expected incisions within the relaxed skin tension lines where possible. The area thus outlined was incised deep to adipose tissue with a #15 scalpel blade.  The skin margins were undermined to an appropriate distance in all directions utilizing iris scissors. Double O-Z Flap Text: The defect edges were debeveled with a #15 scalpel blade.  Given the location of the defect, shape of the defect and the proximity to free margins a Double O-Z flap was deemed most appropriate.  Using a sterile surgical marker, an appropriate transposition flap was drawn incorporating the defect and placing the expected incisions within the relaxed skin tension lines where possible. The area thus outlined was incised deep to adipose tissue with a #15 scalpel blade.  The skin margins were undermined to an appropriate distance in all directions utilizing iris scissors. V-Y Flap Text: The defect edges were debeveled with a #15 scalpel blade.  Given the location of the defect, shape of the defect and the proximity to free margins a V-Y flap was deemed most appropriate.  Using a sterile surgical marker, an appropriate advancement flap was drawn incorporating the defect and placing the expected incisions within the relaxed skin tension lines where possible.    The area thus outlined was incised deep to adipose tissue with a #15 scalpel blade.  The skin margins were undermined to an appropriate distance in all directions utilizing iris scissors. Advancement-Rotation Flap Text: The defect edges were debeveled with a #15 scalpel blade.  Given the location of the defect, shape of the defect and the proximity to free margins an advancement-rotation flap was deemed most appropriate.  Using a sterile surgical marker, an appropriate flap was drawn incorporating the defect and placing the expected incisions within the relaxed skin tension lines where possible. The area thus outlined was incised deep to adipose tissue with a #15 scalpel blade.  The skin margins were undermined to an appropriate distance in all directions utilizing iris scissors. Mercedes Flap Text: The defect edges were debeveled with a #15 scalpel blade.  Given the location of the defect, shape of the defect and the proximity to free margins a Mercedes flap was deemed most appropriate.  Using a sterile surgical marker, an appropriate advancement flap was drawn incorporating the defect and placing the expected incisions within the relaxed skin tension lines where possible. The area thus outlined was incised deep to adipose tissue with a #15 scalpel blade.  The skin margins were undermined to an appropriate distance in all directions utilizing iris scissors. Modified Advancement Flap Text: The defect edges were debeveled with a #15 scalpel blade.  Given the location of the defect, shape of the defect and the proximity to free margins a modified advancement flap was deemed most appropriate.  Using a sterile surgical marker, an appropriate advancement flap was drawn incorporating the defect and placing the expected incisions within the relaxed skin tension lines where possible.    The area thus outlined was incised deep to adipose tissue with a #15 scalpel blade.  The skin margins were undermined to an appropriate distance in all directions utilizing iris scissors. Mucosal Advancement Flap Text: Given the location of the defect, shape of the defect and the proximity to free margins a mucosal advancement flap was deemed most appropriate. Incisions were made with a 15 blade scalpel in the appropriate fashion along the cutaneous vermilion border and the mucosal lip. The remaining actinically damaged mucosal tissue was excised.  The mucosal advancement flap was then elevated to the gingival sulcus with care taken to preserve the neurovascular structures and advanced into the primary defect. Care was taken to ensure that precise realignment of the vermilion border was achieved. Peng Advancement Flap Text: The defect edges were debeveled with a #15 scalpel blade.  Given the location of the defect, shape of the defect and the proximity to free margins a Peng advancement flap was deemed most appropriate.  Using a sterile surgical marker, an appropriate advancement flap was drawn incorporating the defect and placing the expected incisions within the relaxed skin tension lines where possible. The area thus outlined was incised deep to adipose tissue with a #15 scalpel blade.  The skin margins were undermined to an appropriate distance in all directions utilizing iris scissors. Hatchet Flap Text: The defect edges were debeveled with a #15 scalpel blade.  Given the location of the defect, shape of the defect and the proximity to free margins a hatchet flap was deemed most appropriate.  Using a sterile surgical marker, an appropriate hatchet flap was drawn incorporating the defect and placing the expected incisions within the relaxed skin tension lines where possible.    The area thus outlined was incised deep to adipose tissue with a #15 scalpel blade.  The skin margins were undermined to an appropriate distance in all directions utilizing iris scissors. Rotation Flap Text: The defect edges were debeveled with a #15 scalpel blade.  Given the location of the defect, shape of the defect and the proximity to free margins a rotation flap was deemed most appropriate.  Using a sterile surgical marker, an appropriate rotation flap was drawn incorporating the defect and placing the expected incisions within the relaxed skin tension lines where possible.    The area thus outlined was incised deep to adipose tissue with a #15 scalpel blade.  The skin margins were undermined to an appropriate distance in all directions utilizing iris scissors. Spiral Flap Text: The defect edges were debeveled with a #15 scalpel blade.  Given the location of the defect, shape of the defect and the proximity to free margins a spiral flap was deemed most appropriate.  Using a sterile surgical marker, an appropriate rotation flap was drawn incorporating the defect and placing the expected incisions within the relaxed skin tension lines where possible. The area thus outlined was incised deep to adipose tissue with a #15 scalpel blade.  The skin margins were undermined to an appropriate distance in all directions utilizing iris scissors. Staged Advancement Flap Text: The defect edges were debeveled with a #15 scalpel blade.  Given the location of the defect, shape of the defect and the proximity to free margins a staged advancement flap was deemed most appropriate.  Using a sterile surgical marker, an appropriate advancement flap was drawn incorporating the defect and placing the expected incisions within the relaxed skin tension lines where possible. The area thus outlined was incised deep to adipose tissue with a #15 scalpel blade.  The skin margins were undermined to an appropriate distance in all directions utilizing iris scissors. Star Wedge Flap Text: The defect edges were debeveled with a #15 scalpel blade.  Given the location of the defect, shape of the defect and the proximity to free margins a star wedge flap was deemed most appropriate.  Using a sterile surgical marker, an appropriate rotation flap was drawn incorporating the defect and placing the expected incisions within the relaxed skin tension lines where possible. The area thus outlined was incised deep to adipose tissue with a #15 scalpel blade.  The skin margins were undermined to an appropriate distance in all directions utilizing iris scissors. Transposition Flap Text: The defect edges were debeveled with a #15 scalpel blade.  Given the location of the defect and the proximity to free margins a transposition flap was deemed most appropriate.  Using a sterile surgical marker, an appropriate transposition flap was drawn incorporating the defect.    The area thus outlined was incised deep to adipose tissue with a #15 scalpel blade.  The skin margins were undermined to an appropriate distance in all directions utilizing iris scissors. Muscle Hinge Flap Text: The defect edges were debeveled with a #15 scalpel blade.  Given the size, depth and location of the defect and the proximity to free margins a muscle hinge flap was deemed most appropriate.  Using a sterile surgical marker, an appropriate hinge flap was drawn incorporating the defect. The area thus outlined was incised with a #15 scalpel blade.  The skin margins were undermined to an appropriate distance in all directions utilizing iris scissors. Mustarde Flap Text: The defect edges were debeveled with a #15 scalpel blade.  Given the size, depth and location of the defect and the proximity to free margins a Mustarde flap was deemed most appropriate.  Using a sterile surgical marker, an appropriate flap was drawn incorporating the defect. The area thus outlined was incised with a #15 scalpel blade.  The skin margins were undermined to an appropriate distance in all directions utilizing iris scissors. Nasal Turnover Hinge Flap Text: The defect edges were debeveled with a #15 scalpel blade.  Given the size, depth, location of the defect and the defect being full thickness a nasal turnover hinge flap was deemed most appropriate.  Using a sterile surgical marker, an appropriate hinge flap was drawn incorporating the defect. The area thus outlined was incised with a #15 scalpel blade. The flap was designed to recreate the nasal mucosal lining and the alar rim. The skin margins were undermined to an appropriate distance in all directions utilizing iris scissors. Nasalis-Muscle-Based Myocutaneous Island Pedicle Flap Text: Using a #15 blade, an incision was made around the donor flap to the level of the nasalis muscle. Wide lateral undermining was then performed in both the subcutaneous plane above the nasalis muscle, and in a submuscular plane just above periosteum. This allowed the formation of a free nasalis muscle axial pedicle (based on the angular artery) which was still attached to the actual cutaneous flap, increasing its mobility and vascular viability. Hemostasis was obtained with pinpoint electrocoagulation. The flap was mobilized into position and the pivotal anchor points positioned and stabilized with buried interrupted sutures. Subcutaneous and dermal tissues were closed in a multilayered fashion with sutures. Tissue redundancies were excised, and the epidermal edges were apposed without significant tension and sutured with sutures. Orbicularis Oris Muscle Flap Text: The defect edges were debeveled with a #15 scalpel blade.  Given that the defect affected the competency of the oral sphincter an orbicularis oris muscle flap was deemed most appropriate to restore this competency and normal muscle function.  Using a sterile surgical marker, an appropriate flap was drawn incorporating the defect. The area thus outlined was incised with a #15 scalpel blade. Melolabial Transposition Flap Text: The defect edges were debeveled with a #15 scalpel blade.  Given the location of the defect and the proximity to free margins a melolabial flap was deemed most appropriate.  Using a sterile surgical marker, an appropriate melolabial transposition flap was drawn incorporating the defect.    The area thus outlined was incised deep to adipose tissue with a #15 scalpel blade.  The skin margins were undermined to an appropriate distance in all directions utilizing iris scissors. Rhombic Flap Text: The defect edges were debeveled with a #15 scalpel blade.  Given the location of the defect and the proximity to free margins a rhombic flap was deemed most appropriate.  Using a sterile surgical marker, an appropriate rhombic flap was drawn incorporating the defect.    The area thus outlined was incised deep to adipose tissue with a #15 scalpel blade.  The skin margins were undermined to an appropriate distance in all directions utilizing iris scissors. Rhomboid Transposition Flap Text: The defect edges were debeveled with a #15 scalpel blade.  Given the location of the defect and the proximity to free margins a rhomboid transposition flap was deemed most appropriate.  Using a sterile surgical marker, an appropriate rhomboid flap was drawn incorporating the defect.    The area thus outlined was incised deep to adipose tissue with a #15 scalpel blade.  The skin margins were undermined to an appropriate distance in all directions utilizing iris scissors. Bi-Rhombic Flap Text: The defect edges were debeveled with a #15 scalpel blade.  Given the location of the defect and the proximity to free margins a bi-rhombic flap was deemed most appropriate.  Using a sterile surgical marker, an appropriate rhombic flap was drawn incorporating the defect. The area thus outlined was incised deep to adipose tissue with a #15 scalpel blade.  The skin margins were undermined to an appropriate distance in all directions utilizing iris scissors. Helical Rim Advancement Flap Text: The defect edges were debeveled with a #15 blade scalpel.  Given the location of the defect and the proximity to free margins (helical rim) a double helical rim advancement flap was deemed most appropriate.  Using a sterile surgical marker, the appropriate advancement flaps were drawn incorporating the defect and placing the expected incisions between the helical rim and antihelix where possible.  The area thus outlined was incised through and through with a #15 scalpel blade.  With a skin hook and iris scissors, the flaps were gently and sharply undermined and freed up. Bilateral Helical Rim Advancement Flap Text: The defect edges were debeveled with a #15 blade scalpel.  Given the location of the defect and the proximity to free margins (helical rim) a bilateral helical rim advancement flap was deemed most appropriate.  Using a sterile surgical marker, the appropriate advancement flaps were drawn incorporating the defect and placing the expected incisions between the helical rim and antihelix where possible.  The area thus outlined was incised through and through with a #15 scalpel blade.  With a skin hook and iris scissors, the flaps were gently and sharply undermined and freed up. Ear Star Wedge Flap Text: The defect edges were debeveled with a #15 blade scalpel.  Given the location of the defect and the proximity to free margins (helical rim) an ear star wedge flap was deemed most appropriate.  Using a sterile surgical marker, the appropriate flap was drawn incorporating the defect and placing the expected incisions between the helical rim and antihelix where possible.  The area thus outlined was incised through and through with a #15 scalpel blade. Banner Transposition Flap Text: The defect edges were debeveled with a #15 scalpel blade.  Given the location of the defect and the proximity to free margins a Banner transposition flap was deemed most appropriate.  Using a sterile surgical marker, an appropriate flap drawn around the defect. The area thus outlined was incised deep to adipose tissue with a #15 scalpel blade.  The skin margins were undermined to an appropriate distance in all directions utilizing iris scissors. Bilobed Flap Text: The defect edges were debeveled with a #15 scalpel blade.  Given the location of the defect and the proximity to free margins a bilobe flap was deemed most appropriate.  Using a sterile surgical marker, an appropriate bilobe flap drawn around the defect.    The area thus outlined was incised deep to adipose tissue with a #15 scalpel blade.  The skin margins were undermined to an appropriate distance in all directions utilizing iris scissors. Bilobed Transposition Flap Text: The defect edges were debeveled with a #15 scalpel blade.  Given the location of the defect and the proximity to free margins a bilobed transposition flap was deemed most appropriate.  Using a sterile surgical marker, an appropriate bilobe flap drawn around the defect.    The area thus outlined was incised deep to adipose tissue with a #15 scalpel blade.  The skin margins were undermined to an appropriate distance in all directions utilizing iris scissors. Trilobed Flap Text: The defect edges were debeveled with a #15 scalpel blade.  Given the location of the defect and the proximity to free margins a trilobed flap was deemed most appropriate.  Using a sterile surgical marker, an appropriate trilobed flap drawn around the defect.    The area thus outlined was incised deep to adipose tissue with a #15 scalpel blade.  The skin margins were undermined to an appropriate distance in all directions utilizing iris scissors. Dorsal Nasal Flap Text: The defect edges were debeveled with a #15 scalpel blade.  Given the location of the defect and the proximity to free margins a dorsal nasal flap was deemed most appropriate.  Using a sterile surgical marker, an appropriate dorsal nasal flap was drawn around the defect.    The area thus outlined was incised deep to adipose tissue with a #15 scalpel blade.  The skin margins were undermined to an appropriate distance in all directions utilizing iris scissors. Island Pedicle Flap Text: The defect edges were debeveled with a #15 scalpel blade.  Given the location of the defect, shape of the defect and the proximity to free margins an island pedicle advancement flap was deemed most appropriate.  Using a sterile surgical marker, an appropriate advancement flap was drawn incorporating the defect, outlining the appropriate donor tissue and placing the expected incisions within the relaxed skin tension lines where possible.    The area thus outlined was incised deep to adipose tissue with a #15 scalpel blade.  The skin margins were undermined to an appropriate distance in all directions around the primary defect and laterally outward around the island pedicle utilizing iris scissors.  There was minimal undermining beneath the pedicle flap. Island Pedicle Flap With Canthal Suspension Text: The defect edges were debeveled with a #15 scalpel blade.  Given the location of the defect, shape of the defect and the proximity to free margins an island pedicle advancement flap was deemed most appropriate.  Using a sterile surgical marker, an appropriate advancement flap was drawn incorporating the defect, outlining the appropriate donor tissue and placing the expected incisions within the relaxed skin tension lines where possible. The area thus outlined was incised deep to adipose tissue with a #15 scalpel blade.  The skin margins were undermined to an appropriate distance in all directions around the primary defect and laterally outward around the island pedicle utilizing iris scissors.  There was minimal undermining beneath the pedicle flap. A suspension suture was placed in the canthal tendon to prevent tension and prevent ectropion. Alar Island Pedicle Flap Text: The defect edges were debeveled with a #15 scalpel blade.  Given the location of the defect, shape of the defect and the proximity to the alar rim an island pedicle advancement flap was deemed most appropriate.  Using a sterile surgical marker, an appropriate advancement flap was drawn incorporating the defect, outlining the appropriate donor tissue and placing the expected incisions within the nasal ala running parallel to the alar rim. The area thus outlined was incised with a #15 scalpel blade.  The skin margins were undermined minimally to an appropriate distance in all directions around the primary defect and laterally outward around the island pedicle utilizing iris scissors.  There was minimal undermining beneath the pedicle flap. Double Island Pedicle Flap Text: The defect edges were debeveled with a #15 scalpel blade.  Given the location of the defect, shape of the defect and the proximity to free margins a double island pedicle advancement flap was deemed most appropriate.  Using a sterile surgical marker, an appropriate advancement flap was drawn incorporating the defect, outlining the appropriate donor tissue and placing the expected incisions within the relaxed skin tension lines where possible.    The area thus outlined was incised deep to adipose tissue with a #15 scalpel blade.  The skin margins were undermined to an appropriate distance in all directions around the primary defect and laterally outward around the island pedicle utilizing iris scissors.  There was minimal undermining beneath the pedicle flap. Island Pedicle Flap-Requiring Vessel Identification Text: The defect edges were debeveled with a #15 scalpel blade.  Given the location of the defect, shape of the defect and the proximity to free margins an island pedicle advancement flap was deemed most appropriate.  Using a sterile surgical marker, an appropriate advancement flap was drawn, based on the axial vessel mentioned above, incorporating the defect, outlining the appropriate donor tissue and placing the expected incisions within the relaxed skin tension lines where possible.    The area thus outlined was incised deep to adipose tissue with a #15 scalpel blade.  The skin margins were undermined to an appropriate distance in all directions around the primary defect and laterally outward around the island pedicle utilizing iris scissors.  There was minimal undermining beneath the pedicle flap. Keystone Flap Text: The defect edges were debeveled with a #15 scalpel blade.  Given the location of the defect, shape of the defect a keystone flap was deemed most appropriate.  Using a sterile surgical marker, an appropriate keystone flap was drawn incorporating the defect, outlining the appropriate donor tissue and placing the expected incisions within the relaxed skin tension lines where possible. The area thus outlined was incised deep to adipose tissue with a #15 scalpel blade.  The skin margins were undermined to an appropriate distance in all directions around the primary defect and laterally outward around the flap utilizing iris scissors. O-T Plasty Text: The defect edges were debeveled with a #15 scalpel blade.  Given the location of the defect, shape of the defect and the proximity to free margins an O-T plasty was deemed most appropriate.  Using a sterile surgical marker, an appropriate O-T plasty was drawn incorporating the defect and placing the expected incisions within the relaxed skin tension lines where possible.    The area thus outlined was incised deep to adipose tissue with a #15 scalpel blade.  The skin margins were undermined to an appropriate distance in all directions utilizing iris scissors. O-Z Plasty Text: The defect edges were debeveled with a #15 scalpel blade.  Given the location of the defect, shape of the defect and the proximity to free margins an O-Z plasty (double transposition flap) was deemed most appropriate.  Using a sterile surgical marker, the appropriate transposition flaps were drawn incorporating the defect and placing the expected incisions within the relaxed skin tension lines where possible.    The area thus outlined was incised deep to adipose tissue with a #15 scalpel blade.  The skin margins were undermined to an appropriate distance in all directions utilizing iris scissors.  Hemostasis was achieved with electrocautery.  The flaps were then transposed into place, one clockwise and the other counterclockwise, and anchored with interrupted buried subcutaneous sutures. Double O-Z Plasty Text: The defect edges were debeveled with a #15 scalpel blade.  Given the location of the defect, shape of the defect and the proximity to free margins a Double O-Z plasty (double transposition flap) was deemed most appropriate.  Using a sterile surgical marker, the appropriate transposition flaps were drawn incorporating the defect and placing the expected incisions within the relaxed skin tension lines where possible. The area thus outlined was incised deep to adipose tissue with a #15 scalpel blade.  The skin margins were undermined to an appropriate distance in all directions utilizing iris scissors.  Hemostasis was achieved with electrocautery.  The flaps were then transposed into place, one clockwise and the other counterclockwise, and anchored with interrupted buried subcutaneous sutures. V-Y Plasty Text: The defect edges were debeveled with a #15 scalpel blade.  Given the location of the defect, shape of the defect and the proximity to free margins an V-Y advancement flap was deemed most appropriate.  Using a sterile surgical marker, an appropriate advancement flap was drawn incorporating the defect and placing the expected incisions within the relaxed skin tension lines where possible.    The area thus outlined was incised deep to adipose tissue with a #15 scalpel blade.  The skin margins were undermined to an appropriate distance in all directions utilizing iris scissors. H Plasty Text: Given the location of the defect, shape of the defect and the proximity to free margins a H-plasty was deemed most appropriate for repair.  Using a sterile surgical marker, the appropriate advancement arms of the H-plasty were drawn incorporating the defect and placing the expected incisions within the relaxed skin tension lines where possible. The area thus outlined was incised deep to adipose tissue with a #15 scalpel blade. The skin margins were undermined to an appropriate distance in all directions utilizing iris scissors.  The opposing advancement arms were then advanced into place in opposite direction and anchored with interrupted buried subcutaneous sutures. W Plasty Text: The lesion was extirpated to the level of the fat with a #15 scalpel blade.  Given the location of the defect, shape of the defect and the proximity to free margins a W-plasty was deemed most appropriate for repair.  Using a sterile surgical marker, the appropriate transposition arms of the W-plasty were drawn incorporating the defect and placing the expected incisions within the relaxed skin tension lines where possible.    The area thus outlined was incised deep to adipose tissue with a #15 scalpel blade.  The skin margins were undermined to an appropriate distance in all directions utilizing iris scissors.  The opposing transposition arms were then transposed into place in opposite direction and anchored with interrupted buried subcutaneous sutures. Z Plasty Text: The lesion was extirpated to the level of the fat with a #15 scalpel blade.  Given the location of the defect, shape of the defect and the proximity to free margins a Z-plasty was deemed most appropriate for repair.  Using a sterile surgical marker, the appropriate transposition arms of the Z-plasty were drawn incorporating the defect and placing the expected incisions within the relaxed skin tension lines where possible.    The area thus outlined was incised deep to adipose tissue with a #15 scalpel blade.  The skin margins were undermined to an appropriate distance in all directions utilizing iris scissors.  The opposing transposition arms were then transposed into place in opposite direction and anchored with interrupted buried subcutaneous sutures. Zygomaticofacial Flap Text: Given the location of the defect, shape of the defect and the proximity to free margins a zygomaticofacial flap was deemed most appropriate for repair.  Using a sterile surgical marker, the appropriate flap was drawn incorporating the defect and placing the expected incisions within the relaxed skin tension lines where possible. The area thus outlined was incised deep to adipose tissue with a #15 scalpel blade with preservation of a vascular pedicle.  The skin margins were undermined to an appropriate distance in all directions utilizing iris scissors.  The flap was then placed into the defect and anchored with interrupted buried subcutaneous sutures. Cheek Interpolation Flap Text: A decision was made to reconstruct the defect utilizing an interpolation axial flap and a staged reconstruction.  A telfa template was made of the defect.  This telfa template was then used to outline the Cheek Interpolation flap.  The donor area for the pedicle flap was then injected with anesthesia.  The flap was excised through the skin and subcutaneous tissue down to the layer of the underlying musculature.  The interpolation flap was carefully excised within this deep plane to maintain its blood supply.  The edges of the donor site were undermined.   The donor site was closed in a primary fashion.  The pedicle was then rotated into position and sutured.  Once the tube was sutured into place, adequate blood supply was confirmed with blanching and refill.  The pedicle was then wrapped with xeroform gauze and dressed appropriately with a telfa and gauze bandage to ensure continued blood supply and protect the attached pedicle. Cheek-To-Nose Interpolation Flap Text: A decision was made to reconstruct the defect utilizing an interpolation axial flap and a staged reconstruction.  A telfa template was made of the defect.  This telfa template was then used to outline the Cheek-To-Nose Interpolation flap.  The donor area for the pedicle flap was then injected with anesthesia.  The flap was excised through the skin and subcutaneous tissue down to the layer of the underlying musculature.  The interpolation flap was carefully excised within this deep plane to maintain its blood supply.  The edges of the donor site were undermined.   The donor site was closed in a primary fashion.  The pedicle was then rotated into position and sutured.  Once the tube was sutured into place, adequate blood supply was confirmed with blanching and refill.  The pedicle was then wrapped with xeroform gauze and dressed appropriately with a telfa and gauze bandage to ensure continued blood supply and protect the attached pedicle. Interpolation Flap Text: A decision was made to reconstruct the defect utilizing an interpolation axial flap and a staged reconstruction.  A telfa template was made of the defect.  This telfa template was then used to outline the interpolation flap.  The donor area for the pedicle flap was then injected with anesthesia.  The flap was excised through the skin and subcutaneous tissue down to the layer of the underlying musculature.  The interpolation flap was carefully excised within this deep plane to maintain its blood supply.  The edges of the donor site were undermined.   The donor site was closed in a primary fashion.  The pedicle was then rotated into position and sutured.  Once the tube was sutured into place, adequate blood supply was confirmed with blanching and refill.  The pedicle was then wrapped with xeroform gauze and dressed appropriately with a telfa and gauze bandage to ensure continued blood supply and protect the attached pedicle. Melolabial Interpolation Flap Text: A decision was made to reconstruct the defect utilizing an interpolation axial flap and a staged reconstruction.  A telfa template was made of the defect.  This telfa template was then used to outline the melolabial interpolation flap.  The donor area for the pedicle flap was then injected with anesthesia.  The flap was excised through the skin and subcutaneous tissue down to the layer of the underlying musculature.  The pedicle flap was carefully excised within this deep plane to maintain its blood supply.  The edges of the donor site were undermined.   The donor site was closed in a primary fashion.  The pedicle was then rotated into position and sutured.  Once the tube was sutured into place, adequate blood supply was confirmed with blanching and refill.  The pedicle was then wrapped with xeroform gauze and dressed appropriately with a telfa and gauze bandage to ensure continued blood supply and protect the attached pedicle. Mastoid Interpolation Flap Text: A decision was made to reconstruct the defect utilizing an interpolation axial flap and a staged reconstruction.  A telfa template was made of the defect.  This telfa template was then used to outline the mastoid interpolation flap.  The donor area for the pedicle flap was then injected with anesthesia.  The flap was excised through the skin and subcutaneous tissue down to the layer of the underlying musculature.  The pedicle flap was carefully excised within this deep plane to maintain its blood supply.  The edges of the donor site were undermined.   The donor site was closed in a primary fashion.  The pedicle was then rotated into position and sutured.  Once the tube was sutured into place, adequate blood supply was confirmed with blanching and refill.  The pedicle was then wrapped with xeroform gauze and dressed appropriately with a telfa and gauze bandage to ensure continued blood supply and protect the attached pedicle. Posterior Auricular Interpolation Flap Text: A decision was made to reconstruct the defect utilizing an interpolation axial flap and a staged reconstruction.  A telfa template was made of the defect.  This telfa template was then used to outline the posterior auricular interpolation flap.  The donor area for the pedicle flap was then injected with anesthesia.  The flap was excised through the skin and subcutaneous tissue down to the layer of the underlying musculature.  The pedicle flap was carefully excised within this deep plane to maintain its blood supply.  The edges of the donor site were undermined.   The donor site was closed in a primary fashion.  The pedicle was then rotated into position and sutured.  Once the tube was sutured into place, adequate blood supply was confirmed with blanching and refill.  The pedicle was then wrapped with xeroform gauze and dressed appropriately with a telfa and gauze bandage to ensure continued blood supply and protect the attached pedicle. Paramedian Forehead Flap Text: A decision was made to reconstruct the defect utilizing an interpolation axial flap and a staged reconstruction.  A telfa template was made of the defect.  This telfa template was then used to outline the paramedian forehead pedicle flap.  The donor area for the pedicle flap was then injected with anesthesia.  The flap was excised through the skin and subcutaneous tissue down to the layer of the underlying musculature.  The pedicle flap was carefully excised within this deep plane to maintain its blood supply.  The edges of the donor site were undermined.   The donor site was closed in a primary fashion.  The pedicle was then rotated into position and sutured.  Once the tube was sutured into place, adequate blood supply was confirmed with blanching and refill.  The pedicle was then wrapped with xeroform gauze and dressed appropriately with a telfa and gauze bandage to ensure continued blood supply and protect the attached pedicle. Abbe Flap (Upper To Lower Lip) Text: The defect of the lower lip was assessed and measured.  Given the location and size of the defect, an Abbe flap was deemed most appropriate.  Using a sterile surgical marker, an appropriate Abbe flap was measured and drawn on the upper lip. Local anesthesia was then infiltrated.  A scalpel was then used to incise the upper lip through and through the skin, vermilion, muscle and mucosa, leaving the flap pedicled on the opposite side.  The flap was then rotated and transferred to the lower lip defect.  The flap was then sutured into place with a three layer technique, closing the orbicularis oris muscle layer with subcutaneous buried sutures, followed by a mucosal layer and an epidermal layer. Abbe Flap (Lower To Upper Lip) Text: The defect of the upper lip was assessed and measured.  Given the location and size of the defect, an Abbe flap was deemed most appropriate.  Using a sterile surgical marker, an appropriate Abbe flap was measured and drawn on the lower lip. Local anesthesia was then infiltrated. A scalpel was then used to incise the upper lip through and through the skin, vermilion, muscle and mucosa, leaving the flap pedicled on the opposite side.  The flap was then rotated and transferred to the lower lip defect.  The flap was then sutured into place with a three layer technique, closing the orbicularis oris muscle layer with subcutaneous buried sutures, followed by a mucosal layer and an epidermal layer. Estlander Flap (Upper To Lower Lip) Text: The defect of the lower lip was assessed and measured.  Given the location and size of the defect, an Estlander flap was deemed most appropriate.  Using a sterile surgical marker, an appropriate Estlander flap was measured and drawn on the upper lip. Local anesthesia was then infiltrated. A scalpel was then used to incise the lateral aspect of the flap, through skin, muscle and mucosa, leaving the flap pedicled medially.  The flap was then rotated and positioned to fill the lower lip defect.  The flap was then sutured into place with a three layer technique, closing the orbicularis oris muscle layer with subcutaneous buried sutures, followed by a mucosal layer and an epidermal layer. Cheiloplasty (Less Than 50%) Text: A decision was made to reconstruct the defect with a  cheiloplasty.  The defect was undermined extensively.  Additional orbicularis oris muscle was excised with a 15 blade scalpel.  The defect was converted into a full thickness wedge, of less than 50% of the vertical height of the lip, to facilite a better cosmetic result.  Small vessels were then tied off with 5-0 monocyrl. The orbicularis oris, superficial fascia, adipose and dermis were then reapproximated.  After the deeper layers were approximated the epidermis was reapproximated with particular care given to realign the vermilion border. Cheiloplasty (Complex) Text: A decision was made to reconstruct the defect with a  cheiloplasty.  The defect was undermined extensively.  Additional orbicularis oris muscle was excised with a 15 blade scalpel.  The defect was converted into a full thickness wedge to facilite a better cosmetic result.  Small vessels were then tied off with 5-0 monocyrl. The orbicularis oris, superficial fascia, adipose and dermis were then reapproximated.  After the deeper layers were approximated the epidermis was reapproximated with particular care given to realign the vermilion border. Ear Wedge Repair Text: A wedge excision was completed by carrying down an excision through the full thickness of the ear and cartilage with an inward facing Burow's triangle. The wound was then closed in a layered fashion. Full Thickness Lip Wedge Repair (Flap) Text: Given the location of the defect and the proximity to free margins a full thickness wedge repair was deemed most appropriate.  Using a sterile surgical marker, the appropriate repair was drawn incorporating the defect and placing the expected incisions perpendicular to the vermilion border.  The vermilion border was also meticulously outlined to ensure appropriate reapproximation during the repair.  The area thus outlined was incised through and through with a #15 scalpel blade.  The muscularis and dermis were reaproximated with deep sutures following hemostasis. Care was taken to realign the vermilion border before proceeding with the superficial closure.  Once the vermilion was realigned the superfical and mucosal closure was finished. Ftsg Text: The defect edges were debeveled with a #15 scalpel blade.  Given the location of the defect, shape of the defect and the proximity to free margins a full thickness skin graft was deemed most appropriate.  Using a sterile surgical marker, the primary defect shape was transferred to the donor site. The area thus outlined was incised deep to adipose tissue with a #15 scalpel blade.  The harvested graft was then trimmed of adipose tissue until only dermis and epidermis was left.  The skin margins of the secondary defect were undermined to an appropriate distance in all directions utilizing iris scissors.  The secondary defect was closed with interrupted buried subcutaneous sutures.  The skin edges were then re-apposed with running  sutures.  The skin graft was then placed in the primary defect and oriented appropriately. Split-Thickness Skin Graft Text: The defect edges were debeveled with a #15 scalpel blade.  Given the location of the defect, shape of the defect and the proximity to free margins a split thickness skin graft was deemed most appropriate.  Using a sterile surgical marker, the primary defect shape was transferred to the donor site. The split thickness graft was then harvested.  The skin graft was then placed in the primary defect and oriented appropriately. Burow's Graft Text: The defect edges were debeveled with a #15 scalpel blade.  Given the location of the defect, shape of the defect, the proximity to free margins and the presence of a standing cone deformity a Burow's skin graft was deemed most appropriate. The standing cone was removed and this tissue was then trimmed to the shape of the primary defect. The adipose tissue was also removed until only dermis and epidermis were left.  The skin margins of the secondary defect were undermined to an appropriate distance in all directions utilizing iris scissors.  The secondary defect was closed with interrupted buried subcutaneous sutures.  The skin edges were then re-apposed with running  sutures.  The skin graft was then placed in the primary defect and oriented appropriately. Cartilage Graft Text: The defect edges were debeveled with a #15 scalpel blade.  Given the location of the defect, shape of the defect, the fact the defect involved a full thickness cartilage defect a cartilage graft was deemed most appropriate.  An appropriate donor site was identified, cleansed, and anesthetized. The cartilage graft was then harvested and transferred to the recipient site, oriented appropriately and then sutured into place.  The secondary defect was then repaired using a primary closure. Composite Graft Text: The defect edges were debeveled with a #15 scalpel blade.  Given the location of the defect, shape of the defect, the proximity to free margins and the fact the defect was full thickness a composite graft was deemed most appropriate.  The defect was outline and then transferred to the donor site.  A full thickness graft was then excised from the donor site. The graft was then placed in the primary defect, oriented appropriately and then sutured into place.  The secondary defect was then repaired using a primary closure. Epidermal Autograft Text: The defect edges were debeveled with a #15 scalpel blade.  Given the location of the defect, shape of the defect and the proximity to free margins an epidermal autograft was deemed most appropriate.  Using a sterile surgical marker, the primary defect shape was transferred to the donor site. The epidermal graft was then harvested.  The skin graft was then placed in the primary defect and oriented appropriately. Dermal Autograft Text: The defect edges were debeveled with a #15 scalpel blade.  Given the location of the defect, shape of the defect and the proximity to free margins a dermal autograft was deemed most appropriate.  Using a sterile surgical marker, the primary defect shape was transferred to the donor site. The area thus outlined was incised deep to adipose tissue with a #15 scalpel blade.  The harvested graft was then trimmed of adipose and epidermal tissue until only dermis was left.  The skin graft was then placed in the primary defect and oriented appropriately. Skin Substitute Text: The defect edges were debeveled with a #15 scalpel blade.  Given the location of the defect, shape of the defect and the proximity to free margins a skin substitute graft was deemed most appropriate.  The graft material was trimmed to fit the size of the defect. The graft was then placed in the primary defect and oriented appropriately. Tissue Cultured Epidermal Autograft Text: The defect edges were debeveled with a #15 scalpel blade.  Given the location of the defect, shape of the defect and the proximity to free margins a tissue cultured epidermal autograft was deemed most appropriate.  The graft was then trimmed to fit the size of the defect.  The graft was then placed in the primary defect and oriented appropriately. Xenograft Text: The defect edges were debeveled with a #15 scalpel blade.  Given the location of the defect, shape of the defect and the proximity to free margins a xenograft was deemed most appropriate.  The graft was then trimmed to fit the size of the defect.  The graft was then placed in the primary defect and oriented appropriately. Purse String (Simple) Text: Given the location of the defect and the characteristics of the surrounding skin a purse string closure was deemed most appropriate.  Undermining was performed circumferentially around the surgical defect.  A purse string suture was then placed and tightened. Purse String (Intermediate) Text: Given the location of the defect and the characteristics of the surrounding skin a purse string intermediate closure was deemed most appropriate.  Undermining was performed circumferentially around the surgical defect.  A purse string suture was then placed and tightened. Partial Purse String (Simple) Text: Given the location of the defect and the characteristics of the surrounding skin a simple purse string closure was deemed most appropriate.  Undermining was performed circumferentially around the surgical defect.  A purse string suture was then placed and tightened. Wound tension only allowed a partial closure of the circular defect. Partial Purse String (Intermediate) Text: Given the location of the defect and the characteristics of the surrounding skin an intermediate purse string closure was deemed most appropriate.  Undermining was performed circumferentially around the surgical defect.  A purse string suture was then placed and tightened. Wound tension only allowed a partial closure of the circular defect. Localized Dermabrasion With Wire Brush Text: The patient was draped in routine manner.  Localized dermabrasion using 3 x 17 mm wire brush was performed in routine manner to papillary dermis. This spot dermabrasion is being performed to complete skin cancer reconstruction. It also will eliminate the other sun damaged precancerous cells that are known to be part of the regional effect of a lifetime's worth of sun exposure. This localized dermabrasion is therapeutic and should not be considered cosmetic in any regard. Tarsorrhaphy Text: A tarsorrhaphy was performed using Frost sutures. Intermediate Repair And Flap Additional Text (Will Appearing After The Standard Complex Repair Text): The intermediate repair was not sufficient to completely close the primary defect. The remaining additional defect was repaired with the flap mentioned below. Intermediate Repair And Graft Additional Text (Will Appearing After The Standard Complex Repair Text): The intermediate repair was not sufficient to completely close the primary defect. The remaining additional defect was repaired with the graft mentioned below. Complex Repair And Flap Additional Text (Will Appearing After The Standard Complex Repair Text): The complex repair was not sufficient to completely close the primary defect. The remaining additional defect was repaired with the flap mentioned below. Complex Repair And Graft Additional Text (Will Appearing After The Standard Complex Repair Text): The complex repair was not sufficient to completely close the primary defect. The remaining additional defect was repaired with the graft mentioned below. Manual Repair Warning Statement: We plan on removing the manually selected variable below in favor of our much easier automatic structured text blocks found in the previous tab. We decided to do this to help make the flow better and give you the full power of structured data. Manual selection is never going to be ideal in our platform and I would encourage you to avoid using manual selection from this point on, especially since I will be sunsetting this feature. It is important that you do one of two things with the customized text below. First, you can save all of the text in a word file so you can have it for future reference. Second, transfer the text to the appropriate area in the Library tab. Lastly, if there is a flap or graft type which we do not have you need to let us know right away so I can add it in before the variable is hidden. No need to panic, we plan to give you roughly 6 months to make the change. Same Histology In Subsequent Stages Text: The pattern and morphology of the tumor is as described in the first stage. No Residual Tumor Seen Histology Text: There were no malignant cells seen in the sections examined. Inflammation Suggestive Of Cancer Camouflage Histology Text: There was a dense lymphocytic infiltrate which prevented adequate histologic evaluation of adjacent structures. Bcc Histology Text: There were numerous aggregates of basaloid cells. Bcc Infiltrative Histology Text: There were numerous aggregates of basaloid cells demonstrating an infiltrative pattern. Mart-1 - Positive Histology Text: MART-1 staining demonstrates areas of higher density and clustering of melanocytes with Pagetoid spread upwards within the epidermis. The surgical margins are positive for tumor cells. Mart-1 - Negative Histology Text: MART-1 staining demonstrates a normal density and pattern of melanocytes along the dermal-epidermal junction. The surgical margins are negative for tumor cells. Information: Selecting Yes will display possible errors in your note based on the variables you have selected. This validation is only offered as a suggestion for you. PLEASE NOTE THAT THE VALIDATION TEXT WILL BE REMOVED WHEN YOU FINALIZE YOUR NOTE. IF YOU WANT TO FAX A PRELIMINARY NOTE YOU WILL NEED TO TOGGLE THIS TO 'NO' IF YOU DO NOT WANT IT IN YOUR FAXED NOTE.

## 2025-06-17 NOTE — SWALLOW BEDSIDE ASSESSMENT ADULT - SWALLOW EVAL: DIAGNOSIS
+toleration observed without overt s/s of aspiration/penetration for puree, minced + moist, soft + bite sized, and regular consistencies + thin liquids

## 2025-06-17 NOTE — SWALLOW BEDSIDE ASSESSMENT ADULT - SLP PERTINENT HISTORY OF CURRENT PROBLEM
73 yo male PMHx sig for OA, HTN, DM ii, scc of tongue, AS, prostate cancer w mets to bone, and obesity, recent L hip replacement.  Pt presented w acute aphasia, evaluated in the ed and telestroke, pt was administered TNK.

## 2025-06-17 NOTE — OCCUPATIONAL THERAPY INITIAL EVALUATION ADULT - PERTINENT HX OF CURRENT PROBLEM, REHAB EVAL
73 yo male PMHx sig for OA, HTN, DM ii, scc of tongue, AS, prostate cancer w mets to bone, and obesity, recent L hip replacement. Pt presented w acute aphasia, evaluated in the ed and telestroke, pt was administered TNK.    Pt s/p TNK  CT HEAD: No significant change since recent same day head CT. No acute intracranial pathology. Chronic appearing left cerebellar infarct.    MRI pending

## 2025-06-17 NOTE — OCCUPATIONAL THERAPY INITIAL EVALUATION ADULT - LIVES WITH, PROFILE
wife in a private home +6 steps to enter without handrail then level inside +walk-in-shower +suction cups +standard toilet/spouse

## 2025-06-17 NOTE — SWALLOW BEDSIDE ASSESSMENT ADULT - ASR SWALLOW ASPIRATION MONITOR
change of breathing pattern/cough/gurgly voice/pneumonia/throat clearing change of breathing pattern/cough/gurgly voice/fever/pneumonia/throat clearing/upper respiratory infection

## 2025-06-17 NOTE — CONSULT NOTE ADULT - ASSESSMENT
75yo RHM PMH prostate cancer with mets to bones, obesity, recent L hip replacement, OA, HTN, DMII, SCC of tongue, AS presented to ED for aphasia. In ED, CTH reports chronic appearing left cerebellar infarct. 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 S/p TNK. Currently mostly back to baseline, with exception of some lingering mild confusion. On exam, no focal neuro deficits. NIHSS=0. Etiology likely stroke.     Heme/Onc consulted for patient with hx of prostate cancer on Orgovyx and Nubeqa. Pharmacy requesting oncology consult, for approval to continue home meds.    #acute CVA      PRELIM  73yo RHM PMH prostate cancer with mets to bones, obesity, recent L hip replacement, OA, HTN, DMII, SCC of tongue, AS presented to ED for aphasia. In ED, CTH reports chronic appearing left cerebellar infarct. 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 S/p TNK. Currently mostly back to baseline, with exception of some lingering mild confusion. On exam, no focal neuro deficits. NIHSS=0. Etiology likely stroke.     Heme/Onc consulted for patient with hx of prostate cancer on Orgovyx and Nubeqa. Pharmacy requesting oncology consult, for approval to continue home meds.    #acute CVA  - neuro on board    #prostate cancer mets to bone diagnosed 2023  - Orgovyx     PRELIM  73yo RHM PMH prostate cancer with mets to bones, obesity, recent L hip replacement, OA, HTN, DMII, SCC of tongue, AS presented to ED for aphasia. In ED, CTH reports chronic appearing left cerebellar infarct. 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 S/p TNK. Currently mostly back to baseline, with exception of some lingering mild confusion. On exam, no focal neuro deficits. NIHSS=0. Etiology likely stroke.     Heme/Onc consulted for patient with hx of prostate cancer on Orgovyx and Nubeqa. Pharmacy requesting oncology consult, for approval to continue home meds.    #acute CVA  - per neuro recs DAPT x21 days then ASA 81mg monotherap    #prostate cancer mets to bones diagnosed 2023  - on Orgovyx 120mg daily and Nubeqa 300mg BID  - follows with Dr. Lord in [facility]     PLAN:   - please continue Orgovyx and Nubeqa as prescribed   - kindly f/u with his oncologist upon d/c home  - f/u neuro recs    The patient seen with Dr. Aguilera

## 2025-06-17 NOTE — CONSULT NOTE ADULT - SUBJECTIVE AND OBJECTIVE BOX
NEUROLOGY CONSULT    HPI: 75yo RHM PMH prostate cancer with mets to bones, obesity, recent L hip replacement, OA, HTN, DMII, SCC of tongue, AS presented to ED for aphasia. He notes he was sitting at home when all of a sudden he felt as if he could not get his words out. He felt slightly confused as well. He was brought to ED, symptoms persisted. Stroke team evaluated patient and he was given TNK. This morning he feels basically back to baseline, maybe still slightly confused.     He is on ASA 81mg qd at home, and sees PCP regularly. He is compliant with Rx.      MEDICATIONS  Home Medications:  atenolol 25 mg oral tablet: 1 tab(s) orally once a day (16 May 2025 08:46)  Caltrate 600+D oral tablet, chewable: 1 tab(s) chewed once a day (16 May 2025 08:46)  cholecalciferol 125 mcg (5000 intl units) oral tablet: 1 tab(s) orally once a day (16 May 2025 08:46)  glimepiride 2 mg oral tablet: 1 tab(s) orally once a day (16 May 2025 08:46)  lisinopril 10 mg oral tablet: 1 tab(s) orally once a day (16 May 2025 08:46)  meloxicam 15 mg oral tablet: 1 tab(s) orally once a day (16 May 2025 08:46)  metFORMIN 500 mg oral tablet: 1 tab(s) orally 2 in the am, 1 in the pm (16 May 2025 08:46)  Nubeqa 300 mg oral tablet: 2 tab(s) orally 2 times a day (16 May 2025 08:46)  Orgovyx 120 mg oral tablet: 1 tab(s) orally once a day (16 May 2025 08:46)  tamsulosin 0.4 mg oral capsule: 1 cap(s) orally once a day (16 May 2025 08:46)  Trulicity Pen 0.75 mg/0.5 mL subcutaneous solution: 0.75 milligram(s) subcutaneously once a week on mondays (16 May 2025 08:46)    MEDICATIONS  (STANDING):  aspirin Suppository 300 milliGRAM(s) Rectal daily  atorvastatin 80 milliGRAM(s) Oral at bedtime  chlorhexidine 2% Cloths 1 Application(s) Topical <User Schedule>  niCARdipine Infusion 5 mG/Hr (25 mL/Hr) IV Continuous <Continuous>  sodium chloride 0.9% lock flush 10 milliLiter(s) IV Push once  sodium chloride 0.9% lock flush 10 milliLiter(s) IV Push once  sodium chloride 0.9%. 1000 milliLiter(s) (75 mL/Hr) IV Continuous <Continuous>    MEDICATIONS  (PRN):  acetaminophen     Tablet .. 650 milliGRAM(s) Oral every 6 hours PRN Mild Pain (1 - 3)      FAMILY HISTORY:  FH: lung cancer (Sibling)      SOCIAL HISTORY: negative for tobacco, alcohol, or illicit drug use.    Allergies    No Known Allergies        GEN: NAD, pleasant, cooperative    NEURO:   MENTAL STATUS: AAOx3  LANG/SPEECH: Fluent, intact naming, repetition & comprehension  CRANIAL NERVES:  II: Pupils equal round and reactive, no RAPD, normal visual fields  III, IV, VI: EOM intact, no gaze preference or deviation  V: normal  VII: no facial asymmetry  VIII: normal hearing to speech  MOTOR: Drift in LLE (likely 2/2 recent hip surgery)   REFLEXES: downgoing toes   SENSORY: Normal to light touch. No neglect   COORD: Normal finger to nose and heel to shin, no tremor, no dysmetria    NIHSS: 2 (LLE drift, likely 2/2 recent hip surgery)    LABS:                        12.7   5.17  )-----------( 224      ( 16 Jun 2025 15:55 )             37.9     06-16    136  |  101  |  18  ----------------------------<  192[H]  6.2[HH]   |  21  |  0.9    Ca    9.3      16 Jun 2025 15:55    TPro  6.8  /  Alb  4.2  /  TBili  0.3  /  DBili  x   /  AST  65[H]  /  ALT  22  /  AlkPhos  80  06-16    Hemoglobin A1C:   Vitamin B12   PT/INR - ( 16 Jun 2025 15:55 )   PT: 10.70 sec;   INR: 0.91 ratio         PTT - ( 16 Jun 2025 15:55 )  PTT:33.8 sec  CAPILLARY BLOOD GLUCOSE  214 (16 Jun 2025 21:52)      POCT Blood Glucose.: 196 mg/dL (16 Jun 2025 16:11)      Urinalysis Basic - ( 16 Jun 2025 15:55 )    Color: x / Appearance: x / SG: x / pH: x  Gluc: 192 mg/dL / Ketone: x  / Bili: x / Urobili: x   Blood: x / Protein: x / Nitrite: x   Leuk Esterase: x / RBC: x / WBC x   Sq Epi: x / Non Sq Epi: x / Bacteria: x        RADIOLOGY  < from: CT Head No Cont (06.16.25 @ 18:34) >  IMPRESSION:  No significant change since recent same day head CT.    Noacute intracranial pathology.    Chronic appearing left cerebellar infarct.    Communication: The summary of above findings were discussed with readback   confirmation with Dr. Cervantes by resident Jose Coyne MD on 6/16/2025 at   6:49 PM.    --- End of Report ---    < end of copied text >    < from: CT Brain Stroke Protocol (06.16.25 @ 16:22) >    IMPRESSION:  No CT evidence of large acute territorial infarct or acute intracranial   pathology.    Chronic appearing left cerebellar infarct, new since the prior   examination.    These findings were discussed with BRAYAN TRIANA [phone] at   6/16/2025 4:34 PM by Dr. Liu with read back confirmation.    --- End of Report ---    < end of copied text >    < from: CT Angio Brain Stroke Protocol  w/ IV Cont (06.16.25 @ 18:34) >  IMPRESSION:    CT PERFUSION:  The perfusion scan demonstrates 30 mL perfusion abnormalities in the   region of the left temporal occipital region core infarct.    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    Intracranial vessels are otherwise patent and unremarkable    No carotid or vertebral artery stenosis.    --- End of Report ---    < end of copied text >            
HPI: 75 yo male PMHx significant   for OA, HTN, DM ii, scc of tongue, AS, prostate cancer w mets to bone, and obesity, recent Lt  hip replacement.  Pt presented w acute aphasia, evaluated in the ed and tele stroke, pt was administered TNK. ptn  seen and exam at  bed  side  fluent speaking  english  nad  c/o  lt  hip  pain  but nad  fu  command,ptn  labs  , imaging and  medical  notes are appreciated  and  reviewed  .      PTN  REFERRED TO ACUTE  REHAB  FOR  EVAL AND  TX   PAST MEDICAL & SURGICAL HISTORY:  HTN (hypertension)      DM2 (diabetes mellitus, type 2)      Squamous cell cancer of tongue      Prostate cancer      OA (osteoarthritis)      History of TIAs      History of aortic stenosis      Prostate cancer metastatic to bone      Former smoker      Obesity (BMI 30-39.9)      History of hepatitis C      Neoplasm of tongue      H/O knee surgery      History of tonsillectomy          Hospital Course:    TODAY'S SUBJECTIVE & REVIEW OF SYMPTOMS:     Constitutional WNL   Cardio WNL   Resp WNL   GI WNL  Heme WNL  Endo WNL  Skin WNL  MSK WNL  Neuro WNL  Cognitive WNL  Psych WNL      MEDICATIONS  (STANDING):  aspirin Suppository 300 milliGRAM(s) Rectal daily  atorvastatin 80 milliGRAM(s) Oral at bedtime  chlorhexidine 2% Cloths 1 Application(s) Topical <User Schedule>  niCARdipine Infusion 5 mG/Hr (25 mL/Hr) IV Continuous <Continuous>  sodium chloride 0.9% lock flush 10 milliLiter(s) IV Push once  sodium chloride 0.9% lock flush 10 milliLiter(s) IV Push once  sodium chloride 0.9%. 1000 milliLiter(s) (75 mL/Hr) IV Continuous <Continuous>    MEDICATIONS  (PRN):  acetaminophen     Tablet .. 650 milliGRAM(s) Oral every 6 hours PRN Mild Pain (1 - 3)      FAMILY HISTORY:  FH: lung cancer (Sibling)        Allergies    No Known Allergies    Intolerances        SOCIAL HISTORY:    [  ] Etoh  [  ] Smoking  [  ] Substance abuse     Home Environment:  [  ] Home Alone  [ x ] Lives with Family  [  ] Home Health Aid    Dwelling:  [  ] Apartment  [  x] Private House  [  ] Adult Home  [  ] Skilled Nursing Facility      [  ] Short Term  [  ] Long Term  [ x ] Stairs       Elevator [  ]    FUNCTIONAL STATUS PTA: (Check all that apply)  Ambulation: [ x  ]Independent    [  ] Dependent     [  ] Non-Ambulatory  Assistive Device: [  ] SA Cane  [  ]  Q Cane  [ x ] Walker  [  ]  Wheelchair  ADL : [  x] Independent  [  ]  Dependent       Vital Signs Last 24 Hrs  T(C): 36.9 (17 Jun 2025 04:00), Max: 38.9 (16 Jun 2025 23:13)  T(F): 98.5 (17 Jun 2025 04:00), Max: 102.1 (16 Jun 2025 23:13)  HR: 65 (17 Jun 2025 07:00) (52 - 74)  BP: 154/69 (17 Jun 2025 07:00) (134/62 - 214/94)  BP(mean): --  RR: 18 (17 Jun 2025 00:43) (16 - 20)  SpO2: 97% (17 Jun 2025 07:00) (65% - 100%)    Parameters below as of 17 Jun 2025 07:00  Patient On (Oxygen Delivery Method): room air          PHYSICAL EXAM: Alert & Oriented X3  GENERAL: NAD, well-groomed, well-developed  HEAD:  Atraumatic, Normocephalic  EYES: EOMI, PERRLA, conjunctiva and sclera clear  NECK: Supple, No JVD, Normal thyroid  CHEST/LUNG: Clear to percussion bilaterally; No rales, rhonchi, wheezing, or rubs  HEART: Regular rate and rhythm; No murmurs, rubs, or gallops  ABDOMEN: Soft, Nontender, Nondistended; Bowel sounds present  EXTREMITIES:  2+ Peripheral Pulses, No clubbing, cyanosis, or edema    NERVOUS SYSTEM:  Cranial Nerves 2-12 intact [ x ] Abnormal  [  ]  ROM: WFL all extremities [x  ]  Abnormal [  ]  Motor Strength: WFL all extremities  [x  ]  Abnormal [  ]  Sensation: intact to light touch [ x ] Abnormal [  ]  Reflexes: Symmetric [ x ]  Abnormal [  ]    FUNCTIONAL STATUS:  Bed Mobility: Independent [  ]  Supervision [  ]  Needs Assistance [x  ]  N/A [  ]  Transfers: Independent [  ]  Supervision [  ]  Needs Assistance [x  ]  N/A [  ]   Ambulation: Independent [  ]  Supervision [  ]  Needs Assistance [x  ]  N/A [  ]  ADL: Independent [  ] Requires Assistance [  ] N/A [ x ]  SEE PT/OT IE NOTES    LABS:                        12.7   5.17  )-----------( 224      ( 16 Jun 2025 15:55 )             37.9     06-16    136  |  101  |  18  ----------------------------<  192[H]  6.2[HH]   |  21  |  0.9    Ca    9.3      16 Jun 2025 15:55    TPro  6.8  /  Alb  4.2  /  TBili  0.3  /  DBili  x   /  AST  65[H]  /  ALT  22  /  AlkPhos  80  06-16    PT/INR - ( 16 Jun 2025 15:55 )   PT: 10.70 sec;   INR: 0.91 ratio         PTT - ( 16 Jun 2025 15:55 )  PTT:33.8 sec  Urinalysis Basic - ( 16 Jun 2025 15:55 )    Color: x / Appearance: x / SG: x / pH: x  Gluc: 192 mg/dL / Ketone: x  / Bili: x / Urobili: x   Blood: x / Protein: x / Nitrite: x   Leuk Esterase: x / RBC: x / WBC x   Sq Epi: x / Non Sq Epi: x / Bacteria: x        RADIOLOGY & ADDITIONAL STUDIES:< from: CT Head No Cont (06.16.25 @ 18:34) >  IMPRESSION:  No significant change since recent same day head CT.    Noacute intracranial pathology.    Chronic appearing left cerebellar infarct.    < end of copied text >      Assesment:
Hematology Consult Note    HPI:  Pt is a 75 yo male PMHx sig for OA, HTN, DM ii, scc of tongue, AS, prostate cancer w mets to bone, and obesity, recent L hip replacement.  Pt presented w acute aphasia, evaluated in the ed and telestroke, pt was administered TNK. (16 Jun 2025 21:40)      Allergies    No Known Allergies    Intolerances        MEDICATIONS  (STANDING):  atorvastatin 80 milliGRAM(s) Oral at bedtime  chlorhexidine 2% Cloths 1 Application(s) Topical <User Schedule>  niCARdipine Infusion 5 mG/Hr (25 mL/Hr) IV Continuous <Continuous>  sodium chloride 0.9% lock flush 10 milliLiter(s) IV Push once  sodium chloride 0.9% lock flush 10 milliLiter(s) IV Push once  sodium chloride 0.9%. 1000 milliLiter(s) (75 mL/Hr) IV Continuous <Continuous>    MEDICATIONS  (PRN):  acetaminophen     Tablet .. 650 milliGRAM(s) Oral every 6 hours PRN Mild Pain (1 - 3)      PAST MEDICAL & SURGICAL HISTORY:  HTN (hypertension)      DM2 (diabetes mellitus, type 2)      Squamous cell cancer of tongue      Prostate cancer      OA (osteoarthritis)      History of TIAs      History of aortic stenosis      Prostate cancer metastatic to bone      Former smoker      Obesity (BMI 30-39.9)      History of hepatitis C      Neoplasm of tongue      H/O knee surgery      History of tonsillectomy          FAMILY HISTORY:  FH: lung cancer (Sibling)        SOCIAL HISTORY: No EtOH, no tobacco    REVIEW OF SYSTEMS:    CONSTITUTIONAL: No weakness, fevers or chills  EYES/ENT: No visual changes;  No vertigo or throat pain   NECK: No pain or stiffness  RESPIRATORY: No cough, wheezing, hemoptysis; No shortness of breath  CARDIOVASCULAR: No chest pain or palpitations  GASTROINTESTINAL: No abdominal or epigastric pain. No nausea, vomiting, or hematemesis; No diarrhea or constipation. No melena or hematochezia.  GENITOURINARY: No dysuria, frequency or hematuria  NEUROLOGICAL: No numbness or weakness  SKIN: No itching, burning, rashes, or lesions   All other review of systems is negative unless indicated above.    Height (cm): 185.4 (06-17 @ 09:00)  Weight (kg): 100.4 (06-17 @ 09:00)  BMI (kg/m2): 29.2 (06-17 @ 09:00)  BSA (m2): 2.25 (06-17 @ 09:00)    T(F): 97.3 (06-17-25 @ 11:36), Max: 102.1 (06-16-25 @ 23:13)  HR: 63 (06-17-25 @ 14:00)  BP: 133/71 (06-17-25 @ 14:00)  RR: 18 (06-17-25 @ 14:00)  SpO2: 96% (06-17-25 @ 14:00)  Wt(kg): --    GENERAL: NAD, well-developed  HEAD:  Atraumatic, Normocephalic  EYES: EOMI, PERRLA, conjunctiva and sclera clear  NECK: Supple, No JVD  CHEST/LUNG: Clear to auscultation bilaterally; No wheeze  HEART: Regular rate and rhythm; No murmurs, rubs, or gallops  ABDOMEN: Soft, Nontender, Nondistended; Bowel sounds present  EXTREMITIES:  2+ Peripheral Pulses, No clubbing, cyanosis, or edema  NEUROLOGY: non-focal  SKIN: No rashes or lesions                          12.7   5.17  )-----------( 224      ( 16 Jun 2025 15:55 )             37.9       06-16    136  |  101  |  18  ----------------------------<  192[H]  6.2[HH]   |  21  |  0.9    Ca    9.3      16 Jun 2025 15:55    TPro  6.8  /  Alb  4.2  /  TBili  0.3  /  DBili  x   /  AST  65[H]  /  ALT  22  /  AlkPhos  80  06-16

## 2025-06-17 NOTE — SWALLOW BEDSIDE ASSESSMENT ADULT - COMMENTS
CT Head 6/16 16:22: No CT evidence of large acute territorial infarct or acute intracranial pathology. Chronic appearing left cerebellar infarct, new since the prior examination.  CT Head 6/16 18:34: No significant change since recent same day head CT.  < from: CT Brain Perfusion Maps Stroke (06.16.25 @ 18:34) > CT PERFUSION: The perfusion scan demonstrates 30 mL perfusion abnormalities in the   region of the left temporal occipital region core infarct.  CTA HEAD/NECK: A 2.5 mm saccular aneurysm is noted arising from the right M1 segment of the MCA, in retrospect stable.

## 2025-06-17 NOTE — CONSULT NOTE ADULT - ASSESSMENT
IMPRESSION: Rehab of 74 y.o m rehab  for  TIA OA  sp  lt thr     PRECAUTIONS: [  ] Cardiac  [  ] Respiratory  [  ] Seizures [  ] Contact Isolation  [  ] Droplet Isolation  [ FALL ] Other    Weight Bearing Status:     RECOMMENDATION:    Out of Bed to Chair     DVT/Decubiti Prophylaxis    REHAB PLAN:     [ x  ] Bedside P/T 3-5 times a week   [ x  ]   Bedside O/T  2-3 times a week             [   ] No Rehab Therapy Indicated                   [x]  Speech Therapy   Conditioning/ROM                                    ADL  Bed Mobility                                               Conditioning/ROM  Transfers                                                     Bed Mobility  Sitting /Standing Balance                         Transfers                                        Gait Training                                               Sitting/Standing Balance  Stair Training [   ]Applicable                    Home equipment Eval                                                                        Splinting  [   ] Only      GOALS:   ADL   [  x ]   Independent                    Transfers  [ x  ] Independent                          Ambulation  [  x ] Independent     [    ] With device                            [   ]  CG                                                         [   ]  CG                                                                  [   ] CG                            [    ] Min A                                                   [   ] Min A                                                              [   ] Min  A          DISCHARGE PLAN:   [   ]  Good candidate for Intensive Rehabilitation/Hospital based-4A [facility]                                             Will tolerate 3hrs Intensive Rehab Daily                                       [    ]  Short Term Rehab in Skilled Nursing Facility                                       [  xx  ]  Home with Outpatient or VN services d/w  ptn out  ptn  rehab  program ptn agree and  c ont current care                                          [    ]  Possible Candidate for Intensive Hospital based Rehab

## 2025-06-17 NOTE — CONSULT NOTE ADULT - ATTENDING COMMENTS
Patient was seen today 6/18/2025  via telemedicine visit by BRAYAN Paez was at bedside.  Events noteded MRI did not demonstrate any new infarct or intracranial hemorrhage there was a small chronic infarct within the left cerebellar hemisphere which was new since 3/8/2023.  His medications were adjusted by neurology and I explained to the patient and I have no objections for him to continue his treatment for his metastatic prostate cancer to the bone and lymph nodes with Orgovyx and Nubeqa and he could restart it as soon as he gets home.  He will be discharged today

## 2025-06-17 NOTE — PHYSICAL THERAPY INITIAL EVALUATION ADULT - PERTINENT HX OF CURRENT PROBLEM, REHAB EVAL
Pt is a 75 yo male PMHx sig for OA, HTN, DM ii, scc of tongue, AS, prostate cancer w mets to bone, and obesity, recent L hip replacement.  Pt presented w acute aphasia, evaluated in the ed and telestroke, pt was administered TNK.  #r/o CVA

## 2025-06-17 NOTE — SWALLOW BEDSIDE ASSESSMENT ADULT - SLP GENERAL OBSERVATIONS
Pt received awake, alert, AOx4, O2 via room air. Pt received awake, alert, AOx4, O2 via room air. Expressive/receptive language intact in extended discourse. Pt reports mild anomia, not observed. Speech is clear.

## 2025-06-17 NOTE — PATIENT PROFILE ADULT - FALL HARM RISK - HARM RISK INTERVENTIONS

## 2025-06-17 NOTE — CONSULT NOTE ADULT - NS ATTEND AMEND GEN_ALL_CORE FT
75yo RHM PMH prostate cancer with mets to bones, obesity, recent L hip replacement, OA, HTN, DMII, SCC of tongue, AS p/w acute aphasia s/p TNK with resolution of symptoms possible stroke.  Recommend continue post-TNK protocol and if rpt HCT (-) for ICH, may start ASA 81, Plavix 75 QD and continue atorvastatin 80.  Continue stroke w/u and Q4 neurochecks.

## 2025-06-17 NOTE — CONSULT NOTE ADULT - CONSULT REASON
Patient with hx of prostate cancer on Orgovyx and Nubeqa. Pharmacy requesting oncology consult, for approval to continue home meds.

## 2025-06-17 NOTE — PROGRESS NOTE ADULT - SUBJECTIVE AND OBJECTIVE BOX
Patient seen and evaluated this am, speaking clearly this am, c/o L hip pain      T(F): 98.5 (06-17-25 @ 04:00), Max: 102.1 (06-16-25 @ 23:13)  HR: 65 (06-17-25 @ 07:00)  BP: 154/69 (06-17-25 @ 07:00)  RR: 18 (06-17-25 @ 00:43)  SpO2: 97% (06-17-25 @ 07:00) (65% - 100%)    PHYSICAL EXAM:  GENERAL: NAD  HEAD:  Atraumatic, Normocephalic  EYES: EOMI, PERRLA, conjunctiva and sclera clear  NERVOUS SYSTEM:  Alert & Oriented X3, no focal deficits   CHEST/LUNG: Clear to percussion bilaterally; No rales, rhonchi, wheezing, or rubs  HEART: Regular rate and rhythm; No murmurs, rubs, or gallops  ABDOMEN: Soft, Nontender, Nondistended; Bowel sounds present  EXTREMITIES:  2+ Peripheral Pulses, No clubbing, cyanosis, or edema    LABS  06-16    136  |  101  |  18  ----------------------------<  192[H]  6.2[HH]   |  21  |  0.9    Ca    9.3      16 Jun 2025 15:55    TPro  6.8  /  Alb  4.2  /  TBili  0.3  /  DBili  x   /  AST  65[H]  /  ALT  22  /  AlkPhos  80  06-16                          12.7   5.17  )-----------( 224      ( 16 Jun 2025 15:55 )             37.9     PT/INR - ( 16 Jun 2025 15:55 )   PT: 10.70 sec;   INR: 0.91 ratio         PTT - ( 16 Jun 2025 15:55 )  PTT:33.8 sec    TTE - prelim normal lv function    < from: 12 Lead ECG (06.16.25 @ 16:23) >    Diagnosis Line    Sinus bradycardia  Otherwise normal ECG    < end of copied text >    RADIOLOGY  < from: CT Brain Stroke Protocol (06.16.25 @ 16:22) >    IMPRESSION:  No CT evidence of large acute territorial infarct or acute intracranial   pathology.    Chronic appearing left cerebellar infarct, new since the prior   examination.    < end of copied text >  < from: CT Angio Brain Stroke Protocol  w/ IV Cont (06.16.25 @ 18:34) >  IMPRESSION:    CT PERFUSION:  The perfusion scan demonstrates 30 mL perfusion abnormalities in the   region of the left temporal occipital region core infarct.    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    Intracranial vessels are otherwise patent and unremarkable    No carotid or vertebral artery stenosis.    < end of copied text >  < from: CT Head No Cont (06.16.25 @ 18:34) >    IMPRESSION:  No significant change since recent same day head CT.    Noacute intracranial pathology.    Chronic appearing left cerebellar infarct.      < end of copied text >    MEDICATIONS  (STANDING):  aspirin Suppository 300 milliGRAM(s) Rectal daily  atorvastatin 80 milliGRAM(s) Oral at bedtime  chlorhexidine 2% Cloths 1 Application(s) Topical <User Schedule>  niCARdipine Infusion 5 mG/Hr (25 mL/Hr) IV Continuous <Continuous>  sodium chloride 0.9%. 1000 milliLiter(s) (75 mL/Hr) IV Continuous <Continuous>    MEDICATIONS  (PRN):  acetaminophen     Tablet .. 650 milliGRAM(s) Oral every 6 hours PRN Mild Pain (1 - 3)        Pending/Follow up items (tests, labs, procedures,consults):    Indication for continued inpatient management:    Goals of Care note:     Family contact:  Dispo (home, SNF, etc):    Prepare for DC order [x] - updated DWAYNE is:   DC provider note prep:    -------------------------------Time spent-----------------------------------------------------------------------  Initial visit:             mins [ ] -- 55-74 mins [ ]  Subsequent visit: 50-79 mins[ ]    -- 35-49mins [ ]     --------------------------------# and complexity of problems---------------------------------------------  [ ] chronic illness with severe exacerbation , progression, treatment side effect  [ ] acute or chronic illness with threat to life or bodily funtion                         --------------------------------Complexity of data reviewed ----------------------------------------------  The Patients complexity of data reviewed  is Low [ ] Moderate [ ] High [ ] due to the following:   A:      Reviewed prior external records [ ]      Considering/ Ordered a unique test:  Labs [x ] Imaging [x ] Stress Test  [ ] Other: Specify [ ]      Reviewed each unique test result [x ]      Assessment requiring an independent historian [ ]  B:       Independent interpretation of:   X-Ray [x ] EKG [ ]  Other: Specify  [ ]  C:       Discussion of management of tests with clinician outside of my group [ ]    -------------------------------------Risk of Morbidity-------------------------------------------------------  The Patients risk of morbidity is Low [ ] Moderate [ ] High [ ] due to the following:   Mod:  Prescription Drug Management [ ]  Decision regarding elective or emergent: minor surgery [ ] major surgery [ ]  Decision regarding hospitalization or escalation of hospital-level care [ ]  SDOH: Hearing/Vision impaired [ ] Food insecurity [ ] Homelessness/unsafe condition [ ]   Financial strain [ ] un/underemployed [ ] Lack of Transport [ ]  High:  DNR or De-Escalation of care because of poor prognosis [ ]  Drug Therapy requiring intensive monitoring for toxicity [ ]  Parenteral controlled substance [ ]

## 2025-06-18 ENCOUNTER — TRANSCRIPTION ENCOUNTER (OUTPATIENT)
Age: 75
End: 2025-06-18

## 2025-06-18 VITALS
SYSTOLIC BLOOD PRESSURE: 146 MMHG | RESPIRATION RATE: 22 BRPM | HEART RATE: 71 BPM | OXYGEN SATURATION: 98 % | DIASTOLIC BLOOD PRESSURE: 79 MMHG

## 2025-06-18 LAB
ALBUMIN SERPL ELPH-MCNC: 4.1 G/DL — SIGNIFICANT CHANGE UP (ref 3.5–5.2)
ALP SERPL-CCNC: 71 U/L — SIGNIFICANT CHANGE UP (ref 30–115)
ALT FLD-CCNC: 15 U/L — SIGNIFICANT CHANGE UP (ref 0–41)
ANION GAP SERPL CALC-SCNC: 12 MMOL/L — SIGNIFICANT CHANGE UP (ref 7–14)
AST SERPL-CCNC: 18 U/L — SIGNIFICANT CHANGE UP (ref 0–41)
BASOPHILS # BLD AUTO: 0.05 K/UL — SIGNIFICANT CHANGE UP (ref 0–0.2)
BASOPHILS NFR BLD AUTO: 1.2 % — SIGNIFICANT CHANGE UP (ref 0–2)
BILIRUB SERPL-MCNC: 0.5 MG/DL — SIGNIFICANT CHANGE UP (ref 0.2–1.2)
BUN SERPL-MCNC: 13 MG/DL — SIGNIFICANT CHANGE UP (ref 10–20)
CALCIUM SERPL-MCNC: 9.5 MG/DL — SIGNIFICANT CHANGE UP (ref 8.4–10.5)
CHLORIDE SERPL-SCNC: 106 MMOL/L — SIGNIFICANT CHANGE UP (ref 98–110)
CO2 SERPL-SCNC: 23 MMOL/L — SIGNIFICANT CHANGE UP (ref 17–32)
CREAT SERPL-MCNC: 0.7 MG/DL — SIGNIFICANT CHANGE UP (ref 0.7–1.5)
EGFR: 98 ML/MIN/1.73M2 — SIGNIFICANT CHANGE UP
EGFR: 98 ML/MIN/1.73M2 — SIGNIFICANT CHANGE UP
EOSINOPHIL # BLD AUTO: 0.18 K/UL — SIGNIFICANT CHANGE UP (ref 0–0.5)
EOSINOPHIL NFR BLD AUTO: 4.1 % — SIGNIFICANT CHANGE UP (ref 0–6)
GLUCOSE SERPL-MCNC: 136 MG/DL — HIGH (ref 70–99)
HCT VFR BLD CALC: 37.7 % — LOW (ref 39–50)
HGB BLD-MCNC: 13 G/DL — SIGNIFICANT CHANGE UP (ref 13–17)
IMM GRANULOCYTES # BLD AUTO: 0.04 K/UL — SIGNIFICANT CHANGE UP (ref 0–0.07)
IMM GRANULOCYTES NFR BLD AUTO: 0.9 % — SIGNIFICANT CHANGE UP (ref 0–0.9)
LYMPHOCYTES # BLD AUTO: 0.81 K/UL — LOW (ref 1–3.3)
LYMPHOCYTES NFR BLD AUTO: 18.7 % — SIGNIFICANT CHANGE UP (ref 13–44)
MAGNESIUM SERPL-MCNC: 2 MG/DL — SIGNIFICANT CHANGE UP (ref 1.8–2.4)
MCHC RBC-ENTMCNC: 31.4 PG — SIGNIFICANT CHANGE UP (ref 27–34)
MCHC RBC-ENTMCNC: 34.5 G/DL — SIGNIFICANT CHANGE UP (ref 32–36)
MCV RBC AUTO: 91.1 FL — SIGNIFICANT CHANGE UP (ref 80–100)
MONOCYTES # BLD AUTO: 0.65 K/UL — SIGNIFICANT CHANGE UP (ref 0–0.9)
MONOCYTES NFR BLD AUTO: 15 % — HIGH (ref 2–14)
NEUTROPHILS # BLD AUTO: 2.61 K/UL — SIGNIFICANT CHANGE UP (ref 1.8–7.4)
NEUTROPHILS NFR BLD AUTO: 60.1 % — SIGNIFICANT CHANGE UP (ref 43–77)
NRBC # BLD AUTO: 0 K/UL — SIGNIFICANT CHANGE UP (ref 0–0)
NRBC # FLD: 0 K/UL — SIGNIFICANT CHANGE UP (ref 0–0)
NRBC BLD AUTO-RTO: 0 /100 WBCS — SIGNIFICANT CHANGE UP (ref 0–0)
PLATELET # BLD AUTO: 230 K/UL — SIGNIFICANT CHANGE UP (ref 150–400)
PMV BLD: 9.3 FL — SIGNIFICANT CHANGE UP (ref 7–13)
POTASSIUM SERPL-MCNC: 3.9 MMOL/L — SIGNIFICANT CHANGE UP (ref 3.5–5)
POTASSIUM SERPL-SCNC: 3.9 MMOL/L — SIGNIFICANT CHANGE UP (ref 3.5–5)
PROT SERPL-MCNC: 6.1 G/DL — SIGNIFICANT CHANGE UP (ref 6–8)
RBC # BLD: 4.14 M/UL — LOW (ref 4.2–5.8)
RBC # FLD: 13.7 % — SIGNIFICANT CHANGE UP (ref 10.3–14.5)
SODIUM SERPL-SCNC: 141 MMOL/L — SIGNIFICANT CHANGE UP (ref 135–146)
WBC # BLD: 4.34 K/UL — SIGNIFICANT CHANGE UP (ref 3.8–10.5)
WBC # FLD AUTO: 4.34 K/UL — SIGNIFICANT CHANGE UP (ref 3.8–10.5)

## 2025-06-18 PROCEDURE — 99239 HOSP IP/OBS DSCHRG MGMT >30: CPT

## 2025-06-18 PROCEDURE — 99232 SBSQ HOSP IP/OBS MODERATE 35: CPT

## 2025-06-18 PROCEDURE — 99222 1ST HOSP IP/OBS MODERATE 55: CPT

## 2025-06-18 RX ORDER — ASPIRIN 325 MG
81 TABLET ORAL DAILY
Refills: 0 | Status: DISCONTINUED | OUTPATIENT
Start: 2025-06-18 | End: 2025-06-18

## 2025-06-18 RX ORDER — CLOPIDOGREL BISULFATE 75 MG/1
75 TABLET, FILM COATED ORAL DAILY
Refills: 0 | Status: DISCONTINUED | OUTPATIENT
Start: 2025-06-18 | End: 2025-06-18

## 2025-06-18 RX ORDER — CLOPIDOGREL BISULFATE 75 MG/1
1 TABLET, FILM COATED ORAL
Qty: 21 | Refills: 0
Start: 2025-06-18 | End: 2025-07-08

## 2025-06-18 RX ORDER — ASPIRIN 325 MG
1 TABLET ORAL
Qty: 30 | Refills: 0
Start: 2025-06-18 | End: 2025-07-17

## 2025-06-18 RX ADMIN — Medication 1 APPLICATION(S): at 06:29

## 2025-06-18 RX ADMIN — CLOPIDOGREL BISULFATE 75 MILLIGRAM(S): 75 TABLET, FILM COATED ORAL at 10:14

## 2025-06-18 RX ADMIN — Medication 75 MILLILITER(S): at 06:29

## 2025-06-18 RX ADMIN — Medication 81 MILLIGRAM(S): at 10:15

## 2025-06-18 NOTE — DISCHARGE NOTE PROVIDER - NSDCMRMEDTOKEN_GEN_ALL_CORE_FT
acetaminophen 325 mg oral tablet: 2 tab(s) orally every 6 hours MDD: 8  aspirin 81 mg oral delayed release tablet: 1 tab(s) orally 2 times a day MDD: 2  atenolol 25 mg oral tablet: 1 tab(s) orally once a day  atorvastatin 80 mg oral tablet: 1 tab(s) orally once a day   Caltrate 600+D oral tablet, chewable: 1 tab(s) chewed once a day  cholecalciferol 125 mcg (5000 intl units) oral tablet: 1 tab(s) orally once a day  clopidogrel 75 mg oral tablet: 1 tab(s) orally once a day  glimepiride 2 mg oral tablet: 1 tab(s) orally once a day  lisinopril 10 mg oral tablet: 1 tab(s) orally once a day  meloxicam 15 mg oral tablet: 1 tab(s) orally once a day  metFORMIN 500 mg oral tablet: 1 tab(s) orally 2 in the am, 1 in the pm  Nubeqa 300 mg oral tablet: 2 tab(s) orally 2 times a day  Orgovyx 120 mg oral tablet: 1 tab(s) orally once a day  pantoprazole 40 mg oral delayed release tablet: 1 tab(s) orally once a day (before a meal) MDD: 1  senna leaf extract oral tablet: 2 tab(s) orally once a day (at bedtime) as needed for  constipation MDD: 2  tamsulosin 0.4 mg oral capsule: 1 cap(s) orally once a day  traMADol 50 mg oral tablet: 1 tab(s) orally every 6 hours as needed for Severe Pain (7 - 10) MDD: 4  Trulicity Pen 0.75 mg/0.5 mL subcutaneous solution: 0.75 milligram(s) subcutaneously once a week on mondays   acetaminophen 325 mg oral tablet: 2 tab(s) orally every 6 hours MDD: 8  aspirin 81 mg oral delayed release tablet: 1 tab(s) orally 2 times a day MDD: 2  atenolol 25 mg oral tablet: 1 tab(s) orally once a day  atorvastatin 80 mg oral tablet: 1 tab(s) orally once a day   Caltrate 600+D oral tablet, chewable: 1 tab(s) chewed once a day  cholecalciferol 125 mcg (5000 intl units) oral tablet: 1 tab(s) orally once a day  clopidogrel 75 mg oral tablet: 1 tab(s) orally once a day  glimepiride 2 mg oral tablet: 1 tab(s) orally once a day  lisinopril 10 mg oral tablet: 1 tab(s) orally once a day  meloxicam 15 mg oral tablet: 1 tab(s) orally once a day  metFORMIN 500 mg oral tablet: 1 tab(s) orally 2 in the am, 1 in the pm  Nubeqa 300 mg oral tablet: 2 tab(s) orally 2 times a day  Orgovyx 120 mg oral tablet: 1 tab(s) orally once a day  tamsulosin 0.4 mg oral capsule: 1 cap(s) orally once a day  Trulicity Pen 0.75 mg/0.5 mL subcutaneous solution: 0.75 milligram(s) subcutaneously once a week on mondays   acetaminophen 325 mg oral tablet: 2 tab(s) orally every 6 hours MDD: 8  aspirin 81 mg oral delayed release tablet: 1 tab(s) orally 2 times a day MDD: 2  atenolol 25 mg oral tablet: 1 tab(s) orally once a day  atorvastatin 80 mg oral tablet: 1 tab(s) orally once a day   Caltrate 600+D oral tablet, chewable: 1 tab(s) chewed once a day  cholecalciferol 125 mcg (5000 intl units) oral tablet: 1 tab(s) orally once a day  clopidogrel 75 mg oral tablet: 1 tab(s) orally once a day  glimepiride 2 mg oral tablet: 1 tab(s) orally once a day  lisinopril 10 mg oral tablet: 1 tab(s) orally once a day  metFORMIN 500 mg oral tablet: 1 tab(s) orally 2 in the am, 1 in the pm  Nubeqa 300 mg oral tablet: 2 tab(s) orally 2 times a day  Orgovyx 120 mg oral tablet: 1 tab(s) orally once a day  tamsulosin 0.4 mg oral capsule: 1 cap(s) orally once a day  Trulicity Pen 0.75 mg/0.5 mL subcutaneous solution: 0.75 milligram(s) subcutaneously once a week on mondays   acetaminophen 325 mg oral tablet: 2 tab(s) orally every 6 hours MDD: 8  aspirin 81 mg oral delayed release tablet: 1 tab(s) orally once a day MDD: 1  atenolol 25 mg oral tablet: 1 tab(s) orally once a day  atorvastatin 80 mg oral tablet: 1 tab(s) orally once a day   Caltrate 600+D oral tablet, chewable: 1 tab(s) chewed once a day  cholecalciferol 125 mcg (5000 intl units) oral tablet: 1 tab(s) orally once a day  clopidogrel 75 mg oral tablet: 1 tab(s) orally once a day  glimepiride 2 mg oral tablet: 1 tab(s) orally once a day  lisinopril 10 mg oral tablet: 1 tab(s) orally once a day  metFORMIN 500 mg oral tablet: 1 tab(s) orally 2 in the am, 1 in the pm  Nubeqa 300 mg oral tablet: 2 tab(s) orally 2 times a day  Orgovyx 120 mg oral tablet: 1 tab(s) orally once a day  tamsulosin 0.4 mg oral capsule: 1 cap(s) orally once a day  Trulicity Pen 0.75 mg/0.5 mL subcutaneous solution: 0.75 milligram(s) subcutaneously once a week on mondays

## 2025-06-18 NOTE — DISCHARGE NOTE PROVIDER - ATTENDING DISCHARGE PHYSICAL EXAMINATION:
PHYSICAL EXAM:  Vital Signs Last 24 Hrs  T(C): 36.2 (18 Jun 2025 11:00), Max: 37 (17 Jun 2025 23:00)  T(F): 97.2 (18 Jun 2025 11:00), Max: 98.6 (17 Jun 2025 23:00)  HR: 71 (18 Jun 2025 13:06) (53 - 74)  BP: 146/79 (18 Jun 2025 13:06) (117/69 - 164/102)  BP(mean): 106 (18 Jun 2025 13:06) (83 - 112)  RR: 18  SpO2: 98% (18 Jun 2025 13:06) (92% - 98%)      GENERAL: NAD, well-groomed, well-developed  HEAD:  Atraumatic, Normocephalic  EYES: EOMI, PERRLA, conjunctiva and sclera clear  ENMT: No tonsillar erythema, exudates, or enlargement; Moist mucous membranes, Good dentition, No lesions  NECK: Supple, No JVD, Normal thyroid  NERVOUS SYSTEM:  Alert & Oriented X3, Good concentration; Motor Strength 5/5 B/L upper and lower extremities; DTRs 2+ intact and symmetric  CHEST/LUNG: Clear to percussion bilaterally; No rales, rhonchi, wheezing, or rubs  HEART: Regular rate and rhythm; No murmurs, rubs, or gallops  ABDOMEN: Soft, Nontender, Nondistended; Bowel sounds present  EXTREMITIES:  2+ Peripheral Pulses, No clubbing, cyanosis, or edema  LYMPH: No lymphadenopathy noted  SKIN: No rashes or lesions

## 2025-06-18 NOTE — DISCHARGE NOTE PROVIDER - NSDCFUADDAPPT_GEN_ALL_CORE_FT
Please follow up with Neurology within 2 weeks after discharge.  Please follow up with your primary care physician within 2 weeks after discharge.

## 2025-06-18 NOTE — PROGRESS NOTE ADULT - SUBJECTIVE AND OBJECTIVE BOX
NEUROLOGY PROGRESS NOTE     Interval HPI: Patient seen and examined. No overnight events. He reports he is back to baseline, and is eager to go home.     HPI: 75yo RHM PMH prostate cancer with mets to bones, obesity, recent L hip replacement, OA, HTN, DMII, SCC of tongue, AS presented to ED for aphasia. He notes he was sitting at home when all of a sudden he felt as if he could not get his words out. He felt slightly confused as well. He was brought to ED, symptoms persisted. Stroke team evaluated patient and he was given TNK. This morning he feels basically back to baseline, maybe still slightly confused.     He is on ASA 81mg qd at home, and sees PCP regularly. He is compliant with Rx.        MEDICATIONS  Home Medications:  atenolol 25 mg oral tablet: 1 tab(s) orally once a day (16 May 2025 08:46)  Caltrate 600+D oral tablet, chewable: 1 tab(s) chewed once a day (16 May 2025 08:46)  cholecalciferol 125 mcg (5000 intl units) oral tablet: 1 tab(s) orally once a day (16 May 2025 08:46)  glimepiride 2 mg oral tablet: 1 tab(s) orally once a day (16 May 2025 08:46)  lisinopril 10 mg oral tablet: 1 tab(s) orally once a day (16 May 2025 08:46)  meloxicam 15 mg oral tablet: 1 tab(s) orally once a day (16 May 2025 08:46)  metFORMIN 500 mg oral tablet: 1 tab(s) orally 2 in the am, 1 in the pm (16 May 2025 08:46)  Nubeqa 300 mg oral tablet: 2 tab(s) orally 2 times a day (16 May 2025 08:46)  Orgovyx 120 mg oral tablet: 1 tab(s) orally once a day (16 May 2025 08:46)  tamsulosin 0.4 mg oral capsule: 1 cap(s) orally once a day (16 May 2025 08:46)  Trulicity Pen 0.75 mg/0.5 mL subcutaneous solution: 0.75 milligram(s) subcutaneously once a week on mondays (16 May 2025 08:46)    MEDICATIONS  (STANDING):  atorvastatin 80 milliGRAM(s) Oral at bedtime  chlorhexidine 2% Cloths 1 Application(s) Topical <User Schedule>  niCARdipine Infusion 5 mG/Hr (25 mL/Hr) IV Continuous <Continuous>  sodium chloride 0.9% lock flush 10 milliLiter(s) IV Push once  sodium chloride 0.9% lock flush 10 milliLiter(s) IV Push once  sodium chloride 0.9%. 1000 milliLiter(s) (75 mL/Hr) IV Continuous <Continuous>    MEDICATIONS  (PRN):  acetaminophen     Tablet .. 650 milliGRAM(s) Oral every 6 hours PRN Mild Pain (1 - 3)      FAMILY HISTORY:  FH: lung cancer (Sibling)      SOCIAL HISTORY: negative for tobacco, alcohol, or illicit drug use.    Allergies    No Known Allergies        GEN: NAD, pleasant, cooperative    NEURO:   MENTAL STATUS: AAOx3  LANG/SPEECH: Fluent, intact naming, repetition & comprehension  CRANIAL NERVES:  II: Normal visual fields   III, IV, VI: EOM intact, no gaze preference or deviation  V: normal  VII: no facial asymmetry  VIII: normal hearing to speech  MOTOR: 5/5 in both upper and lower extremities  REFLEXES: downgoing toes   SENSORY: Normal to light touch. No neglect   COORD: Normal finger to nose and heel to shin, no tremor, no dysmetria    NIHSS: 0    LABS:                        13.0   4.34  )-----------( 230      ( 18 Jun 2025 05:49 )             37.7     06-16    136  |  101  |  18  ----------------------------<  192[H]  6.2[HH]   |  21  |  0.9    Ca    9.3      16 Jun 2025 15:55  Mg     2.0     06-18    TPro  6.8  /  Alb  4.2  /  TBili  0.3  /  DBili  x   /  AST  65[H]  /  ALT  22  /  AlkPhos  80  06-16    Hemoglobin A1C:   Vitamin B12   PT/INR - ( 16 Jun 2025 15:55 )   PT: 10.70 sec;   INR: 0.91 ratio         PTT - ( 16 Jun 2025 15:55 )  PTT:33.8 sec  CAPILLARY BLOOD GLUCOSE  214 (16 Jun 2025 21:52)      POCT Blood Glucose.: 196 mg/dL (16 Jun 2025 16:11)      Urinalysis Basic - ( 16 Jun 2025 15:55 )    Color: x / Appearance: x / SG: x / pH: x  Gluc: 192 mg/dL / Ketone: x  / Bili: x / Urobili: x   Blood: x / Protein: x / Nitrite: x   Leuk Esterase: x / RBC: x / WBC x   Sq Epi: x / Non Sq Epi: x / Bacteria: x          RADIOLOGY           NEUROLOGY PROGRESS NOTE     Interval HPI: Patient seen and examined. No overnight events. He reports he is back to baseline, and is eager to go home.     HPI: 75yo RHM PMH prostate cancer with mets to bones, obesity, recent L hip replacement, OA, HTN, DMII, SCC of tongue, AS presented to ED for aphasia. He notes he was sitting at home when all of a sudden he felt as if he could not get his words out. He felt slightly confused as well. He was brought to ED, symptoms persisted. Stroke team evaluated patient and he was given TNK. This morning he feels basically back to baseline, maybe still slightly confused.     He is on ASA 81mg qd at home, and sees PCP regularly. He is compliant with Rx.        MEDICATIONS  Home Medications:  atenolol 25 mg oral tablet: 1 tab(s) orally once a day (16 May 2025 08:46)  Caltrate 600+D oral tablet, chewable: 1 tab(s) chewed once a day (16 May 2025 08:46)  cholecalciferol 125 mcg (5000 intl units) oral tablet: 1 tab(s) orally once a day (16 May 2025 08:46)  glimepiride 2 mg oral tablet: 1 tab(s) orally once a day (16 May 2025 08:46)  lisinopril 10 mg oral tablet: 1 tab(s) orally once a day (16 May 2025 08:46)  meloxicam 15 mg oral tablet: 1 tab(s) orally once a day (16 May 2025 08:46)  metFORMIN 500 mg oral tablet: 1 tab(s) orally 2 in the am, 1 in the pm (16 May 2025 08:46)  Nubeqa 300 mg oral tablet: 2 tab(s) orally 2 times a day (16 May 2025 08:46)  Orgovyx 120 mg oral tablet: 1 tab(s) orally once a day (16 May 2025 08:46)  tamsulosin 0.4 mg oral capsule: 1 cap(s) orally once a day (16 May 2025 08:46)  Trulicity Pen 0.75 mg/0.5 mL subcutaneous solution: 0.75 milligram(s) subcutaneously once a week on mondays (16 May 2025 08:46)    MEDICATIONS  (STANDING):  atorvastatin 80 milliGRAM(s) Oral at bedtime  chlorhexidine 2% Cloths 1 Application(s) Topical <User Schedule>  niCARdipine Infusion 5 mG/Hr (25 mL/Hr) IV Continuous <Continuous>  sodium chloride 0.9% lock flush 10 milliLiter(s) IV Push once  sodium chloride 0.9% lock flush 10 milliLiter(s) IV Push once  sodium chloride 0.9%. 1000 milliLiter(s) (75 mL/Hr) IV Continuous <Continuous>    MEDICATIONS  (PRN):  acetaminophen     Tablet .. 650 milliGRAM(s) Oral every 6 hours PRN Mild Pain (1 - 3)      FAMILY HISTORY:  FH: lung cancer (Sibling)      SOCIAL HISTORY: negative for tobacco, alcohol, or illicit drug use.    Allergies    No Known Allergies        GEN: NAD, pleasant, cooperative    NEURO:   MENTAL STATUS: AAOx3  LANG/SPEECH: Fluent, intact naming, repetition & comprehension  CRANIAL NERVES:  II: Normal visual fields   III, IV, VI: EOM intact, no gaze preference or deviation  V: normal  VII: no facial asymmetry  VIII: normal hearing to speech  MOTOR: 5/5 in both upper and lower extremities  REFLEXES: downgoing toes   SENSORY: Normal to light touch. No neglect   COORD: Normal finger to nose and heel to shin, no tremor, no dysmetria    NIHSS: 0    LABS:                        13.0   4.34  )-----------( 230      ( 18 Jun 2025 05:49 )             37.7     06-16    136  |  101  |  18  ----------------------------<  192[H]  6.2[HH]   |  21  |  0.9    Ca    9.3      16 Jun 2025 15:55  Mg     2.0     06-18    TPro  6.8  /  Alb  4.2  /  TBili  0.3  /  DBili  x   /  AST  65[H]  /  ALT  22  /  AlkPhos  80  06-16    Hemoglobin A1C:   Vitamin B12   PT/INR - ( 16 Jun 2025 15:55 )   PT: 10.70 sec;   INR: 0.91 ratio         PTT - ( 16 Jun 2025 15:55 )  PTT:33.8 sec  CAPILLARY BLOOD GLUCOSE  214 (16 Jun 2025 21:52)      POCT Blood Glucose.: 196 mg/dL (16 Jun 2025 16:11)      Urinalysis Basic - ( 16 Jun 2025 15:55 )    Color: x / Appearance: x / SG: x / pH: x  Gluc: 192 mg/dL / Ketone: x  / Bili: x / Urobili: x   Blood: x / Protein: x / Nitrite: x   Leuk Esterase: x / RBC: x / WBC x   Sq Epi: x / Non Sq Epi: x / Bacteria: x          RADIOLOGY  < from: CT Head No Cont (06.17.25 @ 17:05) >  IMPRESSION:    1.  No evidence of acute intracranial hemorrhage or large territory   infarct.    2.  Stable tiny chronic infarct in the left cerebellar hemisphere.    --- End of Report ---    < end of copied text >    < from: MR Head No Cont (06.17.25 @ 16:51) >  IMPRESSION:    1.  No acute infarct or intracranial hemorrhage.    2.  Small chronic infarct within the left cerebellar hemisphere, new   since 3/8/2023.    3.  Minimal chronic microvascular changes.    --- End of Report ---    < end of copied text >           NEUROLOGY PROGRESS NOTE     Interval HPI: Patient seen and examined. No overnight events. He reports he is back to baseline, and is eager to go home.     HPI: 75yo RHM PMH prostate cancer with mets to bones, obesity, recent L hip replacement, OA, HTN, DMII, SCC of tongue, AS presented to ED for aphasia. He notes he was sitting at home when all of a sudden he felt as if he could not get his words out. He felt slightly confused as well. He was brought to ED, symptoms persisted. Stroke team evaluated patient and he was given TNK. This morning he feels basically back to baseline, maybe still slightly confused.     He is on ASA 81mg qd at home, and sees PCP regularly. He is compliant with Rx.      MEDICATIONS  Home Medications:  atenolol 25 mg oral tablet: 1 tab(s) orally once a day (16 May 2025 08:46)  Caltrate 600+D oral tablet, chewable: 1 tab(s) chewed once a day (16 May 2025 08:46)  cholecalciferol 125 mcg (5000 intl units) oral tablet: 1 tab(s) orally once a day (16 May 2025 08:46)  glimepiride 2 mg oral tablet: 1 tab(s) orally once a day (16 May 2025 08:46)  lisinopril 10 mg oral tablet: 1 tab(s) orally once a day (16 May 2025 08:46)  meloxicam 15 mg oral tablet: 1 tab(s) orally once a day (16 May 2025 08:46)  metFORMIN 500 mg oral tablet: 1 tab(s) orally 2 in the am, 1 in the pm (16 May 2025 08:46)  Nubeqa 300 mg oral tablet: 2 tab(s) orally 2 times a day (16 May 2025 08:46)  Orgovyx 120 mg oral tablet: 1 tab(s) orally once a day (16 May 2025 08:46)  tamsulosin 0.4 mg oral capsule: 1 cap(s) orally once a day (16 May 2025 08:46)  Trulicity Pen 0.75 mg/0.5 mL subcutaneous solution: 0.75 milligram(s) subcutaneously once a week on mondays (16 May 2025 08:46)    MEDICATIONS  (STANDING):  atorvastatin 80 milliGRAM(s) Oral at bedtime  chlorhexidine 2% Cloths 1 Application(s) Topical <User Schedule>  niCARdipine Infusion 5 mG/Hr (25 mL/Hr) IV Continuous <Continuous>  sodium chloride 0.9% lock flush 10 milliLiter(s) IV Push once  sodium chloride 0.9% lock flush 10 milliLiter(s) IV Push once  sodium chloride 0.9%. 1000 milliLiter(s) (75 mL/Hr) IV Continuous <Continuous>    MEDICATIONS  (PRN):  acetaminophen     Tablet .. 650 milliGRAM(s) Oral every 6 hours PRN Mild Pain (1 - 3)      FAMILY HISTORY:  FH: lung cancer (Sibling)      SOCIAL HISTORY: negative for tobacco, alcohol, or illicit drug use.    Allergies    No Known Allergies        GEN: NAD, pleasant, cooperative    NEURO:   MENTAL STATUS: AAOx3  LANG/SPEECH: Fluent, intact naming, repetition & comprehension  CRANIAL NERVES:  II: Normal visual fields   III, IV, VI: EOM intact, no gaze preference or deviation  V: normal  VII: no facial asymmetry  VIII: normal hearing to speech  MOTOR: 5/5 in both upper and lower extremities  REFLEXES: downgoing toes   SENSORY: Normal to light touch. No neglect   COORD: Normal finger to nose and heel to shin, no tremor, no dysmetria    NIHSS: 0    LABS:                        13.0   4.34  )-----------( 230      ( 18 Jun 2025 05:49 )             37.7     06-16    136  |  101  |  18  ----------------------------<  192[H]  6.2[HH]   |  21  |  0.9    Ca    9.3      16 Jun 2025 15:55  Mg     2.0     06-18    TPro  6.8  /  Alb  4.2  /  TBili  0.3  /  DBili  x   /  AST  65[H]  /  ALT  22  /  AlkPhos  80  06-16    Hemoglobin A1C:   Vitamin B12   PT/INR - ( 16 Jun 2025 15:55 )   PT: 10.70 sec;   INR: 0.91 ratio         PTT - ( 16 Jun 2025 15:55 )  PTT:33.8 sec  CAPILLARY BLOOD GLUCOSE  214 (16 Jun 2025 21:52)      POCT Blood Glucose.: 196 mg/dL (16 Jun 2025 16:11)      Urinalysis Basic - ( 16 Jun 2025 15:55 )    Color: x / Appearance: x / SG: x / pH: x  Gluc: 192 mg/dL / Ketone: x  / Bili: x / Urobili: x   Blood: x / Protein: x / Nitrite: x   Leuk Esterase: x / RBC: x / WBC x   Sq Epi: x / Non Sq Epi: x / Bacteria: x          RADIOLOGY  < from: CT Head No Cont (06.17.25 @ 17:05) >  IMPRESSION:    1.  No evidence of acute intracranial hemorrhage or large territory   infarct.    2.  Stable tiny chronic infarct in the left cerebellar hemisphere.    --- End of Report ---    < end of copied text >    < from: MR Head No Cont (06.17.25 @ 16:51) >  IMPRESSION:    1.  No acute infarct or intracranial hemorrhage.    2.  Small chronic infarct within the left cerebellar hemisphere, new   since 3/8/2023.    3.  Minimal chronic microvascular changes.    --- End of Report ---    < end of copied text >

## 2025-06-18 NOTE — DISCHARGE NOTE PROVIDER - NSDCCPCAREPLAN_GEN_ALL_CORE_FT
PRINCIPAL DISCHARGE DIAGNOSIS  Diagnosis: Stroke  Assessment and Plan of Treatment: You were admitted after developing sudden difficulty speaking (aphasia), and were diagnosed with an acute stroke (CVA). In the emergency department, you received a clot-busting medication (TNK) at 5:13pm yesterday, which resulted in significant improvement in your symptoms. Follow-up imaging showed no evidence of bleeding or large new stroke, with only a small, stable, old stroke in your left cerebellum. Your heart function remains good. During your stay, you worked with physical, occupational, and speech therapy, and were evaluated by neurology. You will start dual antiplatelet therapy for 21 days, then continue low-dose aspirin, and you should keep taking your cholesterol medication (atorvastatin). Please follow up with a neurologist as an outpatient, and contact your healthcare provider if you notice any new or worsening symptoms.

## 2025-06-18 NOTE — DISCHARGE NOTE NURSING/CASE MANAGEMENT/SOCIAL WORK - PATIENT PORTAL LINK FT
You can access the FollowMyHealth Patient Portal offered by [facility] by registering at the following website: [email or web address]. By joining [employer]’s FollowMyHealth portal, you will also be able to view your health information using other applications (apps) compatible with our system.

## 2025-06-18 NOTE — PROGRESS NOTE ADULT - ASSESSMENT
75yo RHM PMH prostate cancer with mets to bones, obesity, recent L hip replacement, OA, HTN, DMII, SCC of tongue, AS presented to ED for aphasia s/p TNK on 6/16/25. NIHSS today 0, no focal neuro deficits and patient feels back to baseline. Awaiting repeat CTH and MRI Brain results.  Endo referral: deferred 14; Phone & portal message with information to schedule appointment.    75yo RHM PMH prostate cancer with mets to bones, obesity, recent L hip replacement, OA, HTN, DMII, SCC of tongue, AS presented to ED for aphasia, s/p TNK on 6/16/25. NIHSS today 0, no focal neuro deficits and patient feels back to baseline. Repeat CTH reports no bleed, and MRI Brain reports no stroke. Likely etiology of episode TIA.  75yo RHM PMH prostate cancer with mets to bones, obesity, recent L hip replacement, OA, HTN, DMII, SCC of tongue, AS presented to ED for aphasia, s/p TNK on 6/16/25. NIHSS today 0, no focal neuro deficits and patient feels back to baseline. Repeat CTH reports no bleed, and MRI Brain reports no stroke. Likely cause of episode neurovascular, etiology small vessel disease.     Recommendations  - DAPT x21 days then ASA 81mg monotherapy  - Continue atorvastatin 80mg qd  - Can d/c neuro checks  - Normotension   - F/u outpatient stroke clinic 2 weeks  - Medical management per primary team    Discussed with attending Dr Sapp

## 2025-06-18 NOTE — PROGRESS NOTE ADULT - ASSESSMENT
75 yo male with a medical history of OA, HTN, DM, scc of tongue, AS, prostate cancer w mets to bone, and obesity, recent L hip replacement.  Pt presented w acute aphasia, evaluated in the ed and tele-stroke, pt was administered TNK at 5:13pm yesterday    acute CVA s/p TNK     - significant improvement of symptoms   - check repeat CT head at ~5pm today (if negative for bleed start aspirin and sq Lovenox for DVT prophylaxis)   - post TNK protocol   - MRI brain   - check official TTE result   - PT/OT/SLP   - neurology consult   - Tylenol prn for pain

## 2025-06-18 NOTE — PROGRESS NOTE ADULT - TIME BILLING
175.26
above.  Total time spent includes but is not limited to personal review of patient chart, available results, collateral information (if necessary) and examination of patient at bedside and time spent formulating assessment and recommendations independent of teaching time.

## 2025-06-18 NOTE — DISCHARGE NOTE PROVIDER - PROVIDER TOKENS
PROVIDER:[TOKEN:[[id number]],FOLLOWUP:[2 weeks]],PROVIDER:[TOKEN:[[id number]],FOLLOWUP:[2 weeks]] Consent (Temporal Branch)/Introductory Paragraph: The rationale for Mohs was explained to the patient and consent was obtained. The risks, benefits and alternatives to therapy were discussed in detail. Specifically, the risks of damage to the temporal branch of the facial nerve, infection, scarring, bleeding, prolonged wound healing, incomplete removal, allergy to anesthesia, and recurrence were addressed. Prior to the procedure, the treatment site was clearly identified and confirmed by the patient. All components of Universal Protocol/PAUSE Rule completed.

## 2025-06-18 NOTE — CHART NOTE - NSCHARTNOTEFT_GEN_A_CORE
MRI completed negative for acute infarct. 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 ST services not warranted at this time. Pt. on regular with thin liquids. Oropharyngeal swallow WNL. SLP d/c reconsult PRN.

## 2025-06-18 NOTE — DISCHARGE NOTE NURSING/CASE MANAGEMENT/SOCIAL WORK - FINANCIAL ASSISTANCE
[facility] provides services at a reduced cost to those who are determined to be eligible through [facility]’s financial assistance program. Information regarding [facility]’s financial assistance program can be found by going to [email or web address] or by calling [phone].

## 2025-06-18 NOTE — DISCHARGE NOTE PROVIDER - NSDCFUSCHEDAPPT_GEN_ALL_CORE_FT
Rolando Wheeler  [facility] Physician [facility]  ENDOCRIN [address]  Scheduled Appointment: 07/12/2025    Hip-Chris Pandey  [facility]  ONCORTHO [address]  Scheduled Appointment: 07/31/2025

## 2025-06-18 NOTE — DISCHARGE NOTE PROVIDER - CARE PROVIDER_API CALL
Jayden Sapp  Neurology  [address] [place] [address], [address]  [place] [address]  Phone: [phone]  Fax: [phone]  Follow Up Time: 2 weeks    Bridgette Garrett  Internal Medicine  [address] [place] [id number]  Phone: [phone]  Fax: [phone]  Follow Up Time: 2 weeks

## 2025-06-18 NOTE — DISCHARGE NOTE PROVIDER - HOSPITAL COURSE
75 yo male with a medical history of OA, HTN, DM, scc of tongue, AS, prostate cancer w mets to bone, and obesity, recent L hip replacement.  Pt presented w acute aphasia, evaluated in the ed and tele-stroke, pt was administered TNK at 5:13pm yesterday    acute CVA s/p TNK     - significant improvement of symptoms   - Repeat CT head 24 hour post TNK: No evidence of acute intracranial hemorrhage or large territory infarct. Stable tiny chronic infarct in the left cerebellar hemisphere.   - MRI brain: Small chronic infarct within the left cerebellar hemisphere, new since 3/8/2023   - TTE: EF: 59%    - PT/OT/SLP   - neurology consult: start DAPT x21 days then ASA 81mg qd, Continue atorvastatin 80mg qd   - F/u outpatient with neurology

## 2025-06-18 NOTE — PROGRESS NOTE ADULT - SUBJECTIVE AND OBJECTIVE BOX
VERN CHAVARRIA 74y Male  MRN#: [id number]   Hospital Day: 2d    HPI:  Pt is a 73 yo male PMHx sig for OA, HTN, DM ii, scc of tongue, AS, prostate cancer w mets to bone, and obesity, recent L hip replacement.  Pt presented w acute aphasia, evaluated in the ed and telestroke, pt was administered TNK. (16 Jun 2025 21:40)      SUBJECTIVE  Patient is a 74y old Male who presents with a chief complaint of aphasia (17 Jun 2025 15:22)  Currently admitted to medicine with the primary diagnosis of Stroke      INTERVAL HPI AND OVERNIGHT EVENTS:  Patient was examined and seen at bedside. This morning he is resting comfortably in bed and reports no issues or overnight events.      VITAL SIGNS: Last 24 Hours  T(C): 37 (17 Jun 2025 23:00), Max: 37 (17 Jun 2025 23:00)  T(F): 98.6 (17 Jun 2025 23:00), Max: 98.6 (17 Jun 2025 23:00)  HR: 54 (18 Jun 2025 04:00) (52 - 68)  BP: 133/65 (18 Jun 2025 04:00) (117/69 - 158/71)  BP(mean): 93 (18 Jun 2025 04:00) (83 - 106)  RR: 19 (18 Jun 2025 04:00) (8 - 21)  SpO2: 94% (18 Jun 2025 04:00) (92% - 98%)    LABS:                        13.0   4.34  )-----------( 230      ( 18 Jun 2025 05:49 )             37.7     06-16    136  |  101  |  18  ----------------------------<  192[H]  6.2[HH]   |  21  |  0.9    Ca    9.3      16 Jun 2025 15:55    TPro  6.8  /  Alb  4.2  /  TBili  0.3  /  DBili  x   /  AST  65[H]  /  ALT  22  /  AlkPhos  80  06-16    PT/INR - ( 16 Jun 2025 15:55 )   PT: 10.70 sec;   INR: 0.91 ratio         PTT - ( 16 Jun 2025 15:55 )  PTT:33.8 sec  Urinalysis Basic - ( 16 Jun 2025 15:55 )    Color: x / Appearance: x / SG: x / pH: x  Gluc: 192 mg/dL / Ketone: x  / Bili: x / Urobili: x   Blood: x / Protein: x / Nitrite: x   Leuk Esterase: x / RBC: x / WBC x   Sq Epi: x / Non Sq Epi: x / Bacteria: x    PHYSICAL EXAM:    GENERAL: NAD  HEAD:  Atraumatic, Normocephalic  EYES: EOMI, PERRLA, conjunctiva and sclera clear  NERVOUS SYSTEM:  Alert & Oriented X3, no focal deficits   CHEST/LUNG: Clear to percussion bilaterally; No rales, rhonchi, wheezing, or rubs  HEART: Regular rate and rhythm; No murmurs, rubs, or gallops  ABDOMEN: Soft, Nontender, Nondistended; Bowel sounds present  EXTREMITIES:  2+ Peripheral Pulses, No clubbing, cyanosis, or edema

## 2025-06-21 ENCOUNTER — NON-APPOINTMENT (OUTPATIENT)
Age: 75
End: 2025-06-21

## 2025-06-24 DIAGNOSIS — Z96.642 PRESENCE OF LEFT ARTIFICIAL HIP JOINT: ICD-10-CM

## 2025-06-24 DIAGNOSIS — Z85.810 PERSONAL HISTORY OF MALIGNANT NEOPLASM OF TONGUE: ICD-10-CM

## 2025-06-24 DIAGNOSIS — Z92.89 PERSONAL HISTORY OF OTHER MEDICAL TREATMENT: ICD-10-CM

## 2025-06-24 DIAGNOSIS — Z87.891 PERSONAL HISTORY OF NICOTINE DEPENDENCE: ICD-10-CM

## 2025-06-24 DIAGNOSIS — M19.90 UNSPECIFIED OSTEOARTHRITIS, UNSPECIFIED SITE: ICD-10-CM

## 2025-06-24 DIAGNOSIS — R47.01 APHASIA: ICD-10-CM

## 2025-06-24 DIAGNOSIS — Z79.82 LONG TERM (CURRENT) USE OF ASPIRIN: ICD-10-CM

## 2025-06-24 DIAGNOSIS — E66.9 OBESITY, UNSPECIFIED: ICD-10-CM

## 2025-06-24 DIAGNOSIS — Z79.1 LONG TERM (CURRENT) USE OF NON-STEROIDAL ANTI-INFLAMMATORIES (NSAID): ICD-10-CM

## 2025-06-24 DIAGNOSIS — R29.704 NIHSS SCORE 4: ICD-10-CM

## 2025-06-24 DIAGNOSIS — E11.9 TYPE 2 DIABETES MELLITUS WITHOUT COMPLICATIONS: ICD-10-CM

## 2025-06-24 DIAGNOSIS — I63.542 CEREBRAL INFARCTION DUE TO UNSPECIFIED OCCLUSION OR STENOSIS OF LEFT CEREBELLAR ARTERY: ICD-10-CM

## 2025-06-24 DIAGNOSIS — Z86.73 PERSONAL HISTORY OF TRANSIENT ISCHEMIC ATTACK (TIA), AND CEREBRAL INFARCTION WITHOUT RESIDUAL DEFICITS: ICD-10-CM

## 2025-06-24 DIAGNOSIS — I10 ESSENTIAL (PRIMARY) HYPERTENSION: ICD-10-CM

## 2025-06-24 DIAGNOSIS — Z79.84 LONG TERM (CURRENT) USE OF ORAL HYPOGLYCEMIC DRUGS: ICD-10-CM

## 2025-06-24 DIAGNOSIS — I35.0 NONRHEUMATIC AORTIC (VALVE) STENOSIS: ICD-10-CM

## 2025-06-24 DIAGNOSIS — Z79.85 LONG-TERM (CURRENT) USE OF INJECTABLE NON-INSULIN ANTIDIABETIC DRUGS: ICD-10-CM

## 2025-06-24 DIAGNOSIS — Z86.19 PERSONAL HISTORY OF OTHER INFECTIOUS AND PARASITIC DISEASES: ICD-10-CM

## 2025-06-24 DIAGNOSIS — Z85.830 PERSONAL HISTORY OF MALIGNANT NEOPLASM OF BONE: ICD-10-CM

## 2025-06-24 DIAGNOSIS — Z85.46 PERSONAL HISTORY OF MALIGNANT NEOPLASM OF PROSTATE: ICD-10-CM

## 2025-06-24 DIAGNOSIS — Z79.899 OTHER LONG TERM (CURRENT) DRUG THERAPY: ICD-10-CM

## 2025-06-25 ENCOUNTER — NON-APPOINTMENT (OUTPATIENT)
Age: 75
End: 2025-06-25

## 2025-07-05 ENCOUNTER — RX RENEWAL (OUTPATIENT)
Age: 75
End: 2025-07-05

## 2025-07-12 ENCOUNTER — APPOINTMENT (OUTPATIENT)
Dept: ENDOCRINOLOGY | Facility: CLINIC | Age: 75
End: 2025-07-12

## 2025-07-25 ENCOUNTER — APPOINTMENT (OUTPATIENT)
Dept: NEUROLOGY | Facility: CLINIC | Age: 75
End: 2025-07-25
Payer: MEDICARE

## 2025-07-25 ENCOUNTER — NON-APPOINTMENT (OUTPATIENT)
Age: 75
End: 2025-07-25

## 2025-07-25 VITALS — WEIGHT: 221.31 LBS | BODY MASS INDEX: 29.33 KG/M2 | HEIGHT: 73 IN

## 2025-07-25 VITALS — SYSTOLIC BLOOD PRESSURE: 125 MMHG | DIASTOLIC BLOOD PRESSURE: 87 MMHG | HEART RATE: 88 BPM | OXYGEN SATURATION: 97 %

## 2025-07-25 DIAGNOSIS — Z80.1 FAMILY HISTORY OF MALIGNANT NEOPLASM OF TRACHEA, BRONCHUS AND LUNG: ICD-10-CM

## 2025-07-25 DIAGNOSIS — I63.9 CEREBRAL INFARCTION, UNSPECIFIED: ICD-10-CM

## 2025-07-25 PROCEDURE — G2211 COMPLEX E/M VISIT ADD ON: CPT

## 2025-07-25 PROCEDURE — 99215 OFFICE O/P EST HI 40 MIN: CPT

## 2025-07-25 RX ORDER — MULTIVIT WITH MIN/ALA/HERBS 50 MG
TABLET ORAL
Refills: 0 | Status: ACTIVE | COMMUNITY

## 2025-07-25 RX ORDER — DAROLUTAMIDE 300 MG/1
300 TABLET, FILM COATED ORAL
Refills: 0 | Status: ACTIVE | COMMUNITY

## 2025-07-25 RX ORDER — RELUGOLIX 120 MG/1
120 TABLET, FILM COATED ORAL
Refills: 0 | Status: ACTIVE | COMMUNITY

## 2025-07-25 RX ORDER — TAMSULOSIN HYDROCHLORIDE 0.4 MG/1
0.4 CAPSULE ORAL
Refills: 0 | Status: ACTIVE | COMMUNITY

## 2025-07-31 ENCOUNTER — APPOINTMENT (OUTPATIENT)
Facility: CLINIC | Age: 75
End: 2025-07-31
Payer: MEDICARE

## 2025-07-31 DIAGNOSIS — Z96.642 PRESENCE OF LEFT ARTIFICIAL HIP JOINT: ICD-10-CM

## 2025-07-31 PROCEDURE — 99024 POSTOP FOLLOW-UP VISIT: CPT

## 2025-08-29 ENCOUNTER — RX RENEWAL (OUTPATIENT)
Age: 75
End: 2025-08-29

## 2025-09-13 ENCOUNTER — NON-APPOINTMENT (OUTPATIENT)
Age: 75
End: 2025-09-13